# Patient Record
Sex: FEMALE | Race: WHITE | NOT HISPANIC OR LATINO | URBAN - METROPOLITAN AREA
[De-identification: names, ages, dates, MRNs, and addresses within clinical notes are randomized per-mention and may not be internally consistent; named-entity substitution may affect disease eponyms.]

---

## 2019-01-14 ENCOUNTER — INPATIENT (INPATIENT)
Facility: HOSPITAL | Age: 44
LOS: 0 days | Discharge: HOME | DRG: 897 | End: 2019-01-15
Attending: STUDENT IN AN ORGANIZED HEALTH CARE EDUCATION/TRAINING PROGRAM | Admitting: STUDENT IN AN ORGANIZED HEALTH CARE EDUCATION/TRAINING PROGRAM
Payer: COMMERCIAL

## 2019-01-14 VITALS
OXYGEN SATURATION: 98 % | WEIGHT: 218.04 LBS | RESPIRATION RATE: 18 BRPM | TEMPERATURE: 99 F | HEART RATE: 122 BPM | DIASTOLIC BLOOD PRESSURE: 83 MMHG | SYSTOLIC BLOOD PRESSURE: 128 MMHG

## 2019-01-14 LAB
ALBUMIN SERPL ELPH-MCNC: 5 G/DL — SIGNIFICANT CHANGE UP (ref 3.3–5)
ALP SERPL-CCNC: 57 U/L — SIGNIFICANT CHANGE UP (ref 40–120)
ALT FLD-CCNC: 123 U/L — HIGH (ref 10–45)
ANION GAP SERPL CALC-SCNC: 20 MMOL/L — HIGH (ref 5–17)
ANION GAP SERPL CALC-SCNC: 26 MMOL/L — HIGH (ref 5–17)
AST SERPL-CCNC: 146 U/L — HIGH (ref 10–40)
BASE EXCESS BLDV CALC-SCNC: 1.1 MMOL/L — SIGNIFICANT CHANGE UP
BASOPHILS NFR BLD AUTO: 0.6 % — SIGNIFICANT CHANGE UP (ref 0–2)
BILIRUB SERPL-MCNC: 0.7 MG/DL — SIGNIFICANT CHANGE UP (ref 0.2–1.2)
BUN SERPL-MCNC: 5 MG/DL — LOW (ref 7–23)
BUN SERPL-MCNC: 5 MG/DL — LOW (ref 7–23)
CALCIUM SERPL-MCNC: 8.7 MG/DL — SIGNIFICANT CHANGE UP (ref 8.4–10.5)
CALCIUM SERPL-MCNC: 9.6 MG/DL — SIGNIFICANT CHANGE UP (ref 8.4–10.5)
CHLORIDE SERPL-SCNC: 95 MMOL/L — LOW (ref 96–108)
CHLORIDE SERPL-SCNC: 96 MMOL/L — SIGNIFICANT CHANGE UP (ref 96–108)
CO2 SERPL-SCNC: 22 MMOL/L — SIGNIFICANT CHANGE UP (ref 22–31)
CO2 SERPL-SCNC: 22 MMOL/L — SIGNIFICANT CHANGE UP (ref 22–31)
CREAT SERPL-MCNC: 0.56 MG/DL — SIGNIFICANT CHANGE UP (ref 0.5–1.3)
CREAT SERPL-MCNC: 0.63 MG/DL — SIGNIFICANT CHANGE UP (ref 0.5–1.3)
EOSINOPHIL NFR BLD AUTO: 0.2 % — SIGNIFICANT CHANGE UP (ref 0–6)
GLUCOSE SERPL-MCNC: 131 MG/DL — HIGH (ref 70–99)
GLUCOSE SERPL-MCNC: 149 MG/DL — HIGH (ref 70–99)
HCO3 BLDV-SCNC: 25 MMOL/L — SIGNIFICANT CHANGE UP (ref 20–27)
HCT VFR BLD CALC: 44.8 % — SIGNIFICANT CHANGE UP (ref 34.5–45)
HGB BLD-MCNC: 15.6 G/DL — HIGH (ref 11.5–15.5)
LYMPHOCYTES # BLD AUTO: 34.1 % — SIGNIFICANT CHANGE UP (ref 13–44)
MCHC RBC-ENTMCNC: 33.3 PG — SIGNIFICANT CHANGE UP (ref 27–34)
MCHC RBC-ENTMCNC: 34.8 G/DL — SIGNIFICANT CHANGE UP (ref 32–36)
MCV RBC AUTO: 95.5 FL — SIGNIFICANT CHANGE UP (ref 80–100)
MONOCYTES NFR BLD AUTO: 19.8 % — HIGH (ref 2–14)
NEUTROPHILS NFR BLD AUTO: 45.3 % — SIGNIFICANT CHANGE UP (ref 43–77)
PCO2 BLDV: 36 MMHG — LOW (ref 41–51)
PH BLDV: 7.45 — HIGH (ref 7.32–7.43)
PLATELET # BLD AUTO: 167 K/UL — SIGNIFICANT CHANGE UP (ref 150–400)
PO2 BLDV: 78 MMHG — SIGNIFICANT CHANGE UP
POTASSIUM SERPL-MCNC: 3.6 MMOL/L — SIGNIFICANT CHANGE UP (ref 3.5–5.3)
POTASSIUM SERPL-MCNC: 4 MMOL/L — SIGNIFICANT CHANGE UP (ref 3.5–5.3)
POTASSIUM SERPL-SCNC: 3.6 MMOL/L — SIGNIFICANT CHANGE UP (ref 3.5–5.3)
POTASSIUM SERPL-SCNC: 4 MMOL/L — SIGNIFICANT CHANGE UP (ref 3.5–5.3)
PROT SERPL-MCNC: 8.1 G/DL — SIGNIFICANT CHANGE UP (ref 6–8.3)
RBC # BLD: 4.69 M/UL — SIGNIFICANT CHANGE UP (ref 3.8–5.2)
RBC # FLD: 12.8 % — SIGNIFICANT CHANGE UP (ref 10.3–16.9)
SAO2 % BLDV: 95 % — SIGNIFICANT CHANGE UP
SODIUM SERPL-SCNC: 138 MMOL/L — SIGNIFICANT CHANGE UP (ref 135–145)
SODIUM SERPL-SCNC: 143 MMOL/L — SIGNIFICANT CHANGE UP (ref 135–145)
WBC # BLD: 5.2 K/UL — SIGNIFICANT CHANGE UP (ref 3.8–10.5)
WBC # FLD AUTO: 5.2 K/UL — SIGNIFICANT CHANGE UP (ref 3.8–10.5)

## 2019-01-14 PROCEDURE — 99284 EMERGENCY DEPT VISIT MOD MDM: CPT

## 2019-01-14 RX ORDER — SODIUM CHLORIDE 9 MG/ML
1000 INJECTION, SOLUTION INTRAVENOUS
Qty: 0 | Refills: 0 | Status: DISCONTINUED | OUTPATIENT
Start: 2019-01-14 | End: 2019-01-15

## 2019-01-14 RX ORDER — SODIUM CHLORIDE 9 MG/ML
1000 INJECTION INTRAMUSCULAR; INTRAVENOUS; SUBCUTANEOUS ONCE
Qty: 0 | Refills: 0 | Status: COMPLETED | OUTPATIENT
Start: 2019-01-14 | End: 2019-01-14

## 2019-01-14 RX ADMIN — Medication 1 MILLIGRAM(S): at 14:07

## 2019-01-14 RX ADMIN — SODIUM CHLORIDE 2000 MILLILITER(S): 9 INJECTION INTRAMUSCULAR; INTRAVENOUS; SUBCUTANEOUS at 20:33

## 2019-01-14 RX ADMIN — SODIUM CHLORIDE 1000 MILLILITER(S): 9 INJECTION INTRAMUSCULAR; INTRAVENOUS; SUBCUTANEOUS at 12:25

## 2019-01-14 RX ADMIN — Medication 1 MILLIGRAM(S): at 11:40

## 2019-01-14 RX ADMIN — Medication 25 MILLIGRAM(S): at 11:38

## 2019-01-14 RX ADMIN — SODIUM CHLORIDE 1000 MILLILITER(S): 9 INJECTION INTRAMUSCULAR; INTRAVENOUS; SUBCUTANEOUS at 10:50

## 2019-01-14 RX ADMIN — Medication 25 MILLIGRAM(S): at 18:35

## 2019-01-14 RX ADMIN — SODIUM CHLORIDE 1000 MILLILITER(S): 9 INJECTION INTRAMUSCULAR; INTRAVENOUS; SUBCUTANEOUS at 13:25

## 2019-01-14 RX ADMIN — Medication 1 MILLIGRAM(S): at 20:33

## 2019-01-14 RX ADMIN — SODIUM CHLORIDE 1000 MILLILITER(S): 9 INJECTION INTRAMUSCULAR; INTRAVENOUS; SUBCUTANEOUS at 11:50

## 2019-01-14 NOTE — ED PROVIDER NOTE - MEDICAL DECISION MAKING DETAILS
Pt with anxiety , palpitations vitals with tachycardia, pe mild tremor no active nausea and vomiting, labs show gap most likely from alcoholic ketoacidosis, ph ok, pt received fluids, librium, ativan, still not feeling great - feeling weak and feels shaky and unstable to be discharged home.  Will give more fluids and ativan and repeat BMP and admit for further management. Pt with anxiety , palpitations vitals with tachycardia, pe mild tremor no active nausea and vomiting, labs show gap most likely from alcoholic ketoacidosis, ph ok, pt received fluids, librium, ativan, still not feeling great - feeling weak and feels shaky and unstable to be discharged home.  Will give more fluids and ativan and repeat BMP and admit for further management.  Pt admitted to medical floor - medicine requests stepdown consult.  Signed out to night team pending stepdown consult.

## 2019-01-14 NOTE — ED PROVIDER NOTE - CARE PLAN
Principal Discharge DX:	Palpitations  Secondary Diagnosis:	Alcohol abuse Principal Discharge DX:	Palpitations  Secondary Diagnosis:	Alcohol withdrawal

## 2019-01-14 NOTE — ED ADULT NURSE NOTE - INTERVENTIONS DEFINITIONS
Stretcher in lowest position, wheels locked, appropriate side rails in place/Instruct patient to call for assistance/Non-slip footwear when patient is off stretcher/Physically safe environment: no spills, clutter or unnecessary equipment/Provide visual cue, wrist band, yellow gown, etc.

## 2019-01-14 NOTE — ED ADULT TRIAGE NOTE - CHIEF COMPLAINT QUOTE
pt states "i've been taking amlodipine for a year for my BP, but for the past few weeks my hear is rasing mostly at night and feel very tired, I had the flu last week and my HR is not going down" denies cp, SOB, HA. pt states "i've been taking amlodipine for a year for my BP, but for the past few weeks my hear is rasing mostly at night and feel very tired, I had the flu last week and my HR is not going down" denies cp, SOB, HA. pt reports drinking alcohol every day, last drink was last night, RUM.

## 2019-01-14 NOTE — ED ADULT NURSE NOTE - OBJECTIVE STATEMENT
Patient is a 42yo female reporting palpitations x1 week. Patient reports she had diarrhea and vomiting one week ago followed by palpitations. Patient reports drinking 0.5 L of rum daily x8 years. Never been in withdrawal. Last drink 0500. Denies chest pain, shortness of breath, abdominal pain, n/v/d, fevers. Patient tremulous and anxious.

## 2019-01-14 NOTE — ED PROVIDER NOTE - OBJECTIVE STATEMENT
44 y/o f with hx of etoh abuse x 8 years.  Pt drinks half a bottle of gin per day.  Presents this morning with anxiety and palpitations and not feeling well.  Pt states last drink was at 5am.  Denies nausea, vomiting, hallucinations, chest pain, shortness of breath.  No hx of seizures.  Has never been in inpatient detox setting.  No drug use or other significant medical history.

## 2019-01-14 NOTE — ED ADULT NURSE NOTE - CHIEF COMPLAINT QUOTE
pt states "i've been taking amlodipine for a year for my BP, but for the past few weeks my hear is rasing mostly at night and feel very tired, I had the flu last week and my HR is not going down" denies cp, SOB, HA. pt reports drinking alcohol every day, last drink was last night, RUM.

## 2019-01-14 NOTE — CONSULT NOTE ADULT - ASSESSMENT
Dispo: [facility]  Discussed with Dr. Mortensen - recommend urine drug screen for other etiologies of tachycardia  - recommend UA to evaluate for ketones for starvation ketosis  - add on Mg and Phos    Dispo: [facility]  Discussed with Dr. Mortensen 43F PMH EtOH use disorder x 8 years (no DTs, no intubations; 1/3-1/2 bottle of rum per day) presenting with palpitations x 4 days, admitted for alcohol withdrawal.    CNS  #Alcohol dependency with alcohol intoxication.  > serum alcohol level 313 on admission  > Last drink at 5am on 1/14  - continue to monitor CIWA  - Librium 25 q8h  - IVP Ativan PRN for CIWA > 8  - recommend counseling on alcohol cessation  - monitor and replete K, Mg, Phos as needed    CV  #tachycardia  Ddx include EtOH vs. compounded by drugs  > s/p 3L NS  - recommend urine drug screen for other etiologies of tachycardia  - EtOH withdrawal management as above  - recommend UA to evaluate for ketones for starvation ketosis    GI  #elevated transminases  - may be due to chronic alcohol use although not in typical 2:1 pattern  - continue to monitor CMP  - can consider RUQ ultrasound if does not resolve  - keep NPO until passes dysphagia screen  - ppx in setting of recent vomiting with H2 blocker      Dispo: [facility]  Discussed with Dr. Mortensen 43F PMH EtOH use disorder x 8 years (no DTs, no intubations; 1/3-1/2 bottle of rum per day) presenting with palpitations x 4 days, admitted for alcohol withdrawal.    CNS  #Alcohol dependency with alcohol intoxication.  > serum alcohol level 313 on admission  > Last drink at 5am on 1/14  - continue to monitor CIWA   - Librium 25 q8h  - IVP Ativan PRN for CIWA > 8  - c/w thiamine/MVI/folate  - recommend counseling on alcohol cessation  - monitor and replete K, Mg, Phos as needed    CV  #tachycardia  Ddx include EtOH vs. compounded by drugs  > s/p 3L NS  - recommend urine drug screen for other etiologies of tachycardia  - EtOH withdrawal management as above  - recommend UA to evaluate for ketones for starvation ketosis    GI  #elevated transminases  - may be due to chronic alcohol use although not in typical 2:1 pattern  - continue to monitor CMP  - can consider RUQ ultrasound if does not resolve  - keep NPO until passes dysphagia screen  - ppx in setting of recent vomiting with H2 blocker      Dispo: [facility]  Discussed with Dr. Mortensen

## 2019-01-14 NOTE — CONSULT NOTE ADULT - SUBJECTIVE AND OBJECTIVE BOX
ICU CONSULT NOTE    CHIEF COMPLAINT: palpitations    HPI:  43F PMH EtOH use disorder x 8 years (no DTs, no intubations; 1/3-1/2 bottle of rum per day)     PAST MEDICAL & SURGICAL HISTORY:  Hypertension  Alcohol abuse      REVIEW OF SYSTEMS: negative except as stated in HPI.    MEDICATIONS  (STANDING):  multivitamin/thiamine/folic acid in sodium chloride 0.9% 1000 milliLiter(s) (125 mL/Hr) IV Continuous <Continuous>    MEDICATIONS  (PRN):      Allergies    predniSONE (Rash)  Tamiflu (Other)    Intolerances        FAMILY HISTORY:      SOCIAL HISTORY:     Vital Signs Last 24 Hrs  T(C): 37.3 (14 Jan 2019 19:12), Max: 37.3 (14 Jan 2019 19:12)  T(F): 99.2 (14 Jan 2019 19:12), Max: 99.2 (14 Jan 2019 19:12)  HR: 112 (14 Jan 2019 19:12) (110 - 122)  BP: 125/74 (14 Jan 2019 19:12) (102/63 - 128/83)  BP(mean): --  RR: 18 (14 Jan 2019 19:12) (18 - 18)  SpO2: 96% (14 Jan 2019 19:12) (96% - 98%)    PHYSICAL EXAM:  GEN: alert, NAD, comfortable in bed  HEENT: PERRL, no relative afferent pupillary defect, EOMI, moist MM, no pharyngeal erythema or exudate  CV: RRR, S1/S2, no murmurs appreciated, no JVD, no carotid bruits  RESP: CTA bilaterally, good respiratory effort, good air movement, no wheezes/rales  ABD: soft, BS+, nontender, nondistended, no guarding/rebound  EXTREMITIES: WWP, pulses 2+ in all 4 extremities, no peripheral edema  MSK: full range of motion of all 4 extremities  SKIN: warm, dry, intact, no rashes  NEURO: A&Ox3, no focal deficits, strength 5/5 throughout, no sensory deficits  PSYC: normal mood/affect    LABS: reviewed.    CAPILLARY BLOOD GLUCOSE      POCT Blood Glucose.: 99 mg/dL (14 Jan 2019 11:12)                          15.6   5.2   )-----------( 167      ( 14 Jan 2019 10:46 )             44.8     01-14    138  |  96  |  5<L>  ----------------------------<  131<H>  4.0   |  22  |  0.56    Ca    8.7      14 Jan 2019 19:55    TPro  8.1  /  Alb  5.0  /  TBili  0.7  /  DBili  x   /  AST  146<H>  /  ALT  123<H>  /  AlkPhos  57  01-14      LIVER FUNCTIONS - ( 14 Jan 2019 10:46 )  Alb: 5.0 g/dL / Pro: 8.1 g/dL / ALK PHOS: 57 U/L / ALT: 123 U/L / AST: 146 U/L / GGT: x                       RADIOLOGY AND ADDITIONAL TESTS: reviewed.    Chest XR:  EKG:  Echocardiogram: ICU CONSULT NOTE    CHIEF COMPLAINT: palpitations    HPI:  43F PMH EtOH use disorder x 8 years (no DTs, no intubations; 1/3-1/2 bottle of rum per day) presenting with palpitations x 4 days. Pt states that she was feeling ill, having diarrhea and vomiting     PAST MEDICAL & SURGICAL HISTORY:  Hypertension  Alcohol abuse      REVIEW OF SYSTEMS: negative except as stated in HPI.    MEDICATIONS  (STANDING):  multivitamin/thiamine/folic acid in sodium chloride 0.9% 1000 milliLiter(s) (125 mL/Hr) IV Continuous <Continuous>    MEDICATIONS  (PRN):      Allergies    predniSONE (Rash)  Tamiflu (Other)    Intolerances        FAMILY HISTORY:      SOCIAL HISTORY:     Vital Signs Last 24 Hrs  T(C): 37.3 (14 Jan 2019 19:12), Max: 37.3 (14 Jan 2019 19:12)  T(F): 99.2 (14 Jan 2019 19:12), Max: 99.2 (14 Jan 2019 19:12)  HR: 112 (14 Jan 2019 19:12) (110 - 122)  BP: 125/74 (14 Jan 2019 19:12) (102/63 - 128/83)  BP(mean): --  RR: 18 (14 Jan 2019 19:12) (18 - 18)  SpO2: 96% (14 Jan 2019 19:12) (96% - 98%)    PHYSICAL EXAM:  GEN: alert, NAD, comfortable in bed  HEENT: PERRL, no relative afferent pupillary defect, EOMI, moist MM, no pharyngeal erythema or exudate  CV: RRR, S1/S2, no murmurs appreciated, no JVD, no carotid bruits  RESP: CTA bilaterally, good respiratory effort, good air movement, no wheezes/rales  ABD: soft, BS+, nontender, nondistended, no guarding/rebound  EXTREMITIES: WWP, pulses 2+ in all 4 extremities, no peripheral edema  MSK: full range of motion of all 4 extremities  SKIN: warm, dry, intact, no rashes  NEURO: A&Ox3, no focal deficits, strength 5/5 throughout, no sensory deficits  PSYC: normal mood/affect    LABS: reviewed.    CAPILLARY BLOOD GLUCOSE      POCT Blood Glucose.: 99 mg/dL (14 Jan 2019 11:12)                          15.6   5.2   )-----------( 167      ( 14 Jan 2019 10:46 )             44.8     01-14    138  |  96  |  5<L>  ----------------------------<  131<H>  4.0   |  22  |  0.56    Ca    8.7      14 Jan 2019 19:55    TPro  8.1  /  Alb  5.0  /  TBili  0.7  /  DBili  x   /  AST  146<H>  /  ALT  123<H>  /  AlkPhos  57  01-14      LIVER FUNCTIONS - ( 14 Jan 2019 10:46 )  Alb: 5.0 g/dL / Pro: 8.1 g/dL / ALK PHOS: 57 U/L / ALT: 123 U/L / AST: 146 U/L / GGT: x                       RADIOLOGY AND ADDITIONAL TESTS: reviewed.    Chest XR:  EKG:  Echocardiogram: ICU CONSULT NOTE    CHIEF COMPLAINT: palpitations    HPI:  43F PMH EtOH use disorder x 8 years (no DTs, no intubations; 1/3-1/2 bottle of rum per day) presenting with palpitations x 4 days. Pt states that she was feeling ill, having diarrhea and nonbloody vomiting since Friday 1/4 which is typical of her "flu like symptoms" and thus eventually took a Tamiflu that she had at home on Wednesday 1/9, upon which pt started experiencing palpitations. Throughout this time, pt has been having poor PO intake. The only hospitalization she has had for alcohol was during her college years when she had a gastric lavage for her alcohol use. Otherwise, has no prior history of DT, seizures, or withdrawals.   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 States that she does feel alcohol is somewhat of a problem as she now takes sips of alcohol prior to running errands. Has never attended rehab. Lives alone.    Denies f/c, cp, abdominal pain, current n/v/d/c, dysuria or rash.  ED Vitals afebrile, tachycardic to 120s then came down to 110s.  ED Adm: Ativan 1mg IVP x3, Librium 25 PO x2 and 3L NS bolus.      PAST MEDICAL & SURGICAL HISTORY:  Hypertension  Alcohol abuse      REVIEW OF SYSTEMS: negative except as stated in HPI.    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 Denies any tobacco use or other drug use. Endorses alcohol use.    Vital Signs Last 24 Hrs  T(C): 37.3 (14 Jan 2019 19:12), Max: 37.3 (14 Jan 2019 19:12)  T(F): 99.2 (14 Jan 2019 19:12), Max: 99.2 (14 Jan 2019 19:12)  HR: 112 (14 Jan 2019 19:12) (110 - 122)  BP: 125/74 (14 Jan 2019 19:12) (102/63 - 128/83)  BP(mean): --  RR: 18 (14 Jan 2019 19:12) (18 - 18)  SpO2: 96% (14 Jan 2019 19:12) (96% - 98%)    PHYSICAL EXAM:  GEN: alert, NAD, comfortable in bed; no diaphoresis  HEENT: PERRL, no relative afferent pupillary defect, EOMI, moist MM, no pharyngeal erythema or exudate  CV: tachycardic, regular, S1/S2, no murmurs appreciated, no JVD, no carotid bruits  RESP: CTA bilaterally, good respiratory effort, good air movement, no wheezes/rales  ABD: soft, BS+, nontender, nondistended, no guarding/rebound  EXTREMITIES: WWP, pulses 2+ in all 4 extremities, no peripheral edema  MSK: full range of motion of all 4 extremities  SKIN: warm, dry, intact, no rashes  NEURO: A&Ox3, no focal deficits, strength 5/5 throughout, no sensory deficits  PSYC: normal mood/affect    LABS: reviewed.    CAPILLARY BLOOD GLUCOSE      POCT Blood Glucose.: 99 mg/dL (14 Jan 2019 11:12)                          15.6   5.2   )-----------( 167      ( 14 Jan 2019 10:46 )             44.8     01-14    138  |  96  |  5<L>  ----------------------------<  131<H>  4.0   |  22  |  0.56    Ca    8.7      14 Jan 2019 19:55    TPro  8.1  /  Alb  5.0  /  TBili  0.7  /  DBili  x   /  AST  146<H>  /  ALT  123<H>  /  AlkPhos  57  01-14      LIVER FUNCTIONS - ( 14 Jan 2019 10:46 )  Alb: 5.0 g/dL / Pro: 8.1 g/dL / ALK PHOS: 57 U/L / ALT: 123 U/L / AST: 146 U/L / GGT: x                       RADIOLOGY AND ADDITIONAL TESTS: reviewed.    Chest XR:   EKG: sinus tachycardia

## 2019-01-14 NOTE — ED PROVIDER NOTE - NSFOLLOWUPINSTRUCTIONS_ED_ALL_ED_FT
Stay hydrated and eat well balanced meals.     Avoid excessive alcohol.    Follow up with your PMD.    Return to ED with worsening symptoms or other concerns. Stay hydrated and eat well balanced meals.     Avoid excessive alcohol.    Follow up with your PMD.    Return to ED with worsening symptoms or other concerns.    Alcohol Use Disorder  Alcohol use disorder is when your drinking disrupts your daily life. When you have this condition, you drink too much alcohol and you cannot control your drinking.    Alcohol use disorder can cause serious problems with your physical health. It can affect your brain, heart, liver, pancreas, immune system, stomach, and intestines. Alcohol use disorder can increase your risk for certain cancers and cause problems with your mental health, such as depression, anxiety, psychosis, delirium, and dementia. People with this disorder risk hurting themselves and others.    What are the causes?  This condition is caused by drinking too much alcohol over time. It is not caused by drinking too much alcohol only one or two times. Some people with this condition drink alcohol to cope with or escape from negative life events. Others drink to relieve pain or symptoms of mental illness.    What increases the risk?  You are more likely to develop this condition if:    You have a family history of alcohol use disorder.  Your culture encourages drinking to the point of intoxication, or makes alcohol easy to get.  You had a mood or conduct disorder in childhood.  You have been a victim of abuse.  You are an adolescent and:    You have poor grades or difficulties in school.  Your caregivers do not talk to you about saying no to alcohol, or supervise your activities.  You are impulsive or you have trouble with self-control.      What are the signs or symptoms?  Symptoms of this condition include:    Drinking more than you want to.  Drinking for longer than you want to.  Trying several times to drink less or to control your drinking.  Spending a lot of time getting alcohol, drinking, or recovering from drinking.  Craving alcohol.  Having problems at work, at school, or at home due to drinking.  Having problems in relationships due to drinking.  Drinking when it is dangerous to drink, such as before driving a car.  Continuing to drink even though you know you might have a physical or mental problem related to drinking.  Needing more and more alcohol to get the same effect you want from the alcohol (building up tolerance).  Having symptoms of withdrawal when you stop drinking. Symptoms of withdrawal include:    Fatigue.  Nightmares.  Trouble sleeping.  Depression.  Anxiety.  Fever.  Seizures.  Severe confusion.  Feeling or seeing things that are not there (hallucinations).  Tremors.  Rapid heart rate.  Rapid breathing.  High blood pressure.    Drinking to avoid symptoms of withdrawal.    How is this diagnosed?  This condition is diagnosed with an assessment. Your health care provider may start the assessment by asking three or four questions about your drinking.    Your health care provider may perform a physical exam or do lab tests to see if you have physical problems resulting from alcohol use. She or he may refer you to a mental health professional for evaluation.    How is this treated?  Some people with alcohol use disorder are able to reduce their alcohol use to low-risk levels. Others need to completely quit drinking alcohol. When necessary, mental health professionals with specialized training in substance use treatment can help. Your health care provider can help you decide how severe your alcohol use disorder is and what type of treatment you need. The following forms of treatment are available:    Detoxification. Detoxification involves quitting drinking and using prescription medicines within the first week to help lessen withdrawal symptoms. This treatment is important for people who have had withdrawal symptoms before and for heavy drinkers who are likely to have withdrawal symptoms. Alcohol withdrawal can be dangerous, and in severe cases, it can cause death. Detoxification may be provided in a home, community, or primary care setting, or in a hospital or substance use treatment facility.  Counseling. This treatment is also called talk therapy. It is provided by substance use treatment counselors. A counselor can address the reasons you use alcohol and suggest ways to keep you from drinking again or to prevent problem drinking. The goals of talk therapy are to:    Find healthy activities and ways for you to cope with stress.  Identify and avoid the things that trigger your alcohol use.  Help you learn how to handle cravings.    Medicines. Medicines can help treat alcohol use disorder by:    Decreasing alcohol cravings.  Decreasing the positive feeling you have when you drink alcohol.  Causing an uncomfortable physical reaction when you drink alcohol (aversion therapy).    Support groups. Support groups are led by people who have quit drinking. They provide emotional support, advice, and guidance.    ImageThese forms of treatment are often combined. Some people with this condition benefit from a combination of treatments provided by specialized substance use treatment centers.    Follow these instructions at home:  Take over-the-counter and prescription medicines only as told by your health care provider.  Check with your health care provider before starting any new medicines.  Ask friends and family members not to offer you alcohol.  Avoid situations where alcohol is served, including gatherings where others are drinking alcohol.  Create a plan for what to do when you are tempted to use alcohol.  Find hobbies or activities that you enjoy that do not include alcohol.  Keep all follow-up visits as told by your health care provider. This is important.  How is this prevented?  If you drink, limit alcohol intake to no more than 1 drink a day for nonpregnant women and 2 drinks a day for men. One drink equals 12 oz of beer, 5 oz of wine, or 1½ oz of hard liquor.  If you have a mental health condition, get treatment and support.  Do not give alcohol to adolescents.  If you are an adolescent:    Do not drink alcohol.  Do not be afraid to say no if someone offers you alcohol. Speak up about why you do not want to drink. You can be a positive role model for your friends and set a good example for those around you by not drinking alcohol.  If your friends drink, spend time with others who do not drink alcohol. Make new friends who do not use alcohol.  Find healthy ways to manage stress and emotions, such as meditation or deep breathing, exercise, spending time in nature, listening to music, or talking with a trusted friend or family member.    Contact a health care provider if:  You are not able to take your medicines as told.  Your symptoms get worse.  You return to drinking alcohol (relapse) and your symptoms get worse.  Get help right away if:  You have thoughts about hurting yourself or others.  If you ever feel like you may hurt yourself or others, or have thoughts about taking your own life, get help right away. You can go to your nearest emergency department or call:     Your local emergency services (911 in the [place]   A suicide crisis helpline, such as the National Suicide Prevention Lifeline at 1-249.918.4423. This is open 24 hours a day.     Summary  Alcohol use disorder is when your drinking disrupts your daily life. When you have this condition, you drink too much alcohol and you cannot control your drinking.  Treatment may include detoxification, counseling, medicine, and support groups.  Ask friends and family members not to offer you alcohol. Avoid situations where alcohol is served.  Get help right away if you have thoughts about hurting yourself or others.  This information is not intended to replace advice given to you by your health care provider. Make sure you discuss any questions you have with your health care provider.

## 2019-01-14 NOTE — ED PROVIDER NOTE - PROGRESS NOTE DETAILS
mahi: pt received at sign out from dr hector as etoh withdrawal pending micu consult for SD setting, seen and deemed stable for reg floor

## 2019-01-15 ENCOUNTER — TRANSCRIPTION ENCOUNTER (OUTPATIENT)
Age: 44
End: 2019-01-15

## 2019-01-15 VITALS
RESPIRATION RATE: 19 BRPM | HEART RATE: 100 BPM | TEMPERATURE: 98 F | OXYGEN SATURATION: 97 % | SYSTOLIC BLOOD PRESSURE: 120 MMHG | DIASTOLIC BLOOD PRESSURE: 80 MMHG

## 2019-01-15 DIAGNOSIS — E87.4 MIXED DISORDER OF ACID-BASE BALANCE: ICD-10-CM

## 2019-01-15 DIAGNOSIS — Z29.9 ENCOUNTER FOR PROPHYLACTIC MEASURES, UNSPECIFIED: ICD-10-CM

## 2019-01-15 DIAGNOSIS — F10.10 ALCOHOL ABUSE, UNCOMPLICATED: ICD-10-CM

## 2019-01-15 DIAGNOSIS — Z98.890 OTHER SPECIFIED POSTPROCEDURAL STATES: Chronic | ICD-10-CM

## 2019-01-15 DIAGNOSIS — F10.239 ALCOHOL DEPENDENCE WITH WITHDRAWAL, UNSPECIFIED: ICD-10-CM

## 2019-01-15 DIAGNOSIS — I10 ESSENTIAL (PRIMARY) HYPERTENSION: ICD-10-CM

## 2019-01-15 DIAGNOSIS — E87.2 ACIDOSIS: ICD-10-CM

## 2019-01-15 DIAGNOSIS — R00.2 PALPITATIONS: ICD-10-CM

## 2019-01-15 DIAGNOSIS — Z91.89 OTHER SPECIFIED PERSONAL RISK FACTORS, NOT ELSEWHERE CLASSIFIED: ICD-10-CM

## 2019-01-15 DIAGNOSIS — R65.10 SYSTEMIC INFLAMMATORY RESPONSE SYNDROME (SIRS) OF NON-INFECTIOUS ORIGIN WITHOUT ACUTE ORGAN DYSFUNCTION: ICD-10-CM

## 2019-01-15 DIAGNOSIS — R74.0 NONSPECIFIC ELEVATION OF LEVELS OF TRANSAMINASE AND LACTIC ACID DEHYDROGENASE [LDH]: ICD-10-CM

## 2019-01-15 LAB
ALBUMIN SERPL ELPH-MCNC: 4.3 G/DL — SIGNIFICANT CHANGE UP (ref 3.3–5)
ALP SERPL-CCNC: 44 U/L — SIGNIFICANT CHANGE UP (ref 40–120)
ALT FLD-CCNC: 89 U/L — HIGH (ref 10–45)
ANION GAP SERPL CALC-SCNC: 17 MMOL/L — SIGNIFICANT CHANGE UP (ref 5–17)
APPEARANCE UR: CLEAR — SIGNIFICANT CHANGE UP
AST SERPL-CCNC: 99 U/L — HIGH (ref 10–40)
BILIRUB SERPL-MCNC: 1 MG/DL — SIGNIFICANT CHANGE UP (ref 0.2–1.2)
BILIRUB UR-MCNC: NEGATIVE — SIGNIFICANT CHANGE UP
BUN SERPL-MCNC: 7 MG/DL — SIGNIFICANT CHANGE UP (ref 7–23)
CALCIUM SERPL-MCNC: 8.9 MG/DL — SIGNIFICANT CHANGE UP (ref 8.4–10.5)
CHLORIDE SERPL-SCNC: 97 MMOL/L — SIGNIFICANT CHANGE UP (ref 96–108)
CO2 SERPL-SCNC: 24 MMOL/L — SIGNIFICANT CHANGE UP (ref 22–31)
COLOR SPEC: YELLOW — SIGNIFICANT CHANGE UP
CREAT SERPL-MCNC: 0.56 MG/DL — SIGNIFICANT CHANGE UP (ref 0.5–1.3)
DIFF PNL FLD: NEGATIVE — SIGNIFICANT CHANGE UP
GLUCOSE SERPL-MCNC: 92 MG/DL — SIGNIFICANT CHANGE UP (ref 70–99)
GLUCOSE UR QL: NEGATIVE — SIGNIFICANT CHANGE UP
HCG SERPL-ACNC: <.1 MIU/ML — SIGNIFICANT CHANGE UP
HCT VFR BLD CALC: 40.5 % — SIGNIFICANT CHANGE UP (ref 34.5–45)
HGB BLD-MCNC: 13.5 G/DL — SIGNIFICANT CHANGE UP (ref 11.5–15.5)
KETONES UR-MCNC: NEGATIVE — SIGNIFICANT CHANGE UP
LEUKOCYTE ESTERASE UR-ACNC: NEGATIVE — SIGNIFICANT CHANGE UP
MAGNESIUM SERPL-MCNC: 1.6 MG/DL — SIGNIFICANT CHANGE UP (ref 1.6–2.6)
MCHC RBC-ENTMCNC: 33.3 G/DL — SIGNIFICANT CHANGE UP (ref 32–36)
MCHC RBC-ENTMCNC: 33.3 PG — SIGNIFICANT CHANGE UP (ref 27–34)
MCV RBC AUTO: 99.8 FL — SIGNIFICANT CHANGE UP (ref 80–100)
NITRITE UR-MCNC: NEGATIVE — SIGNIFICANT CHANGE UP
PCP SPEC-MCNC: SIGNIFICANT CHANGE UP
PCP SPEC-MCNC: SIGNIFICANT CHANGE UP
PH UR: 7.5 — SIGNIFICANT CHANGE UP (ref 5–8)
PLATELET # BLD AUTO: 147 K/UL — LOW (ref 150–400)
POTASSIUM SERPL-MCNC: 3.8 MMOL/L — SIGNIFICANT CHANGE UP (ref 3.5–5.3)
POTASSIUM SERPL-SCNC: 3.8 MMOL/L — SIGNIFICANT CHANGE UP (ref 3.5–5.3)
PROT SERPL-MCNC: 6.7 G/DL — SIGNIFICANT CHANGE UP (ref 6–8.3)
PROT UR-MCNC: NEGATIVE MG/DL — SIGNIFICANT CHANGE UP
RBC # BLD: 4.06 M/UL — SIGNIFICANT CHANGE UP (ref 3.8–5.2)
RBC # FLD: 12.5 % — SIGNIFICANT CHANGE UP (ref 10.3–16.9)
SODIUM SERPL-SCNC: 138 MMOL/L — SIGNIFICANT CHANGE UP (ref 135–145)
SP GR SPEC: 1.01 — SIGNIFICANT CHANGE UP (ref 1–1.03)
TSH SERPL-MCNC: 1.47 UIU/ML — SIGNIFICANT CHANGE UP (ref 0.35–4.94)
UROBILINOGEN FLD QL: 0.2 E.U./DL — SIGNIFICANT CHANGE UP
WBC # BLD: 5.3 K/UL — SIGNIFICANT CHANGE UP (ref 3.8–10.5)
WBC # FLD AUTO: 5.3 K/UL — SIGNIFICANT CHANGE UP (ref 3.8–10.5)

## 2019-01-15 PROCEDURE — 99285 EMERGENCY DEPT VISIT HI MDM: CPT | Mod: 25

## 2019-01-15 PROCEDURE — 84443 ASSAY THYROID STIM HORMONE: CPT

## 2019-01-15 PROCEDURE — 96376 TX/PRO/DX INJ SAME DRUG ADON: CPT

## 2019-01-15 PROCEDURE — 80053 COMPREHEN METABOLIC PANEL: CPT

## 2019-01-15 PROCEDURE — 85025 COMPLETE CBC W/AUTO DIFF WBC: CPT

## 2019-01-15 PROCEDURE — 80307 DRUG TEST PRSMV CHEM ANLYZR: CPT

## 2019-01-15 PROCEDURE — 82803 BLOOD GASES ANY COMBINATION: CPT

## 2019-01-15 PROCEDURE — 96361 HYDRATE IV INFUSION ADD-ON: CPT

## 2019-01-15 PROCEDURE — 12345: CPT | Mod: NC,GC

## 2019-01-15 PROCEDURE — 83735 ASSAY OF MAGNESIUM: CPT

## 2019-01-15 PROCEDURE — 99223 1ST HOSP IP/OBS HIGH 75: CPT | Mod: GC

## 2019-01-15 PROCEDURE — 81003 URINALYSIS AUTO W/O SCOPE: CPT

## 2019-01-15 PROCEDURE — 96374 THER/PROPH/DIAG INJ IV PUSH: CPT

## 2019-01-15 PROCEDURE — 85027 COMPLETE CBC AUTOMATED: CPT

## 2019-01-15 PROCEDURE — 36415 COLL VENOUS BLD VENIPUNCTURE: CPT

## 2019-01-15 PROCEDURE — 80048 BASIC METABOLIC PNL TOTAL CA: CPT

## 2019-01-15 PROCEDURE — 82962 GLUCOSE BLOOD TEST: CPT

## 2019-01-15 PROCEDURE — 84702 CHORIONIC GONADOTROPIN TEST: CPT

## 2019-01-15 RX ORDER — THIAMINE MONONITRATE (VIT B1) 100 MG
1 TABLET ORAL
Qty: 30 | Refills: 0
Start: 2019-01-15 | End: 2019-02-13

## 2019-01-15 RX ORDER — THIAMINE MONONITRATE (VIT B1) 100 MG
100 TABLET ORAL DAILY
Qty: 0 | Refills: 0 | Status: DISCONTINUED | OUTPATIENT
Start: 2019-01-15 | End: 2019-01-15

## 2019-01-15 RX ORDER — AMLODIPINE BESYLATE 2.5 MG/1
2.5 TABLET ORAL DAILY
Qty: 0 | Refills: 0 | Status: DISCONTINUED | OUTPATIENT
Start: 2019-01-15 | End: 2019-01-15

## 2019-01-15 RX ORDER — FOLIC ACID 0.8 MG
1 TABLET ORAL DAILY
Qty: 0 | Refills: 0 | Status: DISCONTINUED | OUTPATIENT
Start: 2019-01-15 | End: 2019-01-15

## 2019-01-15 RX ORDER — AMLODIPINE BESYLATE 2.5 MG/1
0 TABLET ORAL
Qty: 0 | Refills: 0 | COMMUNITY

## 2019-01-15 RX ORDER — POTASSIUM CHLORIDE 20 MEQ
20 PACKET (EA) ORAL ONCE
Qty: 0 | Refills: 0 | Status: COMPLETED | OUTPATIENT
Start: 2019-01-15 | End: 2019-01-15

## 2019-01-15 RX ORDER — MAGNESIUM SULFATE 500 MG/ML
2 VIAL (ML) INJECTION ONCE
Qty: 0 | Refills: 0 | Status: COMPLETED | OUTPATIENT
Start: 2019-01-15 | End: 2019-01-15

## 2019-01-15 RX ORDER — FOLIC ACID 0.8 MG
1 TABLET ORAL
Qty: 30 | Refills: 0
Start: 2019-01-15 | End: 2019-02-13

## 2019-01-15 RX ORDER — INFLUENZA VIRUS VACCINE 15; 15; 15; 15 UG/.5ML; UG/.5ML; UG/.5ML; UG/.5ML
0.5 SUSPENSION INTRAMUSCULAR ONCE
Qty: 0 | Refills: 0 | Status: DISCONTINUED | OUTPATIENT
Start: 2019-01-15 | End: 2019-01-15

## 2019-01-15 RX ORDER — HEPARIN SODIUM 5000 [USP'U]/ML
5000 INJECTION INTRAVENOUS; SUBCUTANEOUS EVERY 8 HOURS
Qty: 0 | Refills: 0 | Status: DISCONTINUED | OUTPATIENT
Start: 2019-01-15 | End: 2019-01-15

## 2019-01-15 RX ADMIN — Medication 50 MILLIGRAM(S): at 14:39

## 2019-01-15 RX ADMIN — Medication 1 MILLIGRAM(S): at 11:11

## 2019-01-15 RX ADMIN — SODIUM CHLORIDE 125 MILLILITER(S): 9 INJECTION, SOLUTION INTRAVENOUS at 02:00

## 2019-01-15 RX ADMIN — HEPARIN SODIUM 5000 UNIT(S): 5000 INJECTION INTRAVENOUS; SUBCUTANEOUS at 14:39

## 2019-01-15 RX ADMIN — Medication 50 GRAM(S): at 08:12

## 2019-01-15 RX ADMIN — Medication 25 MILLIGRAM(S): at 02:01

## 2019-01-15 RX ADMIN — AMLODIPINE BESYLATE 2.5 MILLIGRAM(S): 2.5 TABLET ORAL at 05:47

## 2019-01-15 RX ADMIN — HEPARIN SODIUM 5000 UNIT(S): 5000 INJECTION INTRAVENOUS; SUBCUTANEOUS at 05:47

## 2019-01-15 RX ADMIN — Medication 1 MILLIGRAM(S): at 00:32

## 2019-01-15 RX ADMIN — Medication 20 MILLIEQUIVALENT(S): at 08:12

## 2019-01-15 RX ADMIN — Medication 1 TABLET(S): at 11:11

## 2019-01-15 RX ADMIN — Medication 100 MILLIGRAM(S): at 11:11

## 2019-01-15 RX ADMIN — Medication 2 MILLIGRAM(S): at 10:48

## 2019-01-15 NOTE — H&P ADULT - ATTENDING COMMENTS
patient seen and examined; reports having n/v/d last week on monday, thought viral , decreased intake of ETOH as a result; took previously rxd tamiflu (for episode of bronchiits) on Wednesday (first time taking medication), causing palpitations since then. Denies previous hx of ETOH withdrawl ; denies n/v currently, denies hallucinations     reviewed data including:  labs, available radiological reports/ studies, ekg,     agree w/ PE findings as above w/ additions/ exceptions/ pertinent findings: asleep, awoke, NAD, flat affect, mildly anxious ; perrla, no photophobia b/l; mild tongue fasciculations, s1s2, lungs cta b/l, abdomen nt/nd; mild hand tremors ; no lower ext edema/tenderness b/l     1. ETOH withdrawl : monitor CIWA (4 at time of exam); on librium standing, ativan prn; c/w IV banana bag, transition to PO folic acid / mv/ thiamine. encourage ETOH cessation     rest of plan as above

## 2019-01-15 NOTE — PROVIDER CONTACT NOTE (OTHER) - ACTION/TREATMENT ORDERED:
Dr. Grant made aware - this is second time test was cancelled. Dr. Grant stated test is no longer needed.

## 2019-01-15 NOTE — DISCHARGE NOTE ADULT - PLAN OF CARE
To treat your symptoms You came in with symptoms related to alcohol withdrawal. You were treated with medications that help your withdrawal symptoms. You will take these medications for two more days. Please continue to take your blood pressure medication and follow up with your primary care doctor or cardiologist to have you blood pressure monitored. You came in with symptoms related to alcohol withdrawal. You were treated with medications that help your withdrawal symptoms including Librium and Ativan. You will take these medications for two more days as noted on this document Please continue to take your blood pressure medication and follow up with your primary care doctor or cardiologist to have you blood pressure monitored. Your blood pressure has been controlled since you have been hospitalized.

## 2019-01-15 NOTE — H&P ADULT - HISTORY OF PRESENT ILLNESS
42 y/o F PMHX EtOH, HTN presenting to [facility] ED wtih complaints of anxiety and palpitations since early AM 1/14.     ED VS: T 98.9-99.2F; -122; -134/63-83; RR 18-22; Sp02 96-98 RA  ED Course: s/p 3L NS, banana bag x1, Ativan 1 mg IVP x3, Librium 25 mg PO x2; labs with no leukocytosis, notable for an AG 26-->20 (s/p fluids), EtOH 313; VBG c/w respiratory alkalosis (pH 7.45, pC03 36) 44 y/o F PMHX EtOH, HTN presenting to [facility] ED [facility] complaints of anxiety and palpitations since early AM 1/14. Patient reports seeing her Cardiologist, Dr Martinez, this morning around 9 am for reported palpitations that she has been experiencing since last Wednesday, 1/9. Patient notes starting Monday, 1/7 she began reporting flu-like symptoms with reported diarrhea/vomiting (approx 5 episodes, since resolved). Patient was still feeling ill on Wednesday and decided to take 1 Tamiflu, after which she reports experiencing palpitations since. Since then, patient has noted poor PO intake as well. Patient was advised to proceed to the ED for further evaluation from her cardiologist's office. Patient also notes a long-standing history of drinking, where she reports drinking 1/2-1/3 L rum per day for the past 8 years. Patient reports that her job is high-stress which reports was the inciting factor to her drinking. Denies any history of DTs, withdrawal seizures, or admissions for EtOH withdrawal. Last drink: 5 am on 1/14.    ROS today notable for mild diaphoresis, tremors and feeling unsteady on her feet. Otherwise, patient denies any fevers, chills, NS, chest pain, shortness of breath, cough, nausea/vomiting, abdominal pain, diarrhea, constipation, changes in urinary habits. Remainder of ROS negative.     ED VS: T 98.9-99.2F; -122; -134/63-83; RR 18-22; Sp02 96-98 RA  ED Course: s/p 3L NS, banana bag x1, Ativan 1 mg IVP x3, Librium 25 mg PO x2; labs with no leukocytosis, notable for an AG 26-->20 (s/p fluids), EtOH 313; VBG c/w respiratory alkalosis (pH 7.45, pC03 36) 44 y/o F PMHX EtOH, HTN presenting to [facility] ED [facility] complaints of anxiety and palpitations since early AM 1/14. Patient reports seeing her Cardiologist, Dr Martinez, this morning around 9 am for reported palpitations that she has been experiencing since last Wednesday, 1/9. Patient notes starting Monday, 1/7 she began reporting flu-like symptoms with reported diarrhea/vomiting (approx 5 episodes, since resolved). Patient was still feeling ill on Wednesday and decided to take 1 Tamiflu, after which she reports experiencing palpitations since. Since then, patient has noted poor PO intake as well. Patient was advised to proceed to the ED for further evaluation from her cardiologist's office. Patient also notes a long-standing history of drinking, where she reports drinking 1/2-1/3 L rum per day for the past 8 years. Patient reports that her job is high-stress which reports was the inciting factor to her drinking. Denies any history of DTs, withdrawal seizures, or admissions for EtOH withdrawal. Last drink: 5 am on 1/14.    ROS today notable for mild diaphoresis, tremors and feeling unsteady on her feet. Otherwise, patient denies any fevers, chills, NS, chest pain, shortness of breath, cough, nausea/vomiting, abdominal pain, diarrhea, constipation, changes in urinary habits. Remainder of ROS negative.     ED VS: T 98.9-99.2F; -122; -134/63-83; RR 18-22; Sp02 96-98 RA  ED Course: s/p 3L NS, banana bag x1, Ativan 1 mg IVP x3, Librium 25 mg PO x2; labs with no leukocytosis, notable for an AG 26-->20 (s/p fluids), EtOH 313; bloodwork with triple acid base disorder (HAGMA, respiratory alkalosis, metabolic alkalosis); ICU consulted in light of persistently elevated HRs, deemed appropriate for RMFs

## 2019-01-15 NOTE — H&P ADULT - PROBLEM SELECTOR PLAN 4
-Abdominal US Patient with transaminitis on blood work on admission, AST>ALT (not 2:1 ratio as seen in EtOH abuse); no hepatomegaly appreciated on exam with no associated tenderness   -continue to monitor LFTs  -Abdominal US to assess for cirrhosis Patient with anion gap on admission, likely 2/2 alcoholic ketoacidosis, pending UA, with reported decreased PO intake over the last week. AG improved s/p IVF  -f/u UA to assess for ketones  -Continue to monitor AG on AM BMP    ADDENDUM: likely mixed acid base disorder as patient w/ increased AG, but normal HCO3 (metabolic acidosis w/ respiratory alkalosis and metabolic alkalosis from contraction); c/w banana bag, monitor BMP

## 2019-01-15 NOTE — DISCHARGE NOTE ADULT - PATIENT PORTAL LINK FT
You can access the [facility] Patient Portal, offered by [facility], by registering with the following website: [email or web address]

## 2019-01-15 NOTE — H&P ADULT - FAMILY HISTORY
Mother  Still living? Unknown  Family history of Parkinson disease, Age at diagnosis: Age Unknown  Family history of osteoporosis, Age at diagnosis: Age Unknown Mother  Still living? Unknown  Family history of Parkinson disease, Age at diagnosis: Age Unknown  Family history of osteoporosis, Age at diagnosis: Age Unknown     Father  Still living? Unknown  No family history of cardiovascular disease, Age at diagnosis: Age Unknown

## 2019-01-15 NOTE — H&P ADULT - PROBLEM SELECTOR PLAN 8
F: s/p 3L NS, banana bag in ED  E: Replete electrolytes PRN, Goal: K>4, Mg>2  N: DASH/TLC diet    DVT ppx: SCDs, HepSUbQ  CODE: FULL  Dispo: Admit to F

## 2019-01-15 NOTE — DISCHARGE NOTE ADULT - CARE PROVIDER_API CALL
German Martinez), Cardiovascular Disease; Internal Medicine  [address] [place] [id number]  Phone: [phone]  Fax: [phone]

## 2019-01-15 NOTE — PROGRESS NOTE ADULT - PROBLEM SELECTOR PLAN 3
deanna edward of withdrawl ; encouraged ETOH cessation History as above, drink 1/3 to 1/2 l of rum per day for the past 8 year  - encourage alcohol cessation

## 2019-01-15 NOTE — H&P ADULT - NSHPLABSRESULTS_GEN_ALL_CORE
.  LABS:                         15.6   5.2   )-----------( 167      ( 14 Jan 2019 10:46 )             44.8     01-14    138  |  96  |  5<L>  ----------------------------<  131<H>  4.0   |  22  |  0.56    Ca    8.7      14 Jan 2019 19:55    TPro  8.1  /  Alb  5.0  /  TBili  0.7  /  DBili  x   /  AST  146<H>  /  ALT  123<H>  /  AlkPhos  57  01-14                  RADIOLOGY, EKG & ADDITIONAL TESTS: Reviewed.

## 2019-01-15 NOTE — PROGRESS NOTE ADULT - PROBLEM SELECTOR PLAN 9
1) PCP Contacted on Admission: (Y/N) --> Name & Phone #: German Martinez MD ([address] [place] [address], [place] [address]); Ph: [phone]  2) Date of Contact with PCP:  3) PCP Contacted at Discharge: (Y/N)  4) Summary of Handoff Given to PCP:   5) Post-Discharge Appointment Date and Location:

## 2019-01-15 NOTE — PROGRESS NOTE ADULT - PROBLEM SELECTOR PLAN 7
Patient with history of HTN, on Amlodipine 2. 5 mg daily; BPs stable  -c/w home medication Patient with history of HTN, on Amlodipine 2. 5 mg daily; BPs stable  -c/w home Amlodipine

## 2019-01-15 NOTE — H&P ADULT - PROBLEM SELECTOR PLAN 1
Patient presenting to [facility] ED with complaints of anxiety, palpitations, long-standing history of EtOH abuse (drinks 1/2 bottle gin/day for the last 8 years), EtOH level on admission 313; last drink: 5 am on 1/14; s/p Librium 25 mg PO x2, Ativan 1 mg IVP x3  -Librium ______  -Ativan 1 mg IVP PRN for CIWA >8 Patient presenting to [facility] ED with complaints of anxiety, palpitations, long-standing history of EtOH abuse (drinks 1/2 bottle gin/day for the last 8 years), no prior history of withdrawal seizures, DTs or admissions for EtOH withdrawal in the past; EtOH level on admission 313; last drink: 5 am on 1/14; s/p Librium 25 mg PO x2, Ativan 1 mg IVP x3, s/p 3 L NS in ED, CIWA 3 for mild diaphoresis and tremor during encounter.  -Librium 25 mg q8h  -Ativan 1 mg IVP PRN for CIWA >8  -banana bag  -CIWA Protocol, monitor q2-4h  -PT consult for weakness  -Folic Acid/MVI/Thiamine

## 2019-01-15 NOTE — PROGRESS NOTE ADULT - ASSESSMENT
42 y/o F PMHX EtOH, HTN presenting to [facility] ED with complaints of anxiety and palpitations since early AM 1/14, now admitted for alcohol withdrawal. Has received a total of 4 mg Ativan since in the hospital with most recent CIWA of 5 at 8 AM.

## 2019-01-15 NOTE — H&P ADULT - PROBLEM SELECTOR PLAN 5
Patient with anion gap on admission, likely 2/2 alcoholic ketoacidosis, pending UA, with reported decreased PO intake over the last week. AG improved s/p IVF  -f/u UA to assess for ketones  -Continue to monitor AG on AM BMP Patient with anion gap on admission, likely 2/2 alcoholic ketoacidosis, pending UA, with reported decreased PO intake over the last week. AG improved s/p IVF  -f/u UA to assess for ketones  -Continue to monitor AG on AM BMP    ADDENDUM: likely mixed acid base disorder as patient w/ increased AG, but normal HCO3 (metabolic acidosis w/ respiratory alkalosis and metabolic alkalosis from contraction); c/w banana bag, monitor BMP Patient with transaminitis on blood work on admission, AST>ALT (not 2:1 ratio as seen in EtOH abuse); no hepatomegaly appreciated on exam with no associated tenderness   -continue to monitor LFTs  -Abdominal US to assess for cirrhosis

## 2019-01-15 NOTE — H&P ADULT - PROBLEM SELECTOR PLAN 3
Patient with history of HTN, on Amlodipine 2. 5 mg daily; BPs stable  -c/w home medication deanna edward of withdrawl ; encouraged ETOH cessation

## 2019-01-15 NOTE — PROGRESS NOTE ADULT - PROBLEM SELECTOR PLAN 4
Patient with anion gap on admission, likely 2/2 alcoholic ketoacidosis, pending UA, with reported decreased PO intake over the last week. AG improved s/p IVF  -f/u UA to assess for ketones  -Continue to monitor AG on AM BMP    ADDENDUM: likely mixed acid base disorder as patient w/ increased AG, but normal HCO3 (metabolic acidosis w/ respiratory alkalosis and metabolic alkalosis from contraction); c/w banana bag, monitor BMP Patient with anion gap on admission, likely 2/2 alcoholic ketoacidosis, resolved s/p fluids  -f/u UA   -Continue to monitor AG on AM BMP, now 17

## 2019-01-15 NOTE — DISCHARGE NOTE ADULT - MEDICATION SUMMARY - MEDICATIONS TO TAKE
I will START or STAY ON the medications listed below when I get home from the hospital:    Allegra  -- Indication: For Allergies    amLODIPine  -- 2.5 milligram(s) by mouth once a day  -- Indication: For Hypertension    Multiple Vitamins oral tablet  -- 1 tab(s) by mouth once a day  -- Indication: For Alcohol abuse    folic acid 1 mg oral tablet  -- 1 tab(s) by mouth once a day  -- Indication: For Alcohol abuse    thiamine 100 mg oral tablet  -- 1 tab(s) by mouth once a day  -- Indication: For Alcohol abuse

## 2019-01-15 NOTE — H&P ADULT - PROBLEM SELECTOR PLAN 6
F: s/p 3L NS, banana bag in ED  E: Replete electrolytes PRN, Goal: K>4, Mg>2  N: DASH/TLC diet    DVT ppx:  CODE: FULL  Dispo: Admit to [facility] F: s/p 3L NS, banana bag in ED  E: Replete electrolytes PRN, Goal: K>4, Mg>2  N: DASH/TLC diet    DVT ppx: SCDs, HepSUbQ  CODE: FULL  Dispo: Admit to F ADDENDUM:  EKG w/ sinus tachycardia, occuring in setting of ETOH withdrawl, unlikely from Tamiflu , palpitations felt after pt took Tamiflu, however pt w/ low ETOH intake prior to sxs, probably experiencing effects of withdrawl. monitor for worsening sxs

## 2019-01-15 NOTE — H&P ADULT - NSHPPHYSICALEXAM_GEN_ALL_CORE
.  VITAL SIGNS:  T(C): 37.2 (01-14-19 @ 21:36), Max: 37.3 (01-14-19 @ 19:12)  T(F): 98.9 (01-14-19 @ 21:36), Max: 99.2 (01-14-19 @ 19:12)  HR: 110 (01-14-19 @ 21:36) (110 - 122)  BP: 134/82 (01-14-19 @ 21:36) (102/63 - 134/82)  BP(mean): --  RR: 22 (01-14-19 @ 21:36) (18 - 22)  SpO2: 97% (01-14-19 @ 21:36) (96% - 98%)  Wt(kg): --    PHYSICAL EXAM:    Constitutional: WDWN resting comfortably in bed; NAD  Head: NC/AT  Eyes: PERRL, EOMI, anicteric sclera  ENT: no nasal discharge; uvula midline, no oropharyngeal erythema or exudates; MMM  Neck: supple; no JVD or thyromegaly  Respiratory: CTA B/L; no W/R/R, no retractions  Cardiac: +S1/S2; RRR; no M/R/G; PMI non-displaced  Gastrointestinal: abdomen soft, NT/ND; no rebound or guarding; +BSx4  Genitourinary: normal external genitalia  Back: spine midline, no bony tenderness or step-offs; no CVAT B/L  Extremities: WWP, no clubbing or cyanosis; no peripheral edema  Musculoskeletal: NROM x4; no joint swelling, tenderness or erythema  Vascular: 2+ radial, femoral, DP/PT pulses B/L  Dermatologic: skin warm, dry and intact; no rashes, wounds, or scars  Lymphatic: no submandibular or cervical LAD  Neurologic: AAOx3; CNII-XII grossly intact; no focal deficits  Psychiatric: affect and characteristics of appearance, verbalizations, behaviors are appropriate .  VITAL SIGNS:  T(C): 37.2 (01-14-19 @ 21:36), Max: 37.3 (01-14-19 @ 19:12)  T(F): 98.9 (01-14-19 @ 21:36), Max: 99.2 (01-14-19 @ 19:12)  HR: 110 (01-14-19 @ 21:36) (110 - 122)  BP: 134/82 (01-14-19 @ 21:36) (102/63 - 134/82)  BP(mean): --  RR: 22 (01-14-19 @ 21:36) (18 - 22)  SpO2: 97% (01-14-19 @ 21:36) (96% - 98%)  Wt(kg): --    PHYSICAL EXAM:    Constitutional: WDWN  female, resting comfortably in bed; NAD, does not appear anxious  Head: NC/AT  Eyes: PERRL, EOMI, anicteric sclera  ENT: no nasal discharge; uvula midline, no oropharyngeal erythema or exudates; MMM  Neck: supple; no JVD or thyromegaly  Respiratory: CTA B/L; no W/R/R, no retractions  Cardiac: +S1/S2; RRR; no M/R/G; PMI non-displaced  Gastrointestinal: abdomen soft, NT/ND; no rebound or guarding; +BSx4  Genitourinary: normal external genitalia  Back: spine midline, no bony tenderness or step-offs; no CVAT B/L  Extremities: WWP, no clubbing or cyanosis; no peripheral edema  Musculoskeletal: NROM x4; no joint swelling, tenderness or erythema; 5/5 strength throughout  Vascular: 2+ radial, femoral, DP/PT pulses B/L  Dermatologic: skin warm, dry and intact; no rashes, wounds, or scars  Lymphatic: no submandibular or cervical LAD  Neurologic: AAOx3; CNII-XII grossly intact; no focal deficits; mild tremor with extension of UE b/l; intact heel-to-shin exam; no dysmetria  Psychiatric: affect and characteristics of appearance, verbalizations, behaviors are appropriate

## 2019-01-15 NOTE — PROGRESS NOTE ADULT - PROBLEM SELECTOR PLAN 8
F: s/p 3L NS, banana bag in ED  E: Replete electrolytes PRN, Goal: K>4, Mg>2  N: DASH/TLC diet    DVT ppx: SCDs, HepSUbQ  CODE: FULL  Dispo: Admit to F F: s/p 3L NS, banana bag in ED, now just taking PO  E: Replete PRN to K>4 and Mg>2  N: DASH/TLC diet    DVT ppx: SCDs, HepSubQ  CODE: FULL  Dispo: [facility]

## 2019-01-15 NOTE — PROGRESS NOTE ADULT - PROBLEM SELECTOR PLAN 2
Patient meeting SIRS criteria on admission with tachycardia (up to 122) and tachypnea (up to 22), 2/2 EtOH withdrawal. No suspicion for infectious etiology as afebrile and with no leukocytosis.   -Plan as per above for Problem #1 Patient meeting SIRS criteria on admission with tachycardia and tachypnea but likely 2/2 EtOH withdrawal. No suspicion for infectious etiology as afebrile and with no leukocytosis.   - As per problem #1

## 2019-01-15 NOTE — H&P ADULT - NSHPSOCIALHISTORY_GEN_ALL_CORE
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

## 2019-01-15 NOTE — DISCHARGE NOTE ADULT - HOSPITAL COURSE
42 y/o F PMHX EtOH, HTN presenting to [facility] ED with complaints of anxiety and palpitations since early AM 1/14, admitted for alcohol withdrawal. Patient without history of alcohol withdrawal, w/d seizures, DTs. She was treated by giving Ativan PRN and starting Librium. She is symptomatically improving as her CIWA scores. She is stable and improved for discharge on librium taper for 2 more days. She was counselled on signs and symptoms of alcohol withdrawal and what symptoms should bring her back to the hospital. Please follow up with your PCP/Cardiologist Dr. Martinez within one week of discharge. 42 y/o F PMHX EtOH, HTN presenting to [facility] ED with complaints of anxiety and palpitations since early AM 1/14, admitted for alcohol withdrawal. Patient without history of alcohol withdrawal, w/d seizures, DTs. She was treated by giving Ativan PRN and starting Librium. She is symptomatically improving and her CIWA scores have been persistently <8. She is stable and improved for discharge on librium taper for 2 more days. She was counselled on signs and symptoms of alcohol withdrawal and what symptoms should bring her back to the hospital. The patient will follow-up with her PCP/Cardiologist Dr. Martinez within one week of discharge.

## 2019-01-15 NOTE — H&P ADULT - PROBLEM SELECTOR PLAN 9
1) PCP Contacted on Admission: (Y/N) --> Name & Phone #: German Martinez MD ([address] [place] [address], [place] [address]); Ph: [phone]  2) Date of Contact with PCP:  3) PCP Contacted at Discharge: (Y/N)  4) Summary of Handoff Given to PCP:   5) Post-Discharge Appointment Date and Location:

## 2019-01-15 NOTE — PROGRESS NOTE ADULT - SUBJECTIVE AND OBJECTIVE BOX
INTERVAL HPI/OVERNIGHT EVENTS: Patient was admitted overnight. The patient was given a total of 4 mg of Ativan and hasn't gotten any Ativan since midnight.      SUBJECTIVE: Patient seen and examined at bedside. This morning the patient reports that she is tired. Did not fall asleep until 5 PM. She reports that she is feeling "relaxed" at this time although she seems a bit anxious. Otherwise reports that she does not have any nasuea, vomiting, headaches, auditory, visual, or tactile disturbances. Alert and oriented.     OBJECTIVE:    VITAL SIGNS:  ICU Vital Signs Last 24 Hrs  T(C): 37 (15 Sunil 2019 05:20), Max: 37.3 (14 Jan 2019 19:12)  T(F): 98.6 (15 Sunil 2019 05:20), Max: 99.2 (14 Jan 2019 19:12)  HR: 116 (15 Sunil 2019 05:20) (110 - 122)  BP: 134/80 (15 Sunil 2019 05:20) (102/63 - 141/82)  BP(mean): --  ABP: --  ABP(mean): --  RR: 20 (15 Sunil 2019 05:20) (18 - 22)  SpO2: 97% (15 Sunil 2019 05:20) (96% - 98%)    CAPILLARY BLOOD GLUCOSE      POCT Blood Glucose.: 99 mg/dL (14 Jan 2019 11:12)      PHYSICAL EXAM:    General: middle-aged woman sitting up in bed in NAD, looks somewhat tremulous and anxious.  HEENT: NC/AT; PERRL, clear conjunctiva  Neck: supple, no JVD  Respiratory: lungs CTA b/l, no wheezes, rales, or rhonchi  Cardiovascular: tachycardic, +S1/S2; no murmurs, rubs, or gallops  Abdomen: soft, non-tender, non-distended; +BS in all 4 quadrants  Extremities: WWP, 2+ peripheral pulses b/l; no LE edema  Skin: normal color and turgor; no rash, no diaphoresis  Neurological: AOx3, CN II-XII intact, no focal deficits noted    MEDICATIONS:  MEDICATIONS  (STANDING):  amLODIPine   Tablet 2.5 milliGRAM(s) Oral daily  chlordiazePOXIDE 25 milliGRAM(s) Oral every 8 hours  folic acid 1 milliGRAM(s) Oral daily  heparin  Injectable 5000 Unit(s) SubCutaneous every 8 hours  influenza   Vaccine 0.5 milliLiter(s) IntraMuscular once  magnesium sulfate  IVPB 2 Gram(s) IV Intermittent once  multivitamin 1 Tablet(s) Oral daily  multivitamin/thiamine/folic acid in sodium chloride 0.9% 1000 milliLiter(s) (125 mL/Hr) IV Continuous <Continuous>  potassium chloride    Tablet ER 20 milliEquivalent(s) Oral once  thiamine 100 milliGRAM(s) Oral daily    MEDICATIONS  (PRN):  LORazepam   Injectable 1 milliGRAM(s) IntraMuscular once PRN Nausea and/or Vomiting      ALLERGIES:  Allergies    predniSONE (Rash)  Tamiflu (Other)    Intolerances        LABS:                        13.5   5.3   )-----------( 147      ( 15 Sunil 2019 05:38 )             40.5     01-15    138  |  97  |  7   ----------------------------<  92  3.8   |  24  |  0.56    Ca    8.9      15 Sunil 2019 05:38  Mg     1.6     01-15    TPro  6.7  /  Alb  4.3  /  TBili  1.0  /  DBili  x   /  AST  99<H>  /  ALT  89<H>  /  AlkPhos  44  01-15      RADIOLOGY & ADDITIONAL TESTS: Reviewed. INTERVAL HPI/OVERNIGHT EVENTS: Patient was admitted overnight. The patient was given a total of 4 mg of Ativan and hasn't gotten any Ativan since midnight.      SUBJECTIVE: Patient seen and examined at bedside. This morning the patient reports that she is tired. Did not fall asleep until 5 AM. She reports that she is feeling "relaxed" at this time although she seems a bit anxious. Otherwise reports that she does not have any nasuea, vomiting, headaches, auditory, visual, or tactile disturbances. Alert and oriented.     OBJECTIVE:    VITAL SIGNS:  ICU Vital Signs Last 24 Hrs  T(C): 37 (15 Sunil 2019 05:20), Max: 37.3 (14 Jan 2019 19:12)  T(F): 98.6 (15 Sunil 2019 05:20), Max: 99.2 (14 Jan 2019 19:12)  HR: 116 (15 Sunil 2019 05:20) (110 - 122)  BP: 134/80 (15 Sunil 2019 05:20) (102/63 - 141/82)  BP(mean): --  ABP: --  ABP(mean): --  RR: 20 (15 Sunil 2019 05:20) (18 - 22)  SpO2: 97% (15 Sunil 2019 05:20) (96% - 98%)    CAPILLARY BLOOD GLUCOSE      POCT Blood Glucose.: 99 mg/dL (14 Jan 2019 11:12)      PHYSICAL EXAM:    General: middle-aged woman sitting up in bed in NAD, looks somewhat tremulous and anxious.  HEENT: NC/AT; PERRL, clear conjunctiva  Neck: supple, no JVD  Respiratory: lungs CTA b/l, no wheezes, rales, or rhonchi  Cardiovascular: tachycardic, +S1/S2; no murmurs, rubs, or gallops  Abdomen: soft, non-tender, non-distended; +BS in all 4 quadrants  Extremities: WWP, 2+ peripheral pulses b/l; no LE edema  Skin: normal color and turgor; no rash, no diaphoresis  Neurological: AOx3, CN II-XII intact, no focal deficits noted    MEDICATIONS:  MEDICATIONS  (STANDING):  amLODIPine   Tablet 2.5 milliGRAM(s) Oral daily  chlordiazePOXIDE 25 milliGRAM(s) Oral every 8 hours  folic acid 1 milliGRAM(s) Oral daily  heparin  Injectable 5000 Unit(s) SubCutaneous every 8 hours  influenza   Vaccine 0.5 milliLiter(s) IntraMuscular once  magnesium sulfate  IVPB 2 Gram(s) IV Intermittent once  multivitamin 1 Tablet(s) Oral daily  multivitamin/thiamine/folic acid in sodium chloride 0.9% 1000 milliLiter(s) (125 mL/Hr) IV Continuous <Continuous>  potassium chloride    Tablet ER 20 milliEquivalent(s) Oral once  thiamine 100 milliGRAM(s) Oral daily    MEDICATIONS  (PRN):  LORazepam   Injectable 1 milliGRAM(s) IntraMuscular once PRN Nausea and/or Vomiting      ALLERGIES:  Allergies    predniSONE (Rash)  Tamiflu (Other)    Intolerances        LABS:                        13.5   5.3   )-----------( 147      ( 15 Sunil 2019 05:38 )             40.5     01-15    138  |  97  |  7   ----------------------------<  92  3.8   |  24  |  0.56    Ca    8.9      15 Sunil 2019 05:38  Mg     1.6     01-15    TPro  6.7  /  Alb  4.3  /  TBili  1.0  /  DBili  x   /  AST  99<H>  /  ALT  89<H>  /  AlkPhos  44  01-15      RADIOLOGY & ADDITIONAL TESTS: Reviewed.

## 2019-01-15 NOTE — H&P ADULT - ASSESSMENT
44 y/o F PMHX EtOH, HTN presenting to [facility] ED wtih complaints of anxiety and palpitations since early AM 1/14, admitted for EtOH withdrawal.

## 2019-01-15 NOTE — PROGRESS NOTE ADULT - PROBLEM SELECTOR PLAN 1
Patient presenting to [facility] ED with complaints of anxiety, palpitations, long-standing history of EtOH abuse (drinks 1/2 bottle gin/day for the last 8 years), no prior history of withdrawal seizures, DTs or admissions for EtOH withdrawal in the past; EtOH level on admission 313; last drink: 5 am on 1/14; s/p Librium 25 mg PO x2, Ativan 1 mg IVP x3, s/p 3 L NS in ED, CIWA 3 for mild diaphoresis and tremor during encounter.  -Librium 25 mg q8h  -Ativan 1 mg IVP PRN for CIWA >8  -banana bag  -CIWA Protocol, monitor q2-4h  -PT consult for weakness  -Folic Acid/MVI/Thiamine Patient presenting to [facility] ED with complaints of anxiety, palpitations, long-standing history of EtOH abuse (drinks 1/2 bottle rum/day for the last 8 years), no prior history of withdrawal seizures, DTs or admissions for EtOH withdrawal in the past; EtOH level on admission 313; last drink: 5 am on 1/14; s/p Librium 25 mg PO x2, Ativan 1 mg IVP x4, s/p 3 L NS in ED  - CIWA at 8 AM is 5  - increase Librium to 50 mg q8h  - Ativan 1 mg IVP PRN for CIWA >8  - banana bag completed  - CIWA Protocol, monitor q2-4h  - f/u PT eval for weakness  - Folic Acid/MVI/Thiamine

## 2019-01-15 NOTE — H&P ADULT - PROBLEM SELECTOR PLAN 7
1) PCP Contacted on Admission: (Y/N) --> Name & Phone #:  2) Date of Contact with PCP:  3) PCP Contacted at Discharge: (Y/N)  4) Summary of Handoff Given to PCP:   5) Post-Discharge Appointment Date and Location: 1) PCP Contacted on Admission: (Y/N) --> Name & Phone #: German Martinez MD ([address] [place] [address], [place] [address]); Ph: [phone]  2) Date of Contact with PCP:  3) PCP Contacted at Discharge: (Y/N)  4) Summary of Handoff Given to PCP:   5) Post-Discharge Appointment Date and Location: Patient with history of HTN, on Amlodipine 2. 5 mg daily; BPs stable  -c/w home medication

## 2019-01-15 NOTE — PROGRESS NOTE ADULT - PROBLEM SELECTOR PLAN 6
ADDENDUM:  EKG w/ sinus tachycardia, occuring in setting of ETOH withdrawl, unlikely from Tamiflu , palpitations felt after pt took Tamiflu, however pt w/ low ETOH intake prior to sxs, probably experiencing effects of withdrawl. monitor for worsening sxs EKG w/ sinus tachycardia, occuring in setting of ETOH withdrawl, unlikely from Tamiflu , palpitations felt after pt took Tamiflu, however pt w/ low ETOH intake prior to sxs, probably experiencing effects of withdrawl. monitor for worsening sxs  - continues to EKG w/ sinus tachycardia, occuring in setting of ETOH withdrawl, unlikely from Tamiflu , palpitations felt after pt took Tamiflu, however pt w/ low ETOH intake prior to sxs, probably experiencing effects of withdrawal.  - continues to have palpitations and tachycardia  - continue to treat for withdrawal as above  - EKG sinus tahcycardia

## 2019-01-15 NOTE — DISCHARGE NOTE ADULT - CARE PLAN
Principal Discharge DX:	Alcohol withdrawal  Goal:	To treat your symptoms  Assessment and plan of treatment:	You came in with symptoms related to alcohol withdrawal. You were treated with medications that help your withdrawal symptoms. You will take these medications for two more days.  Secondary Diagnosis:	Hypertension  Assessment and plan of treatment:	Please continue to take your blood pressure medication and follow up with your primary care doctor or cardiologist to have you blood pressure monitored. Principal Discharge DX:	Alcohol withdrawal  Goal:	To treat your symptoms  Assessment and plan of treatment:	You came in with symptoms related to alcohol withdrawal. You were treated with medications that help your withdrawal symptoms including Librium and Ativan. You will take these medications for two more days as noted on this document  Secondary Diagnosis:	Hypertension  Assessment and plan of treatment:	Please continue to take your blood pressure medication and follow up with your primary care doctor or cardiologist to have you blood pressure monitored. Your blood pressure has been controlled since you have been hospitalized.

## 2019-01-15 NOTE — PROGRESS NOTE ADULT - PROBLEM SELECTOR PLAN 5
Patient with transaminitis on blood work on admission, AST>ALT (not 2:1 ratio as seen in EtOH abuse); no hepatomegaly appreciated on exam with no associated tenderness   -continue to monitor LFTs  -Abdominal US to assess for cirrhosis Patient with transaminitis on blood work on admission, AST>ALT (not 2:1 ratio as seen in EtOH abuse); no hepatomegaly appreciated on exam with no associated tenderness   -continue to monitor LFTs, improving s/p fluids

## 2019-01-15 NOTE — H&P ADULT - PROBLEM SELECTOR PLAN 2
Patient meeting SIRS criteria on admission with tachycardia (up to 122) and tachypnea (up to 22), 2/2 EtOH withdrawal. No suspicion for infectious etiology as afebrile and with no leukocytosis.   -Plan as per above for Problem #1

## 2019-01-18 DIAGNOSIS — E87.2 ACIDOSIS: ICD-10-CM

## 2019-01-18 DIAGNOSIS — E87.4 MIXED DISORDER OF ACID-BASE BALANCE: ICD-10-CM

## 2019-01-18 DIAGNOSIS — I10 ESSENTIAL (PRIMARY) HYPERTENSION: ICD-10-CM

## 2019-01-18 DIAGNOSIS — F10.10 ALCOHOL ABUSE, UNCOMPLICATED: ICD-10-CM

## 2019-01-18 DIAGNOSIS — F10.239 ALCOHOL DEPENDENCE WITH WITHDRAWAL, UNSPECIFIED: ICD-10-CM
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
## 2019-01-18 DIAGNOSIS — R74.0 NONSPECIFIC ELEVATION OF LEVELS OF TRANSAMINASE AND LACTIC ACID DEHYDROGENASE [LDH]: ICD-10-CM

## 2019-01-30 VITALS
DIASTOLIC BLOOD PRESSURE: 89 MMHG | WEIGHT: 147.71 LBS | HEART RATE: 102 BPM | OXYGEN SATURATION: 100 % | SYSTOLIC BLOOD PRESSURE: 144 MMHG | RESPIRATION RATE: 18 BRPM | TEMPERATURE: 99 F

## 2019-01-30 NOTE — ED ADULT TRIAGE NOTE - CHIEF COMPLAINT QUOTE
Patient states " racing heart rate, alcohol detox."  States took RUM tonight and has palpitations, no chest pain or SOB, no shaking tremors, no nausea/vomitting.

## 2019-01-30 NOTE — ED ADULT TRIAGE NOTE - OTHER COMPLAINTS
Patient states saw Dr. Goodrich at [facility] and told to go to ED ; states called Dr. Kahn and MD aware patient coming for admission and Detox.  Patient states last drink this evening RUM.

## 2019-01-31 ENCOUNTER — INPATIENT (INPATIENT)
Facility: HOSPITAL | Age: 44
LOS: 1 days | Discharge: HOME | DRG: 897 | End: 2019-02-02
Attending: INTERNAL MEDICINE | Admitting: INTERNAL MEDICINE
Payer: COMMERCIAL

## 2019-01-31 DIAGNOSIS — Z29.9 ENCOUNTER FOR PROPHYLACTIC MEASURES, UNSPECIFIED: ICD-10-CM

## 2019-01-31 DIAGNOSIS — R63.8 OTHER SYMPTOMS AND SIGNS CONCERNING FOOD AND FLUID INTAKE: ICD-10-CM

## 2019-01-31 DIAGNOSIS — F10.239 ALCOHOL DEPENDENCE WITH WITHDRAWAL, UNSPECIFIED: ICD-10-CM

## 2019-01-31 DIAGNOSIS — I10 ESSENTIAL (PRIMARY) HYPERTENSION: ICD-10-CM

## 2019-01-31 DIAGNOSIS — Z98.890 OTHER SPECIFIED POSTPROCEDURAL STATES: Chronic | ICD-10-CM

## 2019-01-31 DIAGNOSIS — F10.10 ALCOHOL ABUSE, UNCOMPLICATED: ICD-10-CM

## 2019-01-31 DIAGNOSIS — R74.0 NONSPECIFIC ELEVATION OF LEVELS OF TRANSAMINASE AND LACTIC ACID DEHYDROGENASE [LDH]: ICD-10-CM

## 2019-01-31 DIAGNOSIS — Z91.89 OTHER SPECIFIED PERSONAL RISK FACTORS, NOT ELSEWHERE CLASSIFIED: ICD-10-CM

## 2019-01-31 LAB
ALBUMIN SERPL ELPH-MCNC: 4.1 G/DL — SIGNIFICANT CHANGE UP (ref 3.3–5)
ALP SERPL-CCNC: 42 U/L — SIGNIFICANT CHANGE UP (ref 40–120)
ALT FLD-CCNC: 47 U/L — HIGH (ref 10–45)
ANION GAP SERPL CALC-SCNC: 12 MMOL/L — SIGNIFICANT CHANGE UP (ref 5–17)
AST SERPL-CCNC: 38 U/L — SIGNIFICANT CHANGE UP (ref 10–40)
BILIRUB SERPL-MCNC: 0.6 MG/DL — SIGNIFICANT CHANGE UP (ref 0.2–1.2)
BUN SERPL-MCNC: 10 MG/DL — SIGNIFICANT CHANGE UP (ref 7–23)
CALCIUM SERPL-MCNC: 9 MG/DL — SIGNIFICANT CHANGE UP (ref 8.4–10.5)
CHLORIDE SERPL-SCNC: 105 MMOL/L — SIGNIFICANT CHANGE UP (ref 96–108)
CO2 SERPL-SCNC: 24 MMOL/L — SIGNIFICANT CHANGE UP (ref 22–31)
CREAT SERPL-MCNC: 0.57 MG/DL — SIGNIFICANT CHANGE UP (ref 0.5–1.3)
GLUCOSE SERPL-MCNC: 81 MG/DL — SIGNIFICANT CHANGE UP (ref 70–99)
HCT VFR BLD CALC: 38.5 % — SIGNIFICANT CHANGE UP (ref 34.5–45)
HGB BLD-MCNC: 12.7 G/DL — SIGNIFICANT CHANGE UP (ref 11.5–15.5)
MAGNESIUM SERPL-MCNC: 1.8 MG/DL — SIGNIFICANT CHANGE UP (ref 1.6–2.6)
MCHC RBC-ENTMCNC: 32.7 PG — SIGNIFICANT CHANGE UP (ref 27–34)
MCHC RBC-ENTMCNC: 33 G/DL — SIGNIFICANT CHANGE UP (ref 32–36)
MCV RBC AUTO: 99.2 FL — SIGNIFICANT CHANGE UP (ref 80–100)
PLATELET # BLD AUTO: 196 K/UL — SIGNIFICANT CHANGE UP (ref 150–400)
POTASSIUM SERPL-MCNC: 3.9 MMOL/L — SIGNIFICANT CHANGE UP (ref 3.5–5.3)
POTASSIUM SERPL-SCNC: 3.9 MMOL/L — SIGNIFICANT CHANGE UP (ref 3.5–5.3)
PROT SERPL-MCNC: 6.7 G/DL — SIGNIFICANT CHANGE UP (ref 6–8.3)
RBC # BLD: 3.88 M/UL — SIGNIFICANT CHANGE UP (ref 3.8–5.2)
RBC # FLD: 13.3 % — SIGNIFICANT CHANGE UP (ref 10.3–16.9)
SODIUM SERPL-SCNC: 141 MMOL/L — SIGNIFICANT CHANGE UP (ref 135–145)
WBC # BLD: 6.7 K/UL — SIGNIFICANT CHANGE UP (ref 3.8–10.5)
WBC # FLD AUTO: 6.7 K/UL — SIGNIFICANT CHANGE UP (ref 3.8–10.5)

## 2019-01-31 PROCEDURE — 93010 ELECTROCARDIOGRAM REPORT: CPT

## 2019-01-31 PROCEDURE — 99285 EMERGENCY DEPT VISIT HI MDM: CPT | Mod: 25

## 2019-01-31 RX ORDER — FOLIC ACID 0.8 MG
1 TABLET ORAL DAILY
Qty: 0 | Refills: 0 | Status: DISCONTINUED | OUTPATIENT
Start: 2019-01-31 | End: 2019-02-02

## 2019-01-31 RX ORDER — LORATADINE 10 MG/1
10 TABLET ORAL DAILY
Qty: 0 | Refills: 0 | Status: DISCONTINUED | OUTPATIENT
Start: 2019-01-31 | End: 2019-02-02

## 2019-01-31 RX ORDER — FEXOFENADINE HCL 30 MG
0 TABLET ORAL
Qty: 0 | Refills: 0 | COMMUNITY

## 2019-01-31 RX ORDER — SODIUM CHLORIDE 9 MG/ML
1000 INJECTION, SOLUTION INTRAVENOUS
Qty: 0 | Refills: 0 | Status: DISCONTINUED | OUTPATIENT
Start: 2019-01-31 | End: 2019-01-31

## 2019-01-31 RX ORDER — SODIUM CHLORIDE 9 MG/ML
1000 INJECTION, SOLUTION INTRAVENOUS
Qty: 0 | Refills: 0 | Status: COMPLETED | OUTPATIENT
Start: 2019-01-31 | End: 2019-01-31

## 2019-01-31 RX ORDER — INFLUENZA VIRUS VACCINE 15; 15; 15; 15 UG/.5ML; UG/.5ML; UG/.5ML; UG/.5ML
0.5 SUSPENSION INTRAMUSCULAR ONCE
Qty: 0 | Refills: 0 | Status: COMPLETED | OUTPATIENT
Start: 2019-01-31 | End: 2019-01-31

## 2019-01-31 RX ORDER — AMLODIPINE BESYLATE 2.5 MG/1
2.5 TABLET ORAL DAILY
Qty: 0 | Refills: 0 | Status: DISCONTINUED | OUTPATIENT
Start: 2019-01-31 | End: 2019-02-02

## 2019-01-31 RX ORDER — THIAMINE MONONITRATE (VIT B1) 100 MG
100 TABLET ORAL DAILY
Qty: 0 | Refills: 0 | Status: DISCONTINUED | OUTPATIENT
Start: 2019-01-31 | End: 2019-02-02

## 2019-01-31 RX ADMIN — Medication 1 MILLIGRAM(S): at 10:56

## 2019-01-31 RX ADMIN — Medication 25 MILLIGRAM(S): at 14:13

## 2019-01-31 RX ADMIN — SODIUM CHLORIDE 250 MILLILITER(S): 9 INJECTION, SOLUTION INTRAVENOUS at 02:47

## 2019-01-31 RX ADMIN — Medication 1 TABLET(S): at 10:57

## 2019-01-31 RX ADMIN — Medication 100 MILLIGRAM(S): at 11:23

## 2019-01-31 RX ADMIN — LORATADINE 10 MILLIGRAM(S): 10 TABLET ORAL at 10:57

## 2019-01-31 RX ADMIN — Medication 25 MILLIGRAM(S): at 05:39

## 2019-01-31 RX ADMIN — Medication 25 MILLIGRAM(S): at 21:30

## 2019-01-31 RX ADMIN — Medication 0.5 MILLIGRAM(S): at 01:39

## 2019-01-31 RX ADMIN — AMLODIPINE BESYLATE 2.5 MILLIGRAM(S): 2.5 TABLET ORAL at 05:38

## 2019-01-31 NOTE — H&P ADULT - PROBLEM SELECTOR PLAN 5
F:   E: Replete electrolytes PRN  N: DASH/TLC F: Banana Bag  E: Replete electrolytes PRN  N: DASH/TLC

## 2019-01-31 NOTE — ED PROVIDER NOTE - PHYSICAL EXAMINATION
CON: ao x 3, HENMT: clear oropharynx, soft neck, HEAD: atraumatic, CV: rrr, equal pulses b/l, RESP: cta b/l, GI: +BS, soft, nontender, no rebound, no guarding, SKIN: no rash, MSK: no deformities, NEURO: mild tongue fasciculation, no resting tremors but noted tremors with outstretched hands

## 2019-01-31 NOTE — H&P ADULT - NSHPLABSRESULTS_GEN_ALL_CORE
LABS:                        14.1   9.2   )-----------( 227      ( 31 Jan 2019 00:18 )             41.3     01-31    143  |  101  |  7   ----------------------------<  77  4.2   |  26  |  0.58    Ca    10.0      31 Jan 2019 00:18  Mg     2.0     01-31    TPro  7.7  /  Alb  4.8  /  TBili  0.5  /  DBili  x   /  AST  50<H>  /  ALT  59<H>  /  AlkPhos  50  01-31

## 2019-01-31 NOTE — H&P ADULT - FAMILY HISTORY
Mother  Still living? Unknown  Family history of Parkinson disease, Age at diagnosis: Age Unknown  Family history of osteoporosis, Age at diagnosis: Age Unknown     Father  Still living? Unknown  No family history of cardiovascular disease, Age at diagnosis: Age Unknown

## 2019-01-31 NOTE — ED ADULT NURSE NOTE - OTHER COMPLAINTS
Patient states saw Dr. Goodrich at [facility] and told to go to ED ; states called Dr. Kahn and MD aware patient coming for admission and Detox.  Patient states last drink this evening RUM.

## 2019-01-31 NOTE — ED ADULT NURSE NOTE - OBJECTIVE STATEMENT
Patient presents to the ED with c/o anxiety and palpitations x 1 day. States she is attempting to stop drinking and is having a difficult time doing so on her own. States palpitations occur when she does not drink. Requesting detox. Denies CP/ SOB

## 2019-01-31 NOTE — H&P ADULT - PROBLEM SELECTOR PLAN 1
Patient presenting to [facility] ED with complaints of anxiety, palpitations, long-standing history of EtOH abuse (drinks 1/2 bottle gin/day for the last 8 years), no prior history of withdrawal seizures, DTs or admissions for EtOH withdrawal in the past; EtOH level on admission 313; last drink: 5 am on 1/14; s/p Librium 25 mg PO x2, Ativan 1 mg IVP x3, s/p 3 L NS in ED, CIWA 3 for mild diaphoresis and tremor during encounter.  -Librium 25 mg q8h  -Ativan 1 mg IVP PRN for CIWA >8  -banana bag  -CIWA Protocol, monitor q2-4h  -PT consult for weakness  -Folic Acid/MVI/Thiamine. Patient presenting to [facility] ED with complaints of anxiety, palpitations, diaphoresis, long-standing history of EtOH abuse (drinks 1/2 bottle gin/day for the last 8 years), no prior history of withdrawal seizures, DTs or admissions for EtOH withdrawal in the past  - EtOH level on admission 152; last drink this evening  - s/p Librium 25 mg PO x1, Ativan 0.5 mg IVP x1, s/p Banana Bag in ED  - CIWA 5 for mild diaphoresis, anxiety, and tremor during encounter.  - CIWA Protocol, monitor q4h  - Start Librium 25 mg q8h  - Start Ativan 1 mg IVP q2h PRN for CIWA >8  - f/u AM EKG  - Fall Precautions   - MV/Folic Acid/Thiamine  - Physical Therapy  - Wants to see Dr. Blackwood - her Neurologist in AM.

## 2019-01-31 NOTE — H&P ADULT - PROBLEM SELECTOR PLAN 3
History as above, drink 1/3 to 1/2 l of rum per day for the past 8 year  - encourage alcohol cessation. History as above, drinks 1/3 to 1/2 L of rum per day for the past 8 year  - Wants to be placed in a detoxification program.  - Social Work Consult  - Case Management

## 2019-01-31 NOTE — H&P ADULT - ASSESSMENT
A 44 y/o Female with PMH of EtOH abuse, HTN presents to [facility] with concerns of alcohol withdrawal, anxiety, diaphoresis, and palpitations after her last drink this evening, admitted for management of ETOH abuse and concern for withdrawal.

## 2019-01-31 NOTE — CONSULT NOTE ADULT - ASSESSMENT
A 42 y/o Female with PMH of EtOH abuse, HTN presents to [facility] with concerns of alcohol withdrawal, anxiety, diaphoresis, and palpitations after her last drink this evening, admitted for management of ETOH abuse and concern for withdrawal.       Problem/Plan - 1:  ·  Problem: Alcohol withdrawal.  Plan: Patient presenting to [facility] ED with complaints of anxiety, palpitations, diaphoresis, long-standing history of EtOH abuse (drinks 1/2 bottle gin/day for the last 8 years), no prior history of withdrawal seizures, DTs or admissions for EtOH withdrawal in the past  - EtOH level on admission 152; last drink this evening  - s/p Librium 25 mg PO x1, Ativan 0.5 mg IVP x1, s/p Banana Bag in ED  - CIWA 5 for mild diaphoresis, anxiety, and tremor during encounter.  - CIWA Protocol, monitor q4h  - Start Librium 25 mg q8h  - Start Ativan 1 mg IVP q2h PRN for CIWA >8  - f/u AM EKG  - Fall Precautions   - MV/Folic Acid/Thiamine

## 2019-01-31 NOTE — CONSULT NOTE ADULT - SUBJECTIVE AND OBJECTIVE BOX
Patient is a 43y old  Female who presents with a chief complaint of ETOH Withdrawal (31 Jan 2019 01:23)       HPI:  A 44 y/o Female with PMH of EtOH abuse, HTN presents to [facility] with concerns of alcohol withdrawal, anxiety, diaphoresis, and palpitations. Went to Urgent care after her last drink this evening. Began experiencing tremors and palpitations. Patient was advised to proceed to the ED for further evaluation given history of alcohol abuse. Patient also notes a long-standing history of drinking, where she reports drinking 1/2-1/3 L rum per day for the past 8 years. Patient reports that her job is high-stress which reports was the inciting factor to her drinking. Denies any history of DTs, withdrawal seizures, or admissions for EtOH withdrawal. States she wants to be placed in a detoxification program. Denies any fevers, chills, headaches, chest pain, shortness of breath, cough, nausea/vomiting, abdominal pain, diarrhea, constipation, changes in urinary habits. Never intubated for DT.     In the ED, VS were T(F): 97.6 (Max: 98.6), HR: 71 (71 - 102), BP: 135/77 (135/77 - 144/89), RR: 19 (18 - 19), SpO2: 100% on RA. EKG NSR HR 72. Labs significant for AST 50, ALT 59, Lipase 84, Troponin negative, Alcohol Level 152. Received Banana Bag started, Librium 25mg oral x 1. Patient admitted for ETOH abuse and concern for withdrawal. (31 Jan 2019 01:23)      PAST MEDICAL & SURGICAL HISTORY:  Hypertension  Alcohol abuse  S/P wrist surgery      MEDICATIONS  (STANDING):  amLODIPine   Tablet 2.5 milliGRAM(s) Oral daily  chlordiazePOXIDE 25 milliGRAM(s) Oral every 8 hours  folic acid 1 milliGRAM(s) Oral daily  loratadine 10 milliGRAM(s) Oral daily  multivitamin  Chewable 1 Tablet(s) Oral daily  thiamine 100 milliGRAM(s) Oral daily    MEDICATIONS  (PRN):  LORazepam   Injectable 1 milliGRAM(s) IV Push every 2 hours PRN Agitation      Social History: single, lives alone in a 2 nd floor walkup apartment, no home care services, works for [employer], drinks 1/2 L of rum/ day    Functional Level Prior to Admission: ADL independent, walks without assistive devices    FAMILY HISTORY:  No family history of cardiovascular disease (Father)  Family history of osteoporosis (Mother)  Family history of Parkinson disease (Mother)      CBC Full  -  ( 31 Jan 2019 07:20 )  WBC Count : 6.7 K/uL  Hemoglobin : 12.7 g/dL  Hematocrit : 38.5 %  Platelet Count - Automated : 196 K/uL  Mean Cell Volume : 99.2 fL  Mean Cell Hemoglobin : 32.7 pg  Mean Cell Hemoglobin Concentration : 33.0 g/dL  Auto Neutrophil # : x  Auto Lymphocyte # : x  Auto Monocyte # : x  Auto Eosinophil # : x  Auto Basophil # : x  Auto Neutrophil % : x  Auto Lymphocyte % : x  Auto Monocyte % : x  Auto Eosinophil % : x  Auto Basophil % : x      01-31    141  |  105  |  10  ----------------------------<  81  3.9   |  24  |  0.57    Ca    9.0      31 Jan 2019 07:20  Mg     1.8     01-31    TPro  6.7  /  Alb  4.1  /  TBili  0.6  /  DBili  x   /  AST  38  /  ALT  47<H>  /  AlkPhos  42  01-31            Radiology:              Vital Signs Last 24 Hrs  T(C): 37.3 (31 Jan 2019 05:51), Max: 37.3 (31 Jan 2019 05:51)  T(F): 99.2 (31 Jan 2019 05:51), Max: 99.2 (31 Jan 2019 05:51)  HR: 106 (31 Jan 2019 05:51) (71 - 106)  BP: 135/84 (31 Jan 2019 05:51) (119/78 - 144/89)  BP(mean): --  RR: 18 (31 Jan 2019 05:51) (18 - 19)  SpO2: 98% (31 Jan 2019 05:51) (97% - 100%)    REVIEW OF SYSTEMS:    CONSTITUTIONAL: No fever, weight loss, or fatigue  EYES: No eye pain, visual disturbances, or discharge  ENMT:  No difficulty hearing, tinnitus, vertigo; No sinus or throat pain  NECK: No pain or stiffness  BREASTS: No pain, masses, or nipple discharge  RESPIRATORY: No cough, wheezing, chills or hemoptysis; No shortness of breath  CARDIOVASCULAR: No chest pain, palpitations, dizziness, or leg swelling  GASTROINTESTINAL: No abdominal or epigastric pain. No nausea, vomiting, or hematemesis; No diarrhea or constipation. No melena or hematochezia.  GENITOURINARY: No dysuria, frequency, hematuria, or incontinence  NEUROLOGICAL: No headaches, memory loss, loss of strength, numbness, or tremors  SKIN: No itching, burning, rashes, or lesions   LYMPH NODES: No enlarged glands  ENDOCRINE: No heat or cold intolerance; No hair loss  MUSCULOSKELETAL: No joint pain or swelling; No muscle, back, or extremity pain  PSYCHIATRIC: No depression, anxiety, mood swings, or difficulty sleeping  HEME/LYMPH: No easy bruising, or bleeding gums  ALLERGY AND IMMUNOLOGIC: No hives or eczema  VASCULAR: no swelling, erythema      Physical Exam:    Head: normocephalic, atraumatic    Eyes: PERRLA, EOMI, no nystagmus, sclera anicteric    ENT: nasal discharge, uvula midline, no oropharyngeal erythema/exudate    Neck: supple, negative JVD, negative carotid bruits, no thyromegaly    Chest: CTA bilaterally, neg wheeze, rhonchi, rales, crackles, egophany    Cardiovascular: regular rate and rhythm, neg murmurs/rubs/gallops    Abdomen: soft, non distended, non tender, negative rebound/guarding, normal bowel sounds, neg hepatosplenomegaly    Extremities: WWP, neg cyanosis/clubbing/edema, negative calf tenderness to palpation, negative Alva's sign    :     Neurologic Exam:    Alert and oriented to person, place, date/year, speech fluent w/o dysarthria, recent and remote memory intact, repetition intact, comprehension intact,     Cranial Nerves:     II:                       pupils equal, round and reactive to light, visual fields intact   III/ IV/VI:            extraocular movements intact, neg nystagmus, ptosis  V:                       facial sensation intact, V1-3 normal  VII:                     face symmetric, no droop, normal eye closure and smile  VIII:                    hearing intact to finger rub bilaterally  IX/ X:                 soft palate rise symmetrical  XI:                      head turning, shoulder shrug normal  XII:                     tongue midline    Motor Exam:    Upper Extremities:     RIght:   5/5   /intrinsics               5/5  wrist flexors/extensors               5/5  biceps/triceps               5/5  deltoid               negative drift with mild trmor    Left :     5/5  /intrinsics               5/5  wrist flexors/extensors               5/5 biceps/triceps               5/5 deltoid               negative drift with mild tremor    Lower Extremities:                 Right:     5/5  DF/PF/ evertors/ invertors                5/5  Quad/ hamstrings                5/5  hip flexors/adductors/abductors                negative drift     Left:       5/5  DF/PF/ evertors/ invertors                5/5  Quad/ hamstrings                5/5  hip flexors/adductors/abductors                    negative drift      Sensory:    intact to LT/PP in all UE/LE dermatomes    DTR:            = biceps/     triceps/     brachioradialis                      = patella/   medial hamstring/ankle                      neg clonus                      neg Babinski                      neg Hoffmans    Finger to Nose:  wnl with mild tremor    Heel to Shin:  wnl    Rapid Alternating movements:  wnl    Joint Position Sense:  intact    Romberg:  not tested    Tandem Walking:  not tested    Gait:  not tested        PM&R Impression:    1) ETOH withdrawal  2) no focal neuro deficits        Recommendations:    1) Physical therapy focusing on therapeutic exercises, bed mobility/transfer out of bed evaluation, progressive ambulation with assistive devices prn, stair negotiation    2) Anticipated Disposition Plan/Recs: d/c home

## 2019-01-31 NOTE — ED PROVIDER NOTE - OBJECTIVE STATEMENT
43 yof pw withdrawal symptoms.  states for the month of jan, has been having a lot of alcohol binging, and has noted palpitation after hours of not using EtOH.  last drink was tonight as pt tried to calm herself down.  last use prior to that was last >24 hours ago.  no cp, no sob, no nv.

## 2019-01-31 NOTE — H&P ADULT - PROBLEM SELECTOR PLAN 2
Patient with history of HTN, on Amlodipine 2. 5 mg daily; BPs stable  -c/w home medication. Patient with history of HTN, on Amlodipine 2. 5 mg daily. Normotensive.   - f/u AM EKG  - Continue with medication

## 2019-01-31 NOTE — H&P ADULT - PROBLEM SELECTOR PLAN 7
1) PCP Contacted on Admission: (Y/N) --> Name & Phone #: German Martinez MD ([address] [place] [address], [place] [address]); Ph: [phone]  2) Date of Contact with PCP:  3) PCP Contacted at Discharge: (Y/N, N/A)  4) Summary of Handoff Given to PCP:   5) Post-Discharge Appointment Date and Location: 1) PCP Contacted on Admission: (N) --> Name & Phone #: None  2) Date of Contact with PCP:  3) PCP Contacted at Discharge: (Y/N, N/A)  4) Summary of Handoff Given to PCP:   5) Post-Discharge Appointment Date and Location:

## 2019-01-31 NOTE — H&P ADULT - PROBLEM SELECTOR PLAN 6
DVT PPX: IMPROVE  ETHICS: Full Code  DISPOSITION: [facility] DVT PPX: IMPROVE 0, no ppx  ETHICS: Full Code  DISPOSITION: [facility]

## 2019-01-31 NOTE — H&P ADULT - NSHPOUTPATIENTPROVIDERS_GEN_ALL_CORE
PCP: No PCP  German Martinez MD ([address] [place] [address]); Ph: [phone]  Pharmacy: PCP: No PCP  Cardiology: German Martinez MD ([address] [place], [place] [id number]); Ph: [phone]  Pharmacy: [address]

## 2019-01-31 NOTE — H&P ADULT - HISTORY OF PRESENT ILLNESS
44 y/o F PMHX EtOH, HTN presenting to [facility] complaints of anxiety and palpitations since early AM 1/14. Patient reports seeing her Cardiologist, Dr Martinez, this morning around 9 am for reported palpitations that she has been experiencing since last Wednesday, 1/9. Patient notes starting Monday, 1/7 she began reporting flu-like symptoms with reported diarrhea/vomiting (approx 5 episodes, since resolved). Patient was still feeling ill on Wednesday and decided to take 1 Tamiflu, after which she reports experiencing palpitations since. Since then, patient has noted poor PO intake as well. Patient was advised to proceed to the ED for further evaluation from her cardiologist's office. Patient also notes a long-standing history of drinking, where she reports drinking 1/2-1/3 L rum per day for the past 8 years. Patient reports that her job is high-stress which reports was the inciting factor to her drinking. Denies any history of DTs, withdrawal seizures, or admissions for EtOH withdrawal. Last drink: 5 am on 1/14.     43 yof pw withdrawal symptoms.  states for the month of jan, has been having a lot of alcohol binging, and has noted palpitation after hours of not using EtOH.  last drink was tonight as pt tried to calm herself down.  last use prior to that was last >24 hours ago.  no cp, no sob, no nv.    ROS today notable for mild diaphoresis, tremors and feeling unsteady on her feet. Otherwise, patient denies any fevers, chills, NS, chest pain, shortness of breath, cough, nausea/vomiting, abdominal pain, diarrhea, constipation, changes in urinary habits. Remainder of ROS negative.     In the ED, VS were T(F): 97.6 (Max: 98.6), HR: 71 (71 - 102), BP: 135/77 (135/77 - 144/89), RR: 19 (18 - 19), SpO2: 100% on RA.   Labs significant for AST 50, ALT 59, Lipase 84, Troponin negative, Alcohol Level 152.   Received Banana Bag started, Librium 25mg oral x 1.   Patient admitted for A 44 y/o Female with PMH of EtOH abuse, HTN presents to [facility] with concerns of alcohol withdrawal, anxiety, diaphoresis, and palpitations. Went to Urgent care after her last drink this evening. Began experiencing tremors and palpitations. Patient was advised to proceed to the ED for further evaluation given history of alcohol abuse. Patient also notes a long-standing history of drinking, where she reports drinking 1/2-1/3 L rum per day for the past 8 years. Patient reports that her job is high-stress which reports was the inciting factor to her drinking. Denies any history of DTs, withdrawal seizures, or admissions for EtOH withdrawal. States she wants to be placed in a detoxification program. Denies any fevers, chills, headaches, chest pain, shortness of breath, cough, nausea/vomiting, abdominal pain, diarrhea, constipation, changes in urinary habits. Never intubated for DT.     In the ED, VS were T(F): 97.6 (Max: 98.6), HR: 71 (71 - 102), BP: 135/77 (135/77 - 144/89), RR: 19 (18 - 19), SpO2: 100% on RA. EKG NSR HR 72. Labs significant for AST 50, ALT 59, Lipase 84, Troponin negative, Alcohol Level 152. Received Banana Bag started, Librium 25mg oral x 1. Patient admitted for ETOH abuse and concern for withdrawal.

## 2019-01-31 NOTE — H&P ADULT - NSHPPHYSICALEXAM_GEN_ALL_CORE
Vital Signs Last 12 Hrs  T(F): 97.6 (01-31-19 @ 01:06), Max: 98.6 (01-30-19 @ 23:29)  HR: 71 (01-31-19 @ 01:06) (71 - 102)  BP: 135/77 (01-31-19 @ 01:06) (135/77 - 144/89)  BP(mean): --  RR: 19 (01-31-19 @ 01:06) (18 - 19)  SpO2: 100% (01-31-19 @ 01:06) (100% - 100%)  I&O's Summary      PHYSICAL EXAM:  	Constitutional: WDWN  female, resting comfortably in bed; NAD, does not appear anxious  	Head: NC/AT  	Eyes: PERRL, EOMI, anicteric sclera  	ENT: no nasal discharge; uvula midline, no oropharyngeal erythema or exudates; MMM  	Neck: supple; no JVD or thyromegaly  	Respiratory: CTA B/L; no W/R/R, no retractions  	Cardiac: +S1/S2; RRR; no M/R/G; PMI non-displaced  	Gastrointestinal: abdomen soft, NT/ND; no rebound or guarding; +BSx4  	Genitourinary: normal external genitalia  	Back: spine midline, no bony tenderness or step-offs; no CVAT B/L  	Extremities: WWP, no clubbing or cyanosis; no peripheral edema  	Musculoskeletal: NROM x4; no joint swelling, tenderness or erythema; 5/5 strength throughout  	Vascular: 2+ radial, femoral, DP/PT pulses B/L  	Dermatologic: skin warm, dry and intact; no rashes, wounds, or scars  	Lymphatic: no submandibular or cervical LAD  	Neurologic: AAOx3; CNII-XII grossly intact; no focal deficits; mild tremor with extension of UE b/l; intact heel-to-shin exam; no dysmetria  Psychiatric: affect and characteristics of appearance, verbalizations, behaviors are appropriate Vital Signs Last 12 Hrs  T(F): 97.6 (01-31-19 @ 01:06), Max: 98.6 (01-30-19 @ 23:29)  HR: 71 (01-31-19 @ 01:06) (71 - 102)  BP: 135/77 (01-31-19 @ 01:06) (135/77 - 144/89)  BP(mean): --  RR: 19 (01-31-19 @ 01:06) (18 - 19)  SpO2: 100% (01-31-19 @ 01:06) (100% - 100%)  I&O's Summary      PHYSICAL EXAM:  Constitutional: WDWN  female, resting comfortably in bed; NAD, anxious  Head: NC/AT  Eyes: PERRL, EOMI, anicteric sclera  ENT: no nasal discharge; uvula midline, no oropharyngeal erythema or exudates; MMM  Neck: supple; no JVD or thyromegaly  Respiratory: CTA B/L; no W/R/R, no retractions  Cardiac: +S1/S2; RRR; no M/R/G; PMI non-displaced  Gastrointestinal: abdomen soft, NT/ND; no rebound or guarding; +BSx4  Genitourinary: normal external genitalia  Back: spine midline, no bony tenderness or step-offs; no CVAT B/L  Extremities: WWP, no clubbing or cyanosis; no peripheral edema  Musculoskeletal: NROM x4; no joint swelling, tenderness or erythema; 5/5 strength throughout  Vascular: 2+ radial, femoral, DP/PT pulses B/L  Dermatologic: skin warm, dry and intact; no rashes, wounds, or scars  Lymphatic: no submandibular or cervical LAD  Neurologic: AAOx3; CNII-XII grossly intact; no focal deficits; mild tremor with extension of UE b/l; tongue fasciculations present  Psychiatric: affect and characteristics of appearance, verbalizations, behaviors are appropriate

## 2019-02-01 ENCOUNTER — TRANSCRIPTION ENCOUNTER (OUTPATIENT)
Age: 44
End: 2019-02-01

## 2019-02-01 LAB
ANION GAP SERPL CALC-SCNC: 10 MMOL/L — SIGNIFICANT CHANGE UP (ref 5–17)
BUN SERPL-MCNC: 10 MG/DL — SIGNIFICANT CHANGE UP (ref 7–23)
CALCIUM SERPL-MCNC: 8.8 MG/DL — SIGNIFICANT CHANGE UP (ref 8.4–10.5)
CHLORIDE SERPL-SCNC: 105 MMOL/L — SIGNIFICANT CHANGE UP (ref 96–108)
CO2 SERPL-SCNC: 26 MMOL/L — SIGNIFICANT CHANGE UP (ref 22–31)
CREAT SERPL-MCNC: 0.64 MG/DL — SIGNIFICANT CHANGE UP (ref 0.5–1.3)
GLUCOSE SERPL-MCNC: 122 MG/DL — HIGH (ref 70–99)
HCT VFR BLD CALC: 36.4 % — SIGNIFICANT CHANGE UP (ref 34.5–45)
HGB BLD-MCNC: 12 G/DL — SIGNIFICANT CHANGE UP (ref 11.5–15.5)
MAGNESIUM SERPL-MCNC: 1.9 MG/DL — SIGNIFICANT CHANGE UP (ref 1.6–2.6)
MCHC RBC-ENTMCNC: 33 G/DL — SIGNIFICANT CHANGE UP (ref 32–36)
MCHC RBC-ENTMCNC: 33.6 PG — SIGNIFICANT CHANGE UP (ref 27–34)
MCV RBC AUTO: 102 FL — HIGH (ref 80–100)
PLATELET # BLD AUTO: 197 K/UL — SIGNIFICANT CHANGE UP (ref 150–400)
POTASSIUM SERPL-MCNC: 3.5 MMOL/L — SIGNIFICANT CHANGE UP (ref 3.5–5.3)
POTASSIUM SERPL-SCNC: 3.5 MMOL/L — SIGNIFICANT CHANGE UP (ref 3.5–5.3)
RBC # BLD: 3.57 M/UL — LOW (ref 3.8–5.2)
RBC # FLD: 12.9 % — SIGNIFICANT CHANGE UP (ref 10.3–16.9)
SODIUM SERPL-SCNC: 141 MMOL/L — SIGNIFICANT CHANGE UP (ref 135–145)
WBC # BLD: 4.8 K/UL — SIGNIFICANT CHANGE UP (ref 3.8–10.5)
WBC # FLD AUTO: 4.8 K/UL — SIGNIFICANT CHANGE UP (ref 3.8–10.5)

## 2019-02-01 RX ORDER — POTASSIUM CHLORIDE 20 MEQ
20 PACKET (EA) ORAL
Qty: 0 | Refills: 0 | Status: COMPLETED | OUTPATIENT
Start: 2019-02-01 | End: 2019-02-01

## 2019-02-01 RX ADMIN — Medication 20 MILLIEQUIVALENT(S): at 15:10

## 2019-02-01 RX ADMIN — Medication 25 MILLIGRAM(S): at 17:24

## 2019-02-01 RX ADMIN — Medication 100 MILLIGRAM(S): at 09:02

## 2019-02-01 RX ADMIN — AMLODIPINE BESYLATE 2.5 MILLIGRAM(S): 2.5 TABLET ORAL at 05:39

## 2019-02-01 RX ADMIN — LORATADINE 10 MILLIGRAM(S): 10 TABLET ORAL at 09:03

## 2019-02-01 RX ADMIN — Medication 1 MILLIGRAM(S): at 13:12

## 2019-02-01 RX ADMIN — Medication 1 MILLIGRAM(S): at 09:03

## 2019-02-01 RX ADMIN — Medication 20 MILLIEQUIVALENT(S): at 17:24

## 2019-02-01 RX ADMIN — Medication 25 MILLIGRAM(S): at 05:39

## 2019-02-01 RX ADMIN — Medication 1 TABLET(S): at 09:02

## 2019-02-01 NOTE — DISCHARGE NOTE ADULT - CARE PLAN
Principal Discharge DX:	Alcohol withdrawal  Assessment and plan of treatment:	You came to the hospital because of alcohol withdrawal. We started you on medications to prevent withdrawal (Librium). When we assessed you and saw that you were no longer withdrawing and gradually tapered your librium. Social workers also discussed options for detox facilities with you. When you leave the hospital, please follow up with Dr. Blackwood and please call [phone] to make an appointment. Principal Discharge DX:	Alcohol withdrawal  Goal:	to go to your therapy session and enroll in detox program  Assessment and plan of treatment:	You came to the hospital because of alcohol withdrawal. We started you on medications to prevent withdrawal (Librium). When we assessed you and saw that you were no longer withdrawing and gradually tapered your librium. Social workers also discussed options for detox facilities with you. When you leave the hospital, please follow up with Dr. Blackwood and please call [phone] to make an appointment.

## 2019-02-01 NOTE — PROGRESS NOTE ADULT - PROBLEM SELECTOR PLAN 2
Patient with history of HTN, on Amlodipine 2. 5 mg daily. Normotensive.   - Continue with medication

## 2019-02-01 NOTE — DISCHARGE NOTE ADULT - HOSPITAL COURSE
42 y/o Female with PMH of EtOH abuse, HTN presented to [facility] with concerns of alcohol withdrawal, anxiety, diaphoresis, and palpitations. Went to Urgent care after her last drink on 1/30. Began experiencing tremors and palpitations. Patient was advised to proceed to the ED for further evaluation given history of alcohol abuse. Patient also noted a long-standing history of drinking, where she reports drinking 1/2-1/3 L rum per day for the past 8 years. Patient reports that her job is high-stress which reports was the inciting factor to her drinking. Denied any history of DTs, withdrawal seizures, or admissions for EtOH withdrawal. Stated she wants to be placed in a detoxification program. Denied any fevers, chills, headaches, chest pain, shortness of breath, cough, nausea/vomiting, abdominal pain, diarrhea, constipation, changes in urinary habits. Never intubated for DT.     In the ED, VS were T(F): 97.6 (Max: 98.6), HR: 71 (71 - 102), BP: 135/77 (135/77 - 144/89), RR: 19 (18 - 19), SpO2: 100% on RA. EKG NSR HR 72. Labs significant for AST 50, ALT 59, Lipase 84, Troponin negative, Alcohol Level 152. Received Banana Bag started, Librium 25mg oral x 1. Patient admitted for ETOH abuse and concern for withdrawal. Patient was started on 25 mg ativan q8 which was tapered to q12 and then stopped. Her Ciwa scores were consistently 4 or lower. She will be discharged home with instructions on detox facilities. She is hemodynamically stable.

## 2019-02-01 NOTE — DISCHARGE NOTE ADULT - PATIENT PORTAL LINK FT
You can access the [facility] Patient Portal, offered by [facility], by registering with the following website: [email or web address]

## 2019-02-01 NOTE — PROGRESS NOTE ADULT - PROBLEM SELECTOR PLAN 1
Patient presenting to [facility] ED with complaints of anxiety, palpitations, diaphoresis, long-standing history of EtOH abuse (drinks 1/2 bottle gin/day for the last 8 years), no prior history of withdrawal seizures, DTs or admissions for EtOH withdrawal in the past  - EtOH level on admission 152; last drink on evening of presentation (1/30)   - s/p Librium 25 mg PO x1, Ativan 0.5 mg IVP x1, s/p Banana Bag in ED  - CIWA 5 for mild diaphoresis, anxiety, and tremor on admission. Over the past 24 hours, CIWA has been 0.   - CIWA Protocol, monitor q4h  - Decrease to Librium 25 mg q12  from 25 q8   - Ativan 1 mg IVP q2h PRN for CIWA >8  - Fall Precautions   - MV/Folic Acid/Thiamine  - Physical Therapy

## 2019-02-01 NOTE — PROGRESS NOTE ADULT - PROBLEM SELECTOR PLAN 3
History as above, drinks 1/3 to 1/2 L of rum per day for the past 8 year  - Wants to be placed in a detoxification program.  - Social Work Consult  - Case Management

## 2019-02-01 NOTE — PROGRESS NOTE ADULT - PROBLEM SELECTOR PLAN 7
1) PCP Contacted on Admission: (N) --> Name & Phone #: None  2) Date of Contact with PCP:  3) PCP Contacted at Discharge: (Y/N, N/A)  4) Summary of Handoff Given to PCP:   5) Post-Discharge Appointment Date and Location:

## 2019-02-01 NOTE — DISCHARGE NOTE ADULT - PLAN OF CARE
You came to the hospital because of alcohol withdrawal. We started you on medications to prevent withdrawal (Librium). When we assessed you and saw that you were no longer withdrawing and gradually tapered your librium. Social workers also discussed options for detox facilities with you. When you leave the hospital, please follow up with Dr. Blackwood and please call [phone] to make an appointment. to go to your therapy session and enroll in detox program

## 2019-02-01 NOTE — DISCHARGE NOTE ADULT - ADDITIONAL INSTRUCTIONS
Please follow up with Dr. Blackwood and please call [phone] to make an appointment.  Kishan Blackwood)  Electrodiagnostic Medicine; Neurology  [address] [place] [address]  Phone: [phone]  Fax: [phone] Please follow up with Dr. Blackwood and please call [phone] to make an appointment.  Kishan Blackwood)  Electrodiagnostic Medicine; Neurology  [address] [address]  Phone: [phone]  Fax: [phone]    Please attend your therapy session with your therapist tomorrow as scheduled. Information was provided to you regarding detox programs. Please contact those centers and enroll in a program.

## 2019-02-01 NOTE — PROGRESS NOTE ADULT - SUBJECTIVE AND OBJECTIVE BOX
INTERVAL HPI/OVERNIGHT EVENTS:  O/N patient slept well and her CIWA was zero. This AM, patient was napping comfortably in her bed and denied n/v/d, palpitations, anxiety, headache, dizziness. ROS otherwise negative.     ICU Vital Signs Last 24 Hrs  T(C): 36.8 (01 Feb 2019 09:04), Max: 36.8 (31 Jan 2019 20:47)  T(F): 98.3 (01 Feb 2019 09:04), Max: 98.3 (01 Feb 2019 09:04)  HR: 90 (01 Feb 2019 09:04) (79 - 90)  BP: 124/79 (01 Feb 2019 09:04) (120/76 - 124/79)  RR: 18 (01 Feb 2019 09:04) (17 - 18)  SpO2: 99% (01 Feb 2019 09:04) (97% - 99%)      PHYSICAL EXAM:    Constitutional: NAD, resting comfortably   HEENT: Sclera non-icteric, MMM  Respiratory: CTA b/l  Cardiovascular: RRR, normal S1S2, no M/R/G  Gastrointestinal: soft, NTND, no masses palpable, BS normal  Extremities: Warm, well perfused, pulses equal bilateral upper and lower extremities, no edema  Neurological: AAOx3, CN Grossly intact  Skin: Normal temperature, warm, dry    MEDICATIONS  (STANDING):  amLODIPine   Tablet 2.5 milliGRAM(s) Oral daily  chlordiazePOXIDE 25 milliGRAM(s) Oral once  folic acid 1 milliGRAM(s) Oral daily  loratadine 10 milliGRAM(s) Oral daily  multivitamin  Chewable 1 Tablet(s) Oral daily  potassium chloride    Tablet ER 20 milliEquivalent(s) Oral every 2 hours  thiamine 100 milliGRAM(s) Oral daily    MEDICATIONS  (PRN):  LORazepam   Injectable 1 milliGRAM(s) IV Push every 2 hours PRN Agitation      Allergies    predniSONE (Rash)  Tamiflu (Other)    Intolerances        LABS:                        12.0   4.8   )-----------( 197      ( 01 Feb 2019 05:55 )             36.4     02-01    141  |  105  |  10  ----------------------------<  122<H>  3.5   |  26  |  0.64    Ca    8.8      01 Feb 2019 05:55  Mg     1.9     02-01    TPro  6.7  /  Alb  4.1  /  TBili  0.6  /  DBili  x   /  AST  38  /  ALT  47<H>  /  AlkPhos  42  01-31          RADIOLOGY & ADDITIONAL TESTS:  Reviewed

## 2019-02-01 NOTE — DISCHARGE NOTE ADULT - CARE PROVIDER_API CALL
Kishan Blackwood)  Electrodiagnostic Medicine; Neurology  [address] [address]  Phone: [phone]  Fax: [phone] Kishan Blackwood)  Electrodiagnostic Medicine; Neurology  [address] [place] [address]  Phone: [phone]  Fax: [phone]    Kaleb Pizano)  Medicine  [address] [place] [address]  Phone: [phone]  Fax: [phone]

## 2019-02-01 NOTE — PROGRESS NOTE ADULT - ASSESSMENT
A 42 y/o Female with PMH of EtOH abuse, HTN presents to [facility] with concerns of alcohol withdrawal, anxiety, diaphoresis, and palpitations after her last drink this evening, admitted for management of ETOH abuse and concern for withdrawal.

## 2019-02-01 NOTE — DISCHARGE NOTE ADULT - MEDICATION SUMMARY - MEDICATIONS TO TAKE
I will START or STAY ON the medications listed below when I get home from the hospital:    amLODIPine  -- 2.5 milligram(s) by mouth once a day  -- Indication: For Hypertension    Multiple Vitamins oral tablet  -- 1 tab(s) by mouth once a day  -- Indication: For Nutrition, metabolism, and development symptoms    folic acid 1 mg oral tablet  -- 1 tab(s) by mouth once a day  -- Indication: For Nutrition, metabolism, and development symptoms    thiamine 100 mg oral tablet  -- 1 tab(s) by mouth once a day  -- Indication: For Nutrition, metabolism, and development symptoms

## 2019-02-02 VITALS
DIASTOLIC BLOOD PRESSURE: 70 MMHG | SYSTOLIC BLOOD PRESSURE: 106 MMHG | HEART RATE: 74 BPM | OXYGEN SATURATION: 97 % | TEMPERATURE: 98 F | RESPIRATION RATE: 19 BRPM

## 2019-02-02 PROCEDURE — 80307 DRUG TEST PRSMV CHEM ANLYZR: CPT

## 2019-02-02 PROCEDURE — 84702 CHORIONIC GONADOTROPIN TEST: CPT

## 2019-02-02 PROCEDURE — 36415 COLL VENOUS BLD VENIPUNCTURE: CPT

## 2019-02-02 PROCEDURE — 99285 EMERGENCY DEPT VISIT HI MDM: CPT | Mod: 25

## 2019-02-02 PROCEDURE — 93005 ELECTROCARDIOGRAM TRACING: CPT

## 2019-02-02 PROCEDURE — 80048 BASIC METABOLIC PNL TOTAL CA: CPT

## 2019-02-02 PROCEDURE — 82553 CREATINE MB FRACTION: CPT

## 2019-02-02 PROCEDURE — 80053 COMPREHEN METABOLIC PANEL: CPT

## 2019-02-02 PROCEDURE — 84484 ASSAY OF TROPONIN QUANT: CPT

## 2019-02-02 PROCEDURE — 83735 ASSAY OF MAGNESIUM: CPT

## 2019-02-02 PROCEDURE — 82550 ASSAY OF CK (CPK): CPT

## 2019-02-02 PROCEDURE — 85025 COMPLETE CBC W/AUTO DIFF WBC: CPT

## 2019-02-02 PROCEDURE — 83690 ASSAY OF LIPASE: CPT

## 2019-02-02 PROCEDURE — 85027 COMPLETE CBC AUTOMATED: CPT

## 2019-02-02 RX ADMIN — Medication 1 TABLET(S): at 11:55

## 2019-02-02 RX ADMIN — Medication 100 MILLIGRAM(S): at 11:54

## 2019-02-02 RX ADMIN — AMLODIPINE BESYLATE 2.5 MILLIGRAM(S): 2.5 TABLET ORAL at 05:34

## 2019-02-02 RX ADMIN — LORATADINE 10 MILLIGRAM(S): 10 TABLET ORAL at 11:54

## 2019-02-02 RX ADMIN — Medication 1 MILLIGRAM(S): at 11:55

## 2019-02-05 DIAGNOSIS — R74.0 NONSPECIFIC ELEVATION OF LEVELS OF TRANSAMINASE AND LACTIC ACID DEHYDROGENASE [LDH]: ICD-10-CM

## 2019-02-05 DIAGNOSIS — F10.239 ALCOHOL DEPENDENCE WITH WITHDRAWAL, UNSPECIFIED: ICD-10-CM

## 2019-02-05 DIAGNOSIS — Z79.899 OTHER LONG TERM (CURRENT) DRUG THERAPY: ICD-10-CM

## 2019-02-05 DIAGNOSIS — I10 ESSENTIAL (PRIMARY) HYPERTENSION: ICD-10-CM

## 2019-06-15 ENCOUNTER — INPATIENT (INPATIENT)
Facility: HOSPITAL | Age: 44
LOS: 2 days | Discharge: HOME | DRG: 897 | End: 2019-06-18
Attending: INTERNAL MEDICINE | Admitting: INTERNAL MEDICINE
Payer: MEDICAID

## 2019-06-15 VITALS
OXYGEN SATURATION: 98 % | HEART RATE: 123 BPM | DIASTOLIC BLOOD PRESSURE: 98 MMHG | SYSTOLIC BLOOD PRESSURE: 153 MMHG | TEMPERATURE: 99 F | RESPIRATION RATE: 16 BRPM

## 2019-06-15 DIAGNOSIS — Z98.890 OTHER SPECIFIED POSTPROCEDURAL STATES: Chronic | ICD-10-CM

## 2019-06-15 DIAGNOSIS — Z91.89 OTHER SPECIFIED PERSONAL RISK FACTORS, NOT ELSEWHERE CLASSIFIED: ICD-10-CM

## 2019-06-15 DIAGNOSIS — R63.8 OTHER SYMPTOMS AND SIGNS CONCERNING FOOD AND FLUID INTAKE: ICD-10-CM

## 2019-06-15 DIAGNOSIS — F10.239 ALCOHOL DEPENDENCE WITH WITHDRAWAL, UNSPECIFIED: ICD-10-CM

## 2019-06-15 DIAGNOSIS — F10.10 ALCOHOL ABUSE, UNCOMPLICATED: ICD-10-CM

## 2019-06-15 DIAGNOSIS — Z29.9 ENCOUNTER FOR PROPHYLACTIC MEASURES, UNSPECIFIED: ICD-10-CM

## 2019-06-15 DIAGNOSIS — I10 ESSENTIAL (PRIMARY) HYPERTENSION: ICD-10-CM

## 2019-06-15 LAB
ALBUMIN SERPL ELPH-MCNC: 4.8 G/DL — SIGNIFICANT CHANGE UP (ref 3.3–5)
ALP SERPL-CCNC: 66 U/L — SIGNIFICANT CHANGE UP (ref 40–120)
ALT FLD-CCNC: 80 U/L — HIGH (ref 10–45)
ANION GAP SERPL CALC-SCNC: 20 MMOL/L — HIGH (ref 5–17)
AST SERPL-CCNC: 119 U/L — HIGH (ref 10–40)
BASOPHILS # BLD AUTO: 0.07 K/UL — SIGNIFICANT CHANGE UP (ref 0–0.2)
BASOPHILS NFR BLD AUTO: 1.7 % — SIGNIFICANT CHANGE UP (ref 0–2)
BILIRUB SERPL-MCNC: 1.1 MG/DL — SIGNIFICANT CHANGE UP (ref 0.2–1.2)
BUN SERPL-MCNC: 4 MG/DL — LOW (ref 7–23)
CALCIUM SERPL-MCNC: 9.4 MG/DL — SIGNIFICANT CHANGE UP (ref 8.4–10.5)
CHLORIDE SERPL-SCNC: 94 MMOL/L — LOW (ref 96–108)
CO2 SERPL-SCNC: 24 MMOL/L — SIGNIFICANT CHANGE UP (ref 22–31)
CREAT SERPL-MCNC: 0.55 MG/DL — SIGNIFICANT CHANGE UP (ref 0.5–1.3)
EOSINOPHIL # BLD AUTO: 0 K/UL — SIGNIFICANT CHANGE UP (ref 0–0.5)
EOSINOPHIL NFR BLD AUTO: 0 % — SIGNIFICANT CHANGE UP (ref 0–6)
ETHANOL SERPL-MCNC: 183 MG/DL — HIGH (ref 0–10)
GLUCOSE SERPL-MCNC: 111 MG/DL — HIGH (ref 70–99)
HCT VFR BLD CALC: 41.9 % — SIGNIFICANT CHANGE UP (ref 34.5–45)
HGB BLD-MCNC: 14.4 G/DL — SIGNIFICANT CHANGE UP (ref 11.5–15.5)
LYMPHOCYTES # BLD AUTO: 0.49 K/UL — LOW (ref 1–3.3)
LYMPHOCYTES # BLD AUTO: 11.4 % — LOW (ref 13–44)
MCHC RBC-ENTMCNC: 32.1 PG — SIGNIFICANT CHANGE UP (ref 27–34)
MCHC RBC-ENTMCNC: 34.4 GM/DL — SIGNIFICANT CHANGE UP (ref 32–36)
MCV RBC AUTO: 93.5 FL — SIGNIFICANT CHANGE UP (ref 80–100)
MONOCYTES # BLD AUTO: 0.56 K/UL — SIGNIFICANT CHANGE UP (ref 0–0.9)
MONOCYTES NFR BLD AUTO: 13.2 % — SIGNIFICANT CHANGE UP (ref 2–14)
NEUTROPHILS # BLD AUTO: 3.12 K/UL — SIGNIFICANT CHANGE UP (ref 1.8–7.4)
NEUTROPHILS NFR BLD AUTO: 72.8 % — SIGNIFICANT CHANGE UP (ref 43–77)
PLATELET # BLD AUTO: 174 K/UL — SIGNIFICANT CHANGE UP (ref 150–400)
POTASSIUM SERPL-MCNC: 4 MMOL/L — SIGNIFICANT CHANGE UP (ref 3.5–5.3)
POTASSIUM SERPL-SCNC: 4 MMOL/L — SIGNIFICANT CHANGE UP (ref 3.5–5.3)
PROT SERPL-MCNC: 8 G/DL — SIGNIFICANT CHANGE UP (ref 6–8.3)
RBC # BLD: 4.48 M/UL — SIGNIFICANT CHANGE UP (ref 3.8–5.2)
RBC # FLD: 14.1 % — SIGNIFICANT CHANGE UP (ref 10.3–14.5)
SODIUM SERPL-SCNC: 138 MMOL/L — SIGNIFICANT CHANGE UP (ref 135–145)
WBC # BLD: 4.28 K/UL — SIGNIFICANT CHANGE UP (ref 3.8–10.5)
WBC # FLD AUTO: 4.28 K/UL — SIGNIFICANT CHANGE UP (ref 3.8–10.5)

## 2019-06-15 PROCEDURE — 93010 ELECTROCARDIOGRAM REPORT: CPT

## 2019-06-15 PROCEDURE — 99285 EMERGENCY DEPT VISIT HI MDM: CPT | Mod: 25

## 2019-06-15 RX ORDER — ONDANSETRON 8 MG/1
4 TABLET, FILM COATED ORAL ONCE
Refills: 0 | Status: COMPLETED | OUTPATIENT
Start: 2019-06-15 | End: 2019-06-15

## 2019-06-15 RX ORDER — SODIUM CHLORIDE 9 MG/ML
1000 INJECTION, SOLUTION INTRAVENOUS ONCE
Refills: 0 | Status: COMPLETED | OUTPATIENT
Start: 2019-06-15 | End: 2019-06-15

## 2019-06-15 RX ORDER — FOLIC ACID 0.8 MG
1 TABLET ORAL DAILY
Refills: 0 | Status: DISCONTINUED | OUTPATIENT
Start: 2019-06-15 | End: 2019-06-18

## 2019-06-15 RX ORDER — THIAMINE MONONITRATE (VIT B1) 100 MG
100 TABLET ORAL DAILY
Refills: 0 | Status: DISCONTINUED | OUTPATIENT
Start: 2019-06-16 | End: 2019-06-18

## 2019-06-15 RX ORDER — AMLODIPINE BESYLATE 2.5 MG/1
2.5 TABLET ORAL DAILY
Refills: 0 | Status: DISCONTINUED | OUTPATIENT
Start: 2019-06-15 | End: 2019-06-18

## 2019-06-15 RX ORDER — SODIUM CHLORIDE 9 MG/ML
1000 INJECTION INTRAMUSCULAR; INTRAVENOUS; SUBCUTANEOUS
Refills: 0 | Status: DISCONTINUED | OUTPATIENT
Start: 2019-06-15 | End: 2019-06-18

## 2019-06-15 RX ORDER — SODIUM CHLORIDE 9 MG/ML
1000 INJECTION INTRAMUSCULAR; INTRAVENOUS; SUBCUTANEOUS ONCE
Refills: 0 | Status: COMPLETED | OUTPATIENT
Start: 2019-06-15 | End: 2019-06-15

## 2019-06-15 RX ORDER — THIAMINE MONONITRATE (VIT B1) 100 MG
100 TABLET ORAL ONCE
Refills: 0 | Status: COMPLETED | OUTPATIENT
Start: 2019-06-15 | End: 2019-06-15

## 2019-06-15 RX ADMIN — Medication 1 MILLIGRAM(S): at 12:12

## 2019-06-15 RX ADMIN — SODIUM CHLORIDE 100 MILLILITER(S): 9 INJECTION INTRAMUSCULAR; INTRAVENOUS; SUBCUTANEOUS at 15:43

## 2019-06-15 RX ADMIN — SODIUM CHLORIDE 2000 MILLILITER(S): 9 INJECTION INTRAMUSCULAR; INTRAVENOUS; SUBCUTANEOUS at 10:54

## 2019-06-15 RX ADMIN — Medication 25 MILLIGRAM(S): at 19:30

## 2019-06-15 RX ADMIN — Medication 1 MILLIGRAM(S): at 10:52

## 2019-06-15 RX ADMIN — Medication 100 MILLIGRAM(S): at 14:44

## 2019-06-15 RX ADMIN — ONDANSETRON 4 MILLIGRAM(S): 8 TABLET, FILM COATED ORAL at 11:14

## 2019-06-15 RX ADMIN — Medication 25 MILLIGRAM(S): at 12:11

## 2019-06-15 RX ADMIN — Medication 1 MILLIGRAM(S): at 11:01

## 2019-06-15 RX ADMIN — Medication 1 MILLIGRAM(S): at 13:18

## 2019-06-15 RX ADMIN — SODIUM CHLORIDE 2000 MILLILITER(S): 9 INJECTION, SOLUTION INTRAVENOUS at 12:16

## 2019-06-15 NOTE — H&P ADULT - HISTORY OF PRESENT ILLNESS
This is a 44 y/o Female with PMH of EtOH abuse, HTN presents to [facility] with concerns of alcohol withdrawal, anxiety, and palpitations. She states that she was last in the hospital in January for ETOH withdrawal. She quit drinking since then, however in May started etoh again for a few days. She weaned herself off and was taking naltrexone at home. She picked up etoh two weeks ago and has been drinking daily since then. She states she drinks about a pint a night, but cant be clear. Her last drink was this AM with rum. She states her last meal was monday, and she has intermittent nausea. She denies any vomiting, fever, chills, CP, SOB, LE edema, urinary changes. She endorse intermittent HA and palpitations that started the past three days. 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     At [facility] ED: Vitals sig for sinus tach, BP stable, saturating well on room air. Labs sig for elevated LFTs consistent with etoh use and alcohol level 183.. EKG with sinus tach. Pt received ativan 1mg x4, librium 25mg, 1L LR, 1L NS, zofran and thiamine. Pt admitted for monitoring of etoh withdrawal.

## 2019-06-15 NOTE — ED ADULT NURSE NOTE - OBJECTIVE STATEMENT
Pt reports drinking pint of rum last night, "some" this morning and is now having tremors, palpitations. Pt denies chest pain, sob, dizziness, weakness.

## 2019-06-15 NOTE — ED PROVIDER NOTE - CLINICAL SUMMARY MEDICAL DECISION MAKING FREE TEXT BOX
43F pmh etoh abuse, prior withdrawal -no DT/Seizrues, HTN, p/w palpitations for past few days after trying to taper off alcohol. Last drink reported this am "small." Also states has used 1 liter rum in past few days. Friend states trash had at least a liter or 2 per day for past week. Last admitted in Jan here for similar. Denies A/V hallucinations. Exam noted for tachycardia, htn, sig tremulousness, flushed. Concern alcohol withdrawal, electrolyte abnormality, dehydration, no delirium or hallucinations to suggest DT at this time. Plan for IV benzo, ivf, labs. 43F pmh etoh abuse, prior withdrawal -no DT/Seizrues, HTN, p/w palpitations for past few days after trying to taper off alcohol. Last drink reported this am "small." Also states has used 1 liter rum in past few days. Friend states trash had at least a liter or 2 per day for past week. Last admitted in Jan here for similar. Denies A/V hallucinations. Exam noted for tachycardia, htn, sig tremulousness, flushed. Concern alcohol withdrawal, electrolyte abnormality, dehydration, no delirium or hallucinations to suggest DT at this time. Plan for IV benzo, ivf, labs. Given multiple rounds iv benzo + po, pt now sleeping, bp 102/74, hr 104. Stable for floor.

## 2019-06-15 NOTE — H&P ADULT - PROBLEM SELECTOR PLAN 3
Pt has been in detox before, wants to go back on naltrexone when she goes home   , ALT 80. Likely 2/2 to alcohol use.   - f/u AM CMP.

## 2019-06-15 NOTE — H&P ADULT - ASSESSMENT
A 42 y/o Female with PMH of EtOH abuse, HTN presents to [facility] with concerns of alcohol withdrawal, , diaphoresis, and palpitations after her last drink this morning, admitted for management of ETOH abuse and concern for withdrawal.

## 2019-06-15 NOTE — H&P ADULT - NSHPSOCIALHISTORY_GEN_ALL_CORE
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

## 2019-06-15 NOTE — ED PROVIDER NOTE - PROGRESS NOTE DETAILS
Pt still tremulous but markedly improved.  while sleeping, but returns to 120s' when awake. Add'l ativan rx'd. Pending discussion w/ pmd.

## 2019-06-15 NOTE — H&P ADULT - NSHPOUTPATIENTPROVIDERS_GEN_ALL_CORE
PCP: No PCP  Cardiology: German Martinez MD ([address] [place], [place] [id number]); Ph: [phone]  Pharmacy: [address]

## 2019-06-15 NOTE — ED PROVIDER NOTE - OBJECTIVE STATEMENT
43F pmh etoh abuse 43F pmh etoh abuse, prior withdrawal -no DT/Seizrues, HTN, p/w palpitations for past few days after trying to taper off alcohol. Last drink reported this am "small." Also states has used 1 liter rum in past few days. Friend states trash had at least a liter or 2 per day for past week. Last admitted in Jan here for similar. Denies A/V hallucinations.

## 2019-06-15 NOTE — H&P ADULT - NSICDXFAMILYHX_GEN_ALL_CORE_FT
FAMILY HISTORY:  Father  Still living? Unknown  No family history of cardiovascular disease, Age at diagnosis: Age Unknown    Mother  Still living? Unknown  Family history of osteoporosis, Age at diagnosis: Age Unknown  Family history of Parkinson disease, Age at diagnosis: Age Unknown

## 2019-06-15 NOTE — ED PROVIDER NOTE - PHYSICAL EXAMINATION
VITAL SIGNS: I have reviewed nursing notes and confirm.  CONSTITUTIONAL: Well-developed; well-nourished; in mod distress.  SKIN: Skin is warm and dry, flushed, no focal rash  HEAD: Normocephalic; atraumatic.  EYES:  EOM intact; conjunctiva and sclera clear.  ENT: No nasal discharge; airway clear.  NECK: Supple; Voluntary FROM  CARD: No rubs appreciated, tachycardic rate and rhythm.  RESP: No wheezes, no rales. No respiratory distress  ABD: Soft; non-distended; non-tender; no rebound or guarding  EXT: Normal ROM. No cyanosis or edema.  NEURO: Alert, oriented. very tremulous,   PSYCH: Cooperative, appropriate.

## 2019-06-15 NOTE — H&P ADULT - PROBLEM SELECTOR PLAN 1
Patient presenting to [facility] ED with complaints of anxiety, palpitations, long-standing history of EtOH abuse , no prior history of withdrawal seizures, DTs  - EtOH level on admission 183; last drink this morning  - s/p Librium 25 mg PO x1, Ativan 1 mg IVP x4  - CIWA 3 for mild anxiety, and tremor during encounter.  - CIWA Protocol, monitor q4h  - Start Librium 25 mg q8h  - Start Ativan 1 mg IVP q2h PRN for CIWA >8  - Fall Precautions   - MV/Folic Acid/Thiamine

## 2019-06-15 NOTE — H&P ADULT - NSHPPHYSICALEXAM_GEN_ALL_CORE
.  VITAL SIGNS:  T(F): 98.7 (06-15-19 @ 15:10), Max: 98.8 (06-15-19 @ 14:26)  HR: 112 (06-15-19 @ 15:10) (102 - 123)  BP: 133/77 (06-15-19 @ 15:10) (122/74 - 159/100)  BP(mean): --  RR: 18 (06-15-19 @ 15:10) (16 - 18)  SpO2: 90% (06-15-19 @ 15:10) (90% - 98%)    PHYSICAL EXAM:    Constitutional: WDWN resting comfortably in bed; NAD  HEENT: NC/AT, PERRL, EOMI, anicteric sclera, no nasal discharge; uvula midline, no oropharyngeal erythema or exudates; MMM  Neck: supple; no JVD or thyromegaly  Respiratory: CTA B/L; no W/R/R, no retractions  Cardiac: +S1/S2; regular, tachycardic, no M/R/G; PMI non-displaced  Gastrointestinal: soft, NT/ND; no rebound or guarding; +BSx4  Back: spine midline, no bony tenderness or step-offs; no CVAT B/L  Extremities: WWP, no clubbing or cyanosis; no peripheral edema  Musculoskeletal: NROM x4; no joint swelling, tenderness or erythema  Vascular: 2+ radial, DP/PT pulses B/L  Dermatologic: skin warm, dry and intact; no rashes, wounds, or scars  Neurologic: AAOx3; CNII-XII grossly intact; no focal deficits  Psychiatric: affect and characteristics of appearance, verbalizations, behaviors are appropriate, denies SI/HI/AH/VH

## 2019-06-15 NOTE — ED ADULT TRIAGE NOTE - CHIEF COMPLAINT QUOTE
pt c/o being in withdrawal from alcohol & feeling like her heart is racing. last drink was this morning.

## 2019-06-15 NOTE — H&P ADULT - NSHPLABSRESULTS_GEN_ALL_CORE
.  LABS:                         14.4   4.28  )-----------( 174      ( 15 Leo 2019 11:05 )             41.9     06-15    138  |  94<L>  |  4<L>  ----------------------------<  111<H>  4.0   |  24  |  0.55    Ca    9.4      15 Leo 2019 11:05    TPro  8.0  /  Alb  4.8  /  TBili  1.1  /  DBili  x   /  AST  119<H>  /  ALT  80<H>  /  AlkPhos  66  06-15                  RADIOLOGY, EKG & ADDITIONAL TESTS: Reviewed.

## 2019-06-15 NOTE — H&P ADULT - PROBLEM SELECTOR PLAN 2
atient with history of HTN, on Amlodipine 2. 5 mg daily. Normotensive.   - f/u AM EKG  - Continue with medication.

## 2019-06-16 LAB
ALBUMIN SERPL ELPH-MCNC: 4.3 G/DL — SIGNIFICANT CHANGE UP (ref 3.3–5)
ALP SERPL-CCNC: 57 U/L — SIGNIFICANT CHANGE UP (ref 40–120)
ALT FLD-CCNC: 53 U/L — HIGH (ref 10–45)
ANION GAP SERPL CALC-SCNC: 15 MMOL/L — SIGNIFICANT CHANGE UP (ref 5–17)
AST SERPL-CCNC: 54 U/L — HIGH (ref 10–40)
BILIRUB SERPL-MCNC: 1.2 MG/DL — SIGNIFICANT CHANGE UP (ref 0.2–1.2)
BUN SERPL-MCNC: 4 MG/DL — LOW (ref 7–23)
CALCIUM SERPL-MCNC: 9.4 MG/DL — SIGNIFICANT CHANGE UP (ref 8.4–10.5)
CHLORIDE SERPL-SCNC: 100 MMOL/L — SIGNIFICANT CHANGE UP (ref 96–108)
CO2 SERPL-SCNC: 25 MMOL/L — SIGNIFICANT CHANGE UP (ref 22–31)
CREAT SERPL-MCNC: 0.5 MG/DL — SIGNIFICANT CHANGE UP (ref 0.5–1.3)
GLUCOSE SERPL-MCNC: 136 MG/DL — HIGH (ref 70–99)
HCT VFR BLD CALC: 40.5 % — SIGNIFICANT CHANGE UP (ref 34.5–45)
HGB BLD-MCNC: 13.5 G/DL — SIGNIFICANT CHANGE UP (ref 11.5–15.5)
MAGNESIUM SERPL-MCNC: 1.6 MG/DL — SIGNIFICANT CHANGE UP (ref 1.6–2.6)
MCHC RBC-ENTMCNC: 32.5 PG — SIGNIFICANT CHANGE UP (ref 27–34)
MCHC RBC-ENTMCNC: 33.3 GM/DL — SIGNIFICANT CHANGE UP (ref 32–36)
MCV RBC AUTO: 97.4 FL — SIGNIFICANT CHANGE UP (ref 80–100)
NRBC # BLD: 0 /100 WBCS — SIGNIFICANT CHANGE UP (ref 0–0)
PLATELET # BLD AUTO: 153 K/UL — SIGNIFICANT CHANGE UP (ref 150–400)
POTASSIUM SERPL-MCNC: 3.9 MMOL/L — SIGNIFICANT CHANGE UP (ref 3.5–5.3)
POTASSIUM SERPL-SCNC: 3.9 MMOL/L — SIGNIFICANT CHANGE UP (ref 3.5–5.3)
PROT SERPL-MCNC: 7 G/DL — SIGNIFICANT CHANGE UP (ref 6–8.3)
RBC # BLD: 4.16 M/UL — SIGNIFICANT CHANGE UP (ref 3.8–5.2)
RBC # FLD: 13.7 % — SIGNIFICANT CHANGE UP (ref 10.3–14.5)
SODIUM SERPL-SCNC: 140 MMOL/L — SIGNIFICANT CHANGE UP (ref 135–145)
WBC # BLD: 5.57 K/UL — SIGNIFICANT CHANGE UP (ref 3.8–10.5)
WBC # FLD AUTO: 5.57 K/UL — SIGNIFICANT CHANGE UP (ref 3.8–10.5)

## 2019-06-16 RX ORDER — MAGNESIUM SULFATE 500 MG/ML
2 VIAL (ML) INJECTION ONCE
Refills: 0 | Status: COMPLETED | OUTPATIENT
Start: 2019-06-16 | End: 2019-06-16

## 2019-06-16 RX ADMIN — Medication 25 MILLIGRAM(S): at 19:54

## 2019-06-16 RX ADMIN — AMLODIPINE BESYLATE 2.5 MILLIGRAM(S): 2.5 TABLET ORAL at 06:46

## 2019-06-16 RX ADMIN — Medication 1 TABLET(S): at 12:05

## 2019-06-16 RX ADMIN — Medication 100 MILLIGRAM(S): at 12:08

## 2019-06-16 RX ADMIN — Medication 25 MILLIGRAM(S): at 04:10

## 2019-06-16 RX ADMIN — Medication 25 MILLIGRAM(S): at 12:05

## 2019-06-16 RX ADMIN — Medication 1 MILLIGRAM(S): at 12:05

## 2019-06-16 RX ADMIN — Medication 50 GRAM(S): at 10:39

## 2019-06-16 NOTE — PROGRESS NOTE ADULT - SUBJECTIVE AND OBJECTIVE BOX
coverage for Dr Pizano    This is a 44 y/o Female with PMH of EtOH abuse, HTN presents to [facility] with concerns of alcohol withdrawal, anxiety, and palpitations. She states that she was last in the hospital in January for ETOH withdrawal. She quit drinking since then, however in May started etoh again for a few days. She weaned herself off and was taking naltrexone at home. She picked up etoh two weeks ago and has been drinking daily since then. She states she drinks about a pint a night, but cant be clear. . 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     admitted for ETOH withdrawal    REVIEW OF SYSTEMS:  Constitutional: No fever, weight loss or fatigue  ENMT:  No difficulty hearing, tinnitus, vertigo; No sinus or throat pain  Respiratory: No cough, wheezing, chills or hemoptysis  Cardiovascular: c/o palpitations, no dizziness or leg swelling  Gastrointestinal: No abdominal or epigastric pain. No nausea, vomiting or hematemesis; No diarrhea or constipation. No melena or hematochezia.  Skin: No itching, burning, rashes or lesions   Musculoskeletal: No joint pain or swelling; No muscle, back or extremity pain    PAST MEDICAL & SURGICAL HISTORY:  Hypertension  Alcohol abuse  S/P wrist surgery      FAMILY HISTORY:  No family history of cardiovascular disease (Father)  Family history of osteoporosis (Mother)  Family history of Parkinson disease (Mother)      SOCIAL HISTORY:  Smoking Status: [ ] Current, [ ] Former, [ ] Never  Pack Years:    MEDICATIONS:  MEDICATIONS  (STANDING):  amLODIPine   Tablet 2.5 milliGRAM(s) Oral daily  chlordiazePOXIDE 25 milliGRAM(s) Oral every 8 hours  folic acid 1 milliGRAM(s) Oral daily  multivitamin 1 Tablet(s) Oral daily  sodium chloride 0.9%. 1000 milliLiter(s) (100 mL/Hr) IV Continuous <Continuous>  thiamine 100 milliGRAM(s) Oral daily    MEDICATIONS  (PRN):  LORazepam   Injectable 1 milliGRAM(s) IV Push every 4 hours PRN CIWA >8      Allergies    predniSONE (Rash)  Tamiflu (Other)    Intolerances        Vital Signs Last 24 Hrs  T(C): 36.4 (16 Jun 2019 10:08), Max: 37.1 (15 Leo 2019 14:26)  T(F): 97.6 (16 Jun 2019 10:08), Max: 98.8 (15 Leo 2019 14:26)  HR: 93 (16 Jun 2019 10:08) (90 - 118)  BP: 141/90 (16 Jun 2019 10:08) (122/74 - 154/97)  BP(mean): --  RR: 17 (16 Jun 2019 10:08) (16 - 18)  SpO2: 99% (16 Jun 2019 10:08) (90% - 99%)    06-16 @ 07:01  -  06-16 @ 12:16  --------------------------------------------------------  IN: 450 mL / OUT: 400 mL / NET: 50 mL          PHYSICAL EXAM:    General: Well developed; well nourished; in no acute distress  HEENT: MMM, conjunctiva and sclera clear  Lungs clear  Heart: tachycardic  Gastrointestinal: Soft, non-tender non-distended; Normal bowel sounds; No rebound or guarding  Extremities: Normal range of motion, No clubbing, cyanosis or edema  Neurological: Alert and oriented x3  Skin: Warm and dry. No obvious rash  Neuro: no tremors      LABS:                        13.5   5.57  )-----------( 153      ( 16 Jun 2019 07:57 )             40.5     06-16    140  |  100  |  4<L>  ----------------------------<  136<H>  3.9   |  25  |  0.50    Ca    9.4      16 Jun 2019 07:57  Mg     1.6     06-16    TPro  7.0  /  Alb  4.3  /  TBili  1.2  /  DBili  x   /  AST  54<H>  /  ALT  53<H>  /  AlkPhos  57  06-16          RADIOLOGY & ADDITIONAL STUDIES:

## 2019-06-16 NOTE — PROGRESS NOTE ADULT - PROBLEM SELECTOR PLAN 6
1) PCP Contacted on Admission: (Y/N) --> Name & Phone #: Harinder  2) Date of Contact with PCP:  3) PCP Contacted at Discharge: (Y/N, N/A)  4) Summary of Handoff Given to PCP:   5) Post-Discharge Appointment Date and Location:

## 2019-06-16 NOTE — PROGRESS NOTE ADULT - PROBLEM SELECTOR PLAN 2
atient with history of HTN, on Amlodipine 2. 5 mg daily. Normotensive.   - Continue with medication.

## 2019-06-16 NOTE — PROGRESS NOTE ADULT - PROBLEM SELECTOR PLAN 1
Patient presenting to [facility] ED with complaints of anxiety, palpitations, long-standing history of EtOH abuse , no prior history of withdrawal seizures, DTs  - EtOH level on admission 183; last drink 6/15 am  - s/p Librium 25 mg PO x1, Ativan 1 mg IVP x4  - CIWA 2 for mild anxiety, and tremor during exam on 6/16 am  - CIWA Protocol, monitor q4h  - continue Librium 25 mg q8h. consider tapering tomorrow  - continue Ativan 1 mg IVP q2h PRN for CIWA >8. has not required additional overnight  - Fall Precautions   - MV/Folic Acid/Thiamine

## 2019-06-16 NOTE — PROGRESS NOTE ADULT - SUBJECTIVE AND OBJECTIVE BOX
OVERNIGHT EVENTS: ROD    SUBJECTIVE: Patient with CIWA 2 for mild anxiety and tremors. Overall feels well.    Vital Signs Last 12 Hrs  T(F): 98 (06-16-19 @ 06:58), Max: 98 (06-16-19 @ 06:58)  HR: 90 (06-16-19 @ 06:58) (90 - 90)  BP: 150/93 (06-16-19 @ 06:58) (150/93 - 150/93)  BP(mean): --  RR: 17 (06-16-19 @ 06:58) (17 - 17)  SpO2: 96% (06-16-19 @ 06:58) (96% - 96%)  I&O's Summary    16 Jun 2019 07:01  -  16 Jun 2019 09:55  --------------------------------------------------------  IN: 450 mL / OUT: 0 mL / NET: 450 mL        PHYSICAL EXAM:  Constitutional: NAD, comfortable in bed, mildly tremulous  HEENT: NC/AT, PERRLA, EOMI, no conjunctival pallor or scleral icterus, MMM  Neck: Supple, no JVD  Respiratory: Normal rate, rhythm, depth, effort. CTAB. No w/r/r.   Cardiovascular: RRR, normal S1 and S2, no m/r/g.   Gastrointestinal: +BS, soft NTND, no guarding or rebound tenderness, no palpable masses   Extremities: wwp; no cyanosis, clubbing or edema.   Vascular: Pulses equal and strong throughout.   Neurological: AAOx3, no CN deficits, strength and sensation intact throughout.   Skin: No gross skin abnormalities or rashes        LABS:                        13.5   5.57  )-----------( 153      ( 16 Jun 2019 07:57 )             40.5     06-16    140  |  100  |  4<L>  ----------------------------<  136<H>  3.9   |  25  |  0.50    Ca    9.4      16 Jun 2019 07:57  Mg     1.6     06-16    TPro  7.0  /  Alb  4.3  /  TBili  1.2  /  DBili  x   /  AST  54<H>  /  ALT  53<H>  /  AlkPhos  57  06-16          RADIOLOGY & ADDITIONAL TESTS:    MEDICATIONS  (STANDING):  amLODIPine   Tablet 2.5 milliGRAM(s) Oral daily  chlordiazePOXIDE 25 milliGRAM(s) Oral every 8 hours  folic acid 1 milliGRAM(s) Oral daily  magnesium sulfate  IVPB 2 Gram(s) IV Intermittent once  multivitamin 1 Tablet(s) Oral daily  sodium chloride 0.9%. 1000 milliLiter(s) (100 mL/Hr) IV Continuous <Continuous>  thiamine 100 milliGRAM(s) Oral daily    MEDICATIONS  (PRN):  LORazepam   Injectable 1 milliGRAM(s) IV Push every 4 hours PRN CIWA >8

## 2019-06-16 NOTE — PROGRESS NOTE ADULT - ASSESSMENT
A 44 y/o Female with PMH of EtOH abuse, HTN presents to [facility] with concerns of alcohol withdrawal, , diaphoresis, and palpitations after her last drink this morning, admitted for management of ETOH abuse and concern for withdrawal.

## 2019-06-17 LAB
ALBUMIN SERPL ELPH-MCNC: 4 G/DL — SIGNIFICANT CHANGE UP (ref 3.3–5)
ALP SERPL-CCNC: 46 U/L — SIGNIFICANT CHANGE UP (ref 40–120)
ALT FLD-CCNC: 42 U/L — SIGNIFICANT CHANGE UP (ref 10–45)
ANION GAP SERPL CALC-SCNC: 9 MMOL/L — SIGNIFICANT CHANGE UP (ref 5–17)
AST SERPL-CCNC: 40 U/L — SIGNIFICANT CHANGE UP (ref 10–40)
BILIRUB SERPL-MCNC: 0.8 MG/DL — SIGNIFICANT CHANGE UP (ref 0.2–1.2)
BUN SERPL-MCNC: 4 MG/DL — LOW (ref 7–23)
CALCIUM SERPL-MCNC: 9.2 MG/DL — SIGNIFICANT CHANGE UP (ref 8.4–10.5)
CHLORIDE SERPL-SCNC: 106 MMOL/L — SIGNIFICANT CHANGE UP (ref 96–108)
CO2 SERPL-SCNC: 28 MMOL/L — SIGNIFICANT CHANGE UP (ref 22–31)
CREAT SERPL-MCNC: 0.57 MG/DL — SIGNIFICANT CHANGE UP (ref 0.5–1.3)
GLUCOSE SERPL-MCNC: 100 MG/DL — HIGH (ref 70–99)
MAGNESIUM SERPL-MCNC: 1.9 MG/DL — SIGNIFICANT CHANGE UP (ref 1.6–2.6)
PHOSPHATE SERPL-MCNC: 3.8 MG/DL — SIGNIFICANT CHANGE UP (ref 2.5–4.5)
POTASSIUM SERPL-MCNC: 4.6 MMOL/L — SIGNIFICANT CHANGE UP (ref 3.5–5.3)
POTASSIUM SERPL-SCNC: 4.6 MMOL/L — SIGNIFICANT CHANGE UP (ref 3.5–5.3)
PROT SERPL-MCNC: 6.5 G/DL — SIGNIFICANT CHANGE UP (ref 6–8.3)
SODIUM SERPL-SCNC: 143 MMOL/L — SIGNIFICANT CHANGE UP (ref 135–145)

## 2019-06-17 RX ADMIN — Medication 25 MILLIGRAM(S): at 15:05

## 2019-06-17 RX ADMIN — SODIUM CHLORIDE 100 MILLILITER(S): 9 INJECTION INTRAMUSCULAR; INTRAVENOUS; SUBCUTANEOUS at 11:08

## 2019-06-17 RX ADMIN — Medication 25 MILLIGRAM(S): at 03:10

## 2019-06-17 RX ADMIN — AMLODIPINE BESYLATE 2.5 MILLIGRAM(S): 2.5 TABLET ORAL at 06:36

## 2019-06-17 RX ADMIN — Medication 1 TABLET(S): at 11:37

## 2019-06-17 RX ADMIN — Medication 100 MILLIGRAM(S): at 11:37

## 2019-06-17 RX ADMIN — Medication 1 MILLIGRAM(S): at 11:37

## 2019-06-17 RX ADMIN — SODIUM CHLORIDE 100 MILLILITER(S): 9 INJECTION INTRAMUSCULAR; INTRAVENOUS; SUBCUTANEOUS at 03:10

## 2019-06-17 NOTE — PROGRESS NOTE ADULT - SUBJECTIVE AND OBJECTIVE BOX
OVERNIGHT EVENTS: ROD    SUBJECTIVE: Pt seen and examined at bedside. Not currently tremulous, nauseous, diaphoretic or anxious, but does endorse fatigue. Also denies fever, chills, headache, chest pain, shortness of breath, abdominal pain, n/v/d/c, numbness, weakness, tingling.     Vital Signs Last 12 Hrs  T(F): 98.2 (06-17-19 @ 10:39), Max: 98.2 (06-17-19 @ 10:39)  HR: 100 (06-17-19 @ 10:39) (97 - 100)  BP: 133/85 (06-17-19 @ 10:39) (133/85 - 142/89)  BP(mean): --  RR: 18 (06-17-19 @ 10:39) (16 - 18)  SpO2: 100% (06-17-19 @ 10:39) (99% - 100%)  I&O's Summary    16 Jun 2019 07:01  -  17 Jun 2019 07:00  --------------------------------------------------------  IN: 2100 mL / OUT: 1454 mL / NET: 646 mL        PHYSICAL EXAM:  Constitutional: NAD, comfortable in bed, mildly tremulous  HEENT: NC/AT, PERRLA, EOMI, no conjunctival pallor or scleral icterus, MMM  Neck: Supple, no JVD  Respiratory: Normal rate, rhythm, depth, effort. CTAB. No w/r/r.   Cardiovascular: RRR, normal S1 and S2, no m/r/g.   Gastrointestinal: +BS, soft NTND, no guarding or rebound tenderness, no palpable masses   Extremities: wwp; no cyanosis, clubbing or edema.   Vascular: Pulses equal and strong throughout.   Neurological: AAOx3, no CN deficits, strength and sensation intact throughout.   Skin: No gross skin abnormalities or rashes        LABS:                        13.5   5.57  )-----------( 153      ( 16 Jun 2019 07:57 )             40.5     06-17    143  |  106  |  4<L>  ----------------------------<  100<H>  4.6   |  28  |  0.57    Ca    9.2      17 Jun 2019 06:50  Phos  3.8     06-17  Mg     1.9     06-17    TPro  6.5  /  Alb  4.0  /  TBili  0.8  /  DBili  x   /  AST  40  /  ALT  42  /  AlkPhos  46  06-17          RADIOLOGY & ADDITIONAL TESTS:    MEDICATIONS  (STANDING):  amLODIPine   Tablet 2.5 milliGRAM(s) Oral daily  chlordiazePOXIDE 25 milliGRAM(s) Oral every 12 hours  folic acid 1 milliGRAM(s) Oral daily  multivitamin 1 Tablet(s) Oral daily  sodium chloride 0.9%. 1000 milliLiter(s) (100 mL/Hr) IV Continuous <Continuous>  thiamine 100 milliGRAM(s) Oral daily    MEDICATIONS  (PRN):  LORazepam   Injectable 1 milliGRAM(s) IV Push every 4 hours PRN CIWA >8

## 2019-06-17 NOTE — PROGRESS NOTE ADULT - ASSESSMENT
A 44 y/o Female with PMH of EtOH abuse, HTN presents to [facility] with concerns of alcohol withdrawal, , diaphoresis, and palpitations after her last drink this morning, admitted for management of ETOH abuse and concern for withdrawal.

## 2019-06-17 NOTE — PROGRESS NOTE ADULT - PROBLEM SELECTOR PLAN 2
Patient with history of HTN, on Amlodipine 2. 5 mg daily. Normotensive.   - Continue with medication.

## 2019-06-17 NOTE — PROGRESS NOTE ADULT - PROBLEM SELECTOR PLAN 1
Patient presenting to [facility] ED with complaints of anxiety, palpitations, long-standing history of EtOH abuse , no prior history of withdrawal seizures, DTs  - EtOH level on admission 183; last drink 6/15 am  - s/p Librium 25 mg PO x1, Ativan 1 mg IVP x4  - CIWA 0 6/17 AM exam  - CIWA Protocol, monitor q4h  - decreased librium to 25 q12  - continue Ativan 1 mg IVP q2h PRN for CIWA >8. has not required additional overnight  - Fall Precautions   - MV/Folic Acid/Thiamine

## 2019-06-18 ENCOUNTER — TRANSCRIPTION ENCOUNTER (OUTPATIENT)
Age: 44
End: 2019-06-18

## 2019-06-18 VITALS
SYSTOLIC BLOOD PRESSURE: 131 MMHG | TEMPERATURE: 98 F | DIASTOLIC BLOOD PRESSURE: 90 MMHG | HEART RATE: 98 BPM | RESPIRATION RATE: 18 BRPM | OXYGEN SATURATION: 100 %

## 2019-06-18 RX ADMIN — Medication 25 MILLIGRAM(S): at 04:22

## 2019-06-18 RX ADMIN — Medication 100 MILLIGRAM(S): at 11:43

## 2019-06-18 RX ADMIN — Medication 1 TABLET(S): at 11:43

## 2019-06-18 RX ADMIN — AMLODIPINE BESYLATE 2.5 MILLIGRAM(S): 2.5 TABLET ORAL at 06:26

## 2019-06-18 RX ADMIN — SODIUM CHLORIDE 100 MILLILITER(S): 9 INJECTION INTRAMUSCULAR; INTRAVENOUS; SUBCUTANEOUS at 06:26

## 2019-06-18 RX ADMIN — Medication 1 MILLIGRAM(S): at 11:43

## 2019-06-18 NOTE — DISCHARGE NOTE PROVIDER - NSDCCPCAREPLAN_GEN_ALL_CORE_FT
PRINCIPAL DISCHARGE DIAGNOSIS  Diagnosis: Alcohol withdrawal syndrome with complication  Assessment and Plan of Treatment: You came to the hospital with symptoms of alcohol withdrawal. You were treated with fluids and medications to control the symptoms. During the course of your hospitalization you improved clinically and were deemed medically stable for discharge. Please follow up with Dr. Pizano during your scheduled appointment.      SECONDARY DISCHARGE DIAGNOSES  Diagnosis: Hypertension  Assessment and Plan of Treatment: You were previously diagnosed with high blood pressure. Please continue on your home medications at this time and follow up with your PMD for further surveillance and management of your high blood pressure.

## 2019-06-18 NOTE — DISCHARGE NOTE PROVIDER - HOSPITAL COURSE
A 44 y/o Female with PMH of EtOH abuse, HTN presents to [facility] with concerns of alcohol withdrawal, , diaphoresis, and palpitations after her last drink this morning, admitted for management of ETOH abuse and concern for withdrawal.  Pt was treated with a banana bag, librium which was tapered down and then stopped, and did not require ativan. Pt clinically improved and was deemed medically stable for discharge with follow up with Dr. Pizano. Prior to discharge pt was seen and evaluated, counseled on alcohol cessation, and any questions or concerns were appropriately answered and addressed.

## 2019-06-18 NOTE — DISCHARGE NOTE PROVIDER - CARE PROVIDER_API CALL
Kaleb Pizano)  Medicine  [address] [place] [address]  Phone: [phone]  Fax: [phone]  Follow Up Time:

## 2019-06-18 NOTE — DISCHARGE NOTE NURSING/CASE MANAGEMENT/SOCIAL WORK - NSDCDPATPORTLINK_GEN_ALL_CORE
You can access the [facility] Patient Portal, offered by [facility], by registering with the following website: [email or web address]

## 2019-06-21 DIAGNOSIS — F10.239 ALCOHOL DEPENDENCE WITH WITHDRAWAL, UNSPECIFIED: ICD-10-CM

## 2019-06-21 DIAGNOSIS — R00.2 PALPITATIONS: ICD-10-CM

## 2019-06-21 DIAGNOSIS — I10 ESSENTIAL (PRIMARY) HYPERTENSION: ICD-10-CM

## 2019-06-21 DIAGNOSIS — F41.9 ANXIETY DISORDER, UNSPECIFIED: ICD-10-CM

## 2019-07-10 PROCEDURE — 80307 DRUG TEST PRSMV CHEM ANLYZR: CPT

## 2019-07-10 PROCEDURE — 96376 TX/PRO/DX INJ SAME DRUG ADON: CPT

## 2019-07-10 PROCEDURE — 93005 ELECTROCARDIOGRAM TRACING: CPT

## 2019-07-10 PROCEDURE — 80053 COMPREHEN METABOLIC PANEL: CPT

## 2019-07-10 PROCEDURE — 83690 ASSAY OF LIPASE: CPT

## 2019-07-10 PROCEDURE — 84100 ASSAY OF PHOSPHORUS: CPT

## 2019-07-10 PROCEDURE — 96375 TX/PRO/DX INJ NEW DRUG ADDON: CPT

## 2019-07-10 PROCEDURE — 83735 ASSAY OF MAGNESIUM: CPT

## 2019-07-10 PROCEDURE — 96374 THER/PROPH/DIAG INJ IV PUSH: CPT

## 2019-07-10 PROCEDURE — 36415 COLL VENOUS BLD VENIPUNCTURE: CPT

## 2019-07-10 PROCEDURE — 85025 COMPLETE CBC W/AUTO DIFF WBC: CPT

## 2019-07-10 PROCEDURE — 85027 COMPLETE CBC AUTOMATED: CPT

## 2019-07-10 PROCEDURE — 99285 EMERGENCY DEPT VISIT HI MDM: CPT | Mod: 25

## 2019-08-11 ENCOUNTER — INPATIENT (INPATIENT)
Facility: HOSPITAL | Age: 44
LOS: 4 days | Discharge: HOME | DRG: 897 | End: 2019-08-16
Attending: INTERNAL MEDICINE | Admitting: INTERNAL MEDICINE
Payer: MEDICAID

## 2019-08-11 VITALS
HEART RATE: 135 BPM | WEIGHT: 153 LBS | RESPIRATION RATE: 18 BRPM | OXYGEN SATURATION: 99 % | TEMPERATURE: 98 F | DIASTOLIC BLOOD PRESSURE: 77 MMHG | SYSTOLIC BLOOD PRESSURE: 135 MMHG | HEIGHT: 66 IN

## 2019-08-11 DIAGNOSIS — Z91.89 OTHER SPECIFIED PERSONAL RISK FACTORS, NOT ELSEWHERE CLASSIFIED: ICD-10-CM

## 2019-08-11 DIAGNOSIS — R63.8 OTHER SYMPTOMS AND SIGNS CONCERNING FOOD AND FLUID INTAKE: ICD-10-CM

## 2019-08-11 DIAGNOSIS — Z29.9 ENCOUNTER FOR PROPHYLACTIC MEASURES, UNSPECIFIED: ICD-10-CM

## 2019-08-11 DIAGNOSIS — R74.0 NONSPECIFIC ELEVATION OF LEVELS OF TRANSAMINASE AND LACTIC ACID DEHYDROGENASE [LDH]: ICD-10-CM

## 2019-08-11 DIAGNOSIS — D69.6 THROMBOCYTOPENIA, UNSPECIFIED: ICD-10-CM

## 2019-08-11 DIAGNOSIS — Z98.890 OTHER SPECIFIED POSTPROCEDURAL STATES: Chronic | ICD-10-CM

## 2019-08-11 DIAGNOSIS — I10 ESSENTIAL (PRIMARY) HYPERTENSION: ICD-10-CM

## 2019-08-11 DIAGNOSIS — F10.239 ALCOHOL DEPENDENCE WITH WITHDRAWAL, UNSPECIFIED: ICD-10-CM

## 2019-08-11 LAB
ALBUMIN SERPL ELPH-MCNC: 4.3 G/DL — SIGNIFICANT CHANGE UP (ref 3.3–5)
ALBUMIN SERPL ELPH-MCNC: 5.5 G/DL — HIGH (ref 3.3–5)
ALP SERPL-CCNC: 63 U/L — SIGNIFICANT CHANGE UP (ref 40–120)
ALP SERPL-CCNC: 85 U/L — SIGNIFICANT CHANGE UP (ref 40–120)
ALT FLD-CCNC: 165 U/L — HIGH (ref 10–45)
ALT FLD-CCNC: 263 U/L — HIGH (ref 10–45)
AMPHET UR-MCNC: NEGATIVE — SIGNIFICANT CHANGE UP
ANION GAP SERPL CALC-SCNC: 16 MMOL/L — SIGNIFICANT CHANGE UP (ref 5–17)
ANION GAP SERPL CALC-SCNC: 19 MMOL/L — HIGH (ref 5–17)
ANION GAP SERPL CALC-SCNC: 22 MMOL/L — HIGH (ref 5–17)
APPEARANCE UR: CLEAR — SIGNIFICANT CHANGE UP
APTT BLD: 30.1 SEC — SIGNIFICANT CHANGE UP (ref 27.5–36.3)
AST SERPL-CCNC: 181 U/L — HIGH (ref 10–40)
AST SERPL-CCNC: 312 U/L — HIGH (ref 10–40)
BARBITURATES UR SCN-MCNC: NEGATIVE — SIGNIFICANT CHANGE UP
BASE EXCESS BLDV CALC-SCNC: 0.6 MMOL/L — SIGNIFICANT CHANGE UP
BASOPHILS # BLD AUTO: 0.06 K/UL — SIGNIFICANT CHANGE UP (ref 0–0.2)
BASOPHILS NFR BLD AUTO: 1.8 % — SIGNIFICANT CHANGE UP (ref 0–2)
BENZODIAZ UR-MCNC: NEGATIVE — SIGNIFICANT CHANGE UP
BILIRUB SERPL-MCNC: 1.1 MG/DL — SIGNIFICANT CHANGE UP (ref 0.2–1.2)
BILIRUB SERPL-MCNC: 1.1 MG/DL — SIGNIFICANT CHANGE UP (ref 0.2–1.2)
BILIRUB UR-MCNC: NEGATIVE — SIGNIFICANT CHANGE UP
BUN SERPL-MCNC: 4 MG/DL — LOW (ref 7–23)
BUN SERPL-MCNC: 5 MG/DL — LOW (ref 7–23)
BUN SERPL-MCNC: 6 MG/DL — LOW (ref 7–23)
CALCIUM SERPL-MCNC: 7.4 MG/DL — LOW (ref 8.4–10.5)
CALCIUM SERPL-MCNC: 8.3 MG/DL — LOW (ref 8.4–10.5)
CALCIUM SERPL-MCNC: 9.1 MG/DL — SIGNIFICANT CHANGE UP (ref 8.4–10.5)
CHLORIDE SERPL-SCNC: 103 MMOL/L — SIGNIFICANT CHANGE UP (ref 96–108)
CHLORIDE SERPL-SCNC: 94 MMOL/L — LOW (ref 96–108)
CHLORIDE SERPL-SCNC: 98 MMOL/L — SIGNIFICANT CHANGE UP (ref 96–108)
CK SERPL-CCNC: 263 U/L — HIGH (ref 25–170)
CK SERPL-CCNC: 336 U/L — HIGH (ref 25–170)
CO2 SERPL-SCNC: 21 MMOL/L — LOW (ref 22–31)
CO2 SERPL-SCNC: 24 MMOL/L — SIGNIFICANT CHANGE UP (ref 22–31)
CO2 SERPL-SCNC: 25 MMOL/L — SIGNIFICANT CHANGE UP (ref 22–31)
COCAINE METAB.OTHER UR-MCNC: NEGATIVE — SIGNIFICANT CHANGE UP
COLOR SPEC: YELLOW — SIGNIFICANT CHANGE UP
CREAT SERPL-MCNC: 0.48 MG/DL — LOW (ref 0.5–1.3)
CREAT SERPL-MCNC: 0.52 MG/DL — SIGNIFICANT CHANGE UP (ref 0.5–1.3)
CREAT SERPL-MCNC: 0.56 MG/DL — SIGNIFICANT CHANGE UP (ref 0.5–1.3)
DIFF PNL FLD: NEGATIVE — SIGNIFICANT CHANGE UP
EOSINOPHIL # BLD AUTO: 0 K/UL — SIGNIFICANT CHANGE UP (ref 0–0.5)
EOSINOPHIL NFR BLD AUTO: 0 % — SIGNIFICANT CHANGE UP (ref 0–6)
ETHANOL SERPL-MCNC: 458 MG/DL — HIGH (ref 0–10)
GLUCOSE SERPL-MCNC: 161 MG/DL — HIGH (ref 70–99)
GLUCOSE SERPL-MCNC: 93 MG/DL — SIGNIFICANT CHANGE UP (ref 70–99)
GLUCOSE SERPL-MCNC: 97 MG/DL — SIGNIFICANT CHANGE UP (ref 70–99)
GLUCOSE UR QL: NEGATIVE — SIGNIFICANT CHANGE UP
HCG UR QL: NEGATIVE — SIGNIFICANT CHANGE UP
HCO3 BLDV-SCNC: 25 MMOL/L — SIGNIFICANT CHANGE UP (ref 20–27)
HCT VFR BLD CALC: 38.5 % — SIGNIFICANT CHANGE UP (ref 34.5–45)
HCT VFR BLD CALC: 38.9 % — SIGNIFICANT CHANGE UP (ref 34.5–45)
HCT VFR BLD CALC: 48 % — HIGH (ref 34.5–45)
HGB BLD-MCNC: 13 G/DL — SIGNIFICANT CHANGE UP (ref 11.5–15.5)
HGB BLD-MCNC: 13.4 G/DL — SIGNIFICANT CHANGE UP (ref 11.5–15.5)
HGB BLD-MCNC: 16.6 G/DL — HIGH (ref 11.5–15.5)
IMM GRANULOCYTES NFR BLD AUTO: 0 % — SIGNIFICANT CHANGE UP (ref 0–1.5)
INR BLD: 1.01 — SIGNIFICANT CHANGE UP (ref 0.88–1.16)
KETONES UR-MCNC: ABNORMAL MG/DL
LEUKOCYTE ESTERASE UR-ACNC: NEGATIVE — SIGNIFICANT CHANGE UP
LYMPHOCYTES # BLD AUTO: 0.99 K/UL — LOW (ref 1–3.3)
LYMPHOCYTES # BLD AUTO: 29.1 % — SIGNIFICANT CHANGE UP (ref 13–44)
MAGNESIUM SERPL-MCNC: 1.6 MG/DL — SIGNIFICANT CHANGE UP (ref 1.6–2.6)
MCHC RBC-ENTMCNC: 32.6 PG — SIGNIFICANT CHANGE UP (ref 27–34)
MCHC RBC-ENTMCNC: 32.8 PG — SIGNIFICANT CHANGE UP (ref 27–34)
MCHC RBC-ENTMCNC: 33.2 PG — SIGNIFICANT CHANGE UP (ref 27–34)
MCHC RBC-ENTMCNC: 33.8 GM/DL — SIGNIFICANT CHANGE UP (ref 32–36)
MCHC RBC-ENTMCNC: 34.4 GM/DL — SIGNIFICANT CHANGE UP (ref 32–36)
MCHC RBC-ENTMCNC: 34.6 GM/DL — SIGNIFICANT CHANGE UP (ref 32–36)
MCV RBC AUTO: 94.9 FL — SIGNIFICANT CHANGE UP (ref 80–100)
MCV RBC AUTO: 96.3 FL — SIGNIFICANT CHANGE UP (ref 80–100)
MCV RBC AUTO: 96.5 FL — SIGNIFICANT CHANGE UP (ref 80–100)
METHADONE UR-MCNC: NEGATIVE — SIGNIFICANT CHANGE UP
MONOCYTES # BLD AUTO: 0.57 K/UL — SIGNIFICANT CHANGE UP (ref 0–0.9)
MONOCYTES NFR BLD AUTO: 16.8 % — HIGH (ref 2–14)
NEUTROPHILS # BLD AUTO: 1.78 K/UL — LOW (ref 1.8–7.4)
NEUTROPHILS NFR BLD AUTO: 52.3 % — SIGNIFICANT CHANGE UP (ref 43–77)
NITRITE UR-MCNC: NEGATIVE — SIGNIFICANT CHANGE UP
NRBC # BLD: 0 /100 WBCS — SIGNIFICANT CHANGE UP (ref 0–0)
OPIATES UR-MCNC: NEGATIVE — SIGNIFICANT CHANGE UP
PCO2 BLDV: 41 MMHG — SIGNIFICANT CHANGE UP (ref 41–51)
PCP SPEC-MCNC: SIGNIFICANT CHANGE UP
PCP UR-MCNC: NEGATIVE — SIGNIFICANT CHANGE UP
PH BLDV: 7.41 — SIGNIFICANT CHANGE UP (ref 7.32–7.43)
PH UR: 6.5 — SIGNIFICANT CHANGE UP (ref 5–8)
PHOSPHATE SERPL-MCNC: 2.5 MG/DL — SIGNIFICANT CHANGE UP (ref 2.5–4.5)
PLATELET # BLD AUTO: 108 K/UL — LOW (ref 150–400)
PLATELET # BLD AUTO: 129 K/UL — LOW (ref 150–400)
PLATELET # BLD AUTO: 87 K/UL — LOW (ref 150–400)
PO2 BLDV: 44 MMHG — SIGNIFICANT CHANGE UP
POTASSIUM SERPL-MCNC: 3.4 MMOL/L — LOW (ref 3.5–5.3)
POTASSIUM SERPL-MCNC: 3.7 MMOL/L — SIGNIFICANT CHANGE UP (ref 3.5–5.3)
POTASSIUM SERPL-MCNC: 3.8 MMOL/L — SIGNIFICANT CHANGE UP (ref 3.5–5.3)
POTASSIUM SERPL-SCNC: 3.4 MMOL/L — LOW (ref 3.5–5.3)
POTASSIUM SERPL-SCNC: 3.7 MMOL/L — SIGNIFICANT CHANGE UP (ref 3.5–5.3)
POTASSIUM SERPL-SCNC: 3.8 MMOL/L — SIGNIFICANT CHANGE UP (ref 3.5–5.3)
PROT SERPL-MCNC: 6.7 G/DL — SIGNIFICANT CHANGE UP (ref 6–8.3)
PROT SERPL-MCNC: 8.9 G/DL — HIGH (ref 6–8.3)
PROT UR-MCNC: NEGATIVE MG/DL — SIGNIFICANT CHANGE UP
PROTHROM AB SERPL-ACNC: 11.4 SEC — SIGNIFICANT CHANGE UP (ref 10–12.9)
RBC # BLD: 3.99 M/UL — SIGNIFICANT CHANGE UP (ref 3.8–5.2)
RBC # BLD: 4.04 M/UL — SIGNIFICANT CHANGE UP (ref 3.8–5.2)
RBC # BLD: 5.06 M/UL — SIGNIFICANT CHANGE UP (ref 3.8–5.2)
RBC # FLD: 12.5 % — SIGNIFICANT CHANGE UP (ref 10.3–14.5)
SAO2 % BLDV: 71 % — SIGNIFICANT CHANGE UP
SODIUM SERPL-SCNC: 138 MMOL/L — SIGNIFICANT CHANGE UP (ref 135–145)
SODIUM SERPL-SCNC: 141 MMOL/L — SIGNIFICANT CHANGE UP (ref 135–145)
SODIUM SERPL-SCNC: 143 MMOL/L — SIGNIFICANT CHANGE UP (ref 135–145)
SP GR SPEC: 1.01 — SIGNIFICANT CHANGE UP (ref 1–1.03)
THC UR QL: NEGATIVE — SIGNIFICANT CHANGE UP
UROBILINOGEN FLD QL: 0.2 E.U./DL — SIGNIFICANT CHANGE UP
WBC # BLD: 3.4 K/UL — LOW (ref 3.8–10.5)
WBC # BLD: 3.89 K/UL — SIGNIFICANT CHANGE UP (ref 3.8–10.5)
WBC # BLD: 5.27 K/UL — SIGNIFICANT CHANGE UP (ref 3.8–10.5)
WBC # FLD AUTO: 3.4 K/UL — LOW (ref 3.8–10.5)
WBC # FLD AUTO: 3.89 K/UL — SIGNIFICANT CHANGE UP (ref 3.8–10.5)
WBC # FLD AUTO: 5.27 K/UL — SIGNIFICANT CHANGE UP (ref 3.8–10.5)

## 2019-08-11 PROCEDURE — 93010 ELECTROCARDIOGRAM REPORT: CPT

## 2019-08-11 PROCEDURE — 99285 EMERGENCY DEPT VISIT HI MDM: CPT | Mod: 25

## 2019-08-11 RX ORDER — THIAMINE MONONITRATE (VIT B1) 100 MG
100 TABLET ORAL DAILY
Refills: 0 | Status: DISCONTINUED | OUTPATIENT
Start: 2019-08-11 | End: 2019-08-16

## 2019-08-11 RX ORDER — POTASSIUM CHLORIDE 20 MEQ
40 PACKET (EA) ORAL EVERY 4 HOURS
Refills: 0 | Status: COMPLETED | OUTPATIENT
Start: 2019-08-11 | End: 2019-08-11

## 2019-08-11 RX ORDER — FOLIC ACID 0.8 MG
1 TABLET ORAL DAILY
Refills: 0 | Status: DISCONTINUED | OUTPATIENT
Start: 2019-08-11 | End: 2019-08-16

## 2019-08-11 RX ORDER — SODIUM CHLORIDE 9 MG/ML
1000 INJECTION, SOLUTION INTRAVENOUS
Refills: 0 | Status: DISCONTINUED | OUTPATIENT
Start: 2019-08-11 | End: 2019-08-11

## 2019-08-11 RX ORDER — SODIUM CHLORIDE 9 MG/ML
1000 INJECTION INTRAMUSCULAR; INTRAVENOUS; SUBCUTANEOUS
Refills: 0 | Status: DISCONTINUED | OUTPATIENT
Start: 2019-08-11 | End: 2019-08-13

## 2019-08-11 RX ORDER — SODIUM CHLORIDE 9 MG/ML
1000 INJECTION INTRAMUSCULAR; INTRAVENOUS; SUBCUTANEOUS ONCE
Refills: 0 | Status: COMPLETED | OUTPATIENT
Start: 2019-08-11 | End: 2019-08-11

## 2019-08-11 RX ORDER — SODIUM CHLORIDE 9 MG/ML
1000 INJECTION, SOLUTION INTRAVENOUS
Refills: 0 | Status: COMPLETED | OUTPATIENT
Start: 2019-08-11 | End: 2019-08-11

## 2019-08-11 RX ORDER — POTASSIUM CHLORIDE 20 MEQ
40 PACKET (EA) ORAL ONCE
Refills: 0 | Status: COMPLETED | OUTPATIENT
Start: 2019-08-11 | End: 2019-08-11

## 2019-08-11 RX ORDER — AMLODIPINE BESYLATE 2.5 MG/1
2.5 TABLET ORAL DAILY
Refills: 0 | Status: DISCONTINUED | OUTPATIENT
Start: 2019-08-11 | End: 2019-08-16

## 2019-08-11 RX ORDER — CALCIUM GLUCONATE 100 MG/ML
2 VIAL (ML) INTRAVENOUS ONCE
Refills: 0 | Status: COMPLETED | OUTPATIENT
Start: 2019-08-11 | End: 2019-08-11

## 2019-08-11 RX ADMIN — Medication 2 MILLIGRAM(S): at 10:11

## 2019-08-11 RX ADMIN — SODIUM CHLORIDE 1000 MILLILITER(S): 9 INJECTION INTRAMUSCULAR; INTRAVENOUS; SUBCUTANEOUS at 03:30

## 2019-08-11 RX ADMIN — Medication 200 GRAM(S): at 05:12

## 2019-08-11 RX ADMIN — SODIUM CHLORIDE 80 MILLILITER(S): 9 INJECTION INTRAMUSCULAR; INTRAVENOUS; SUBCUTANEOUS at 16:43

## 2019-08-11 RX ADMIN — Medication 2 GRAM(S): at 06:15

## 2019-08-11 RX ADMIN — Medication 50 MILLIGRAM(S): at 04:04

## 2019-08-11 RX ADMIN — Medication 1 TABLET(S): at 11:27

## 2019-08-11 RX ADMIN — SODIUM CHLORIDE 1000 MILLILITER(S): 9 INJECTION INTRAMUSCULAR; INTRAVENOUS; SUBCUTANEOUS at 03:00

## 2019-08-11 RX ADMIN — Medication 2 MILLIGRAM(S): at 16:43

## 2019-08-11 RX ADMIN — SODIUM CHLORIDE 125 MILLILITER(S): 9 INJECTION, SOLUTION INTRAVENOUS at 04:05

## 2019-08-11 RX ADMIN — SODIUM CHLORIDE 1000 MILLILITER(S): 9 INJECTION INTRAMUSCULAR; INTRAVENOUS; SUBCUTANEOUS at 02:48

## 2019-08-11 RX ADMIN — Medication 100 MILLIGRAM(S): at 11:27

## 2019-08-11 RX ADMIN — Medication 40 MILLIEQUIVALENT(S): at 16:52

## 2019-08-11 RX ADMIN — Medication 1 MILLIGRAM(S): at 06:27

## 2019-08-11 RX ADMIN — Medication 50 MILLIGRAM(S): at 12:30

## 2019-08-11 RX ADMIN — AMLODIPINE BESYLATE 2.5 MILLIGRAM(S): 2.5 TABLET ORAL at 10:10

## 2019-08-11 RX ADMIN — Medication 1 MILLIGRAM(S): at 11:27

## 2019-08-11 RX ADMIN — Medication 50 MILLIGRAM(S): at 19:55

## 2019-08-11 NOTE — H&P ADULT - PROBLEM SELECTOR PLAN 6
F: None  E: Monitor and replete prn  N: Dash/tlc diet  Dispo: RMF IMPROVE score of 0. No indication for pharmacological ppx.

## 2019-08-11 NOTE — ED ADULT NURSE REASSESSMENT NOTE - NS ED NURSE REASSESS COMMENT FT1
Patient assisted to ambulate to the restroom. Patient able to ambulate with minimal assist. Patient able to tolerate ambulate to and from restroom. UA sample sent. Patient reports marked improvement of symptoms.

## 2019-08-11 NOTE — ED ADULT NURSE NOTE - OBJECTIVE STATEMENT
Received a 43 year old female, reported to have been found on the floor by her significant other (boyfriend). Patient awake and verbally responsive. Patient denies any trauma and reports that she was intentionally lying on the floor. Patient with reported ETOH abuse. Last reported use was at 1800 on 08/10/2019.    Patient reports an additional complaint of loss of appetite in concurrence with her ETOH intake.

## 2019-08-11 NOTE — ED ADULT TRIAGE NOTE - STATUS:
Applied Principal Discharge DX:	Cellulitis and abscess  Goal:	recovery  Assessment and plan of treatment:	Follow up with [facility] this week, activity as tolerated, regular diet Principal Discharge DX:	Cellulitis and abscess  Goal:	recovery  Assessment and plan of treatment:	Follow up with [facility] this week, activity as tolerated, regular diet.  Call clinic if you have any fevers or abdominal pain.  You have an appointment in clinic on 2/5 (call  to confirm time).  At this time you will be given a requisition for a CT for further imaging.  Follow up with Dr. Mistry in office in ten days (call his office to make appointment)

## 2019-08-11 NOTE — H&P ADULT - PROBLEM SELECTOR PLAN 4
1) PCP Contacted on Admission: (Y/N) --> Name & Phone #:  2) Date of Contact with PCP:  3) PCP Contacted at Discharge: (Y/N)  4) Summary of Handoff Given to PCP:   5) Post-Discharge Appointment Date and Location: Patient w/ elevated transaminitis, likely 2/2 alcohol. Will obtain coags to calculate discriminant factor for concerns for alcoholic hepatitis.   -f/u coags  -trend liver enzymes

## 2019-08-11 NOTE — ED PROVIDER NOTE - ENMT, MLM
Airway patent, Nasal mucosa clear. Mouth with normal mucosa. Throat has no vesicles, no oropharyngeal exudates and uvula is midline.  nc/at

## 2019-08-11 NOTE — H&P ADULT - ASSESSMENT
A 42 y/o Female with PMH of HTN, EtOH abuse, HTN presents to [facility] with of alcohol withdrawal.

## 2019-08-11 NOTE — ED PROVIDER NOTE - OBJECTIVE STATEMENT
44 yo female h/o etoh abuse and htn brought by boyfriend after he found her on the floor when he went to check on her after she did not respond to his or her mother's texts and calls. Pt denies trauma, states she thought she would be more comfortable on the floor.  Pt reports not eating x 1 wk bc of no appetite and + heavy etoh.  Last drink 6p.  Pt reports h/o feeling shaky and anxious when she doesn't drink, no sz, no hallucinations; not currently feeling shaky.  No ha, change in vision/speech/gait, numbness or weakness in ext, cp, palpitations, sob, abd pain, n/v/d, dysuria, fever, cough.

## 2019-08-11 NOTE — ED PROVIDER NOTE - CLINICAL SUMMARY MEDICAL DECISION MAKING FREE TEXT BOX
Pt brought for etoh abuse, found on floor; pt denies injury.  Pt w tachycardia - reports no po's x 1 wk - suspect 2/2 dehydration, ? alcoholic ketoacidosis.  Pt does not appear to be in withdrawal; ciwa 1.  Plan labs, ivf, banana bag, ck to eval for rhabdo since pt on floor, ct head (low concern for ich/chi since pt denies and no evidence of trauma on exam but pt slow to answer questions), reassess.

## 2019-08-11 NOTE — H&P ADULT - NSHPLABSRESULTS_GEN_ALL_CORE
LABS:                        13.4   5.27  )-----------( 108      ( 11 Aug 2019 03:58 )             38.9     08-    143  |  103  |  5<L>  ----------------------------<  93  3.7   |  21<L>  |  0.52    Ca    7.4<L>      11 Aug 2019 03:58    TPro  8.9<H>  /  Alb  5.5<H>  /  TBili  1.1  /  DBili  x   /  AST  312<H>  /  ALT  263<H>  /  AlkPhos  85        Urinalysis Basic - ( 11 Aug 2019 07:28 )  Color: Yellow / Appearance: Clear / S.015 / pH: x  Gluc: x / Ketone: Trace mg/dL  / Bili: Negative / Urobili: 0.2 E.U./dL   Blood: x / Protein: NEGATIVE mg/dL / Nitrite: NEGATIVE   Leuk Esterase: NEGATIVE / RBC: x / WBC x   Sq Epi: x / Non Sq Epi: x / Bacteria: x    RADIOLOGY & ADDITIONAL TESTS:    MEDICATIONS  (STANDING):  chlordiazePOXIDE 50 milliGRAM(s) Oral every 8 hours  folic acid 1 milliGRAM(s) Oral daily  LORazepam   Injectable 2 milliGRAM(s) IntraMuscular once  multivitamin 1 Tablet(s) Oral daily  thiamine 100 milliGRAM(s) Oral daily    MEDICATIONS  (PRN):  LORazepam   Injectable 2 milliGRAM(s) IV Push every 2 hours PRN CIWA-Ar score 8 or greater

## 2019-08-11 NOTE — H&P ADULT - HISTORY OF PRESENT ILLNESS
Patient is a 42 yo F w/ PMHx of HTN, alcohol abuse who presents with alcohol withdrawal. The patient's boyfriend was unable to reach the patient via text or phone so he went to her apartment and found her lying on the floor, so he brought her to the ED. She denies falling/head trauma/LOC and states that she just felt more comfortable lying on the floor.  The patient states that she has been drinking about 0.5L rum a day for eight years, history of withdrawal but no seizures/ICU/intubation. Has been trying to quit on and off since January of this year. Two weeks ago she reports she stopped taking Naltrexone because she felt it wasn't helping decrease her alcohol use. She additionally reports she hasn't eaten anything other than a few bananas over the past week due to decreased appetite but has continued drinking alcohol. Otherwise denies fever, chills, CP, palpitations, SOB, cough, N/V/D/C, abdominal pain, dysuria, LE edema.     In ED, Vitals were T 97.8F, , /77, RR 18, O2 sat 99% on RA. Labs s/f Hgb 16.6, Plt 129, Anion gap of 22, Prot 8.9 Alb 5.5, , , , Serum alcohol level of 458. Utox was negative, UA was negative except for trace ketones. EKG showed sinus tachycardia. The patient was given Calcium gluconate 2g IV x1, Librium 50mg x1, Ativan 1mg x1, 2L NS and a banana bag. The patient was admitted to medicine for further management of alcohol w/d. Patient is a 44 yo F w/ PMHx of HTN, alcohol abuse who presents with alcohol withdrawal. The patient's boyfriend was unable to reach the patient via text or phone so he went to her apartment and found her lying on the floor, so he brought her to the ED. She denies falling/head trauma/LOC and states that she just felt more comfortable lying on the floor. Her last drink was yesterday between 6-8pm. The patient states that she has been drinking about 0.5L rum a day for eight years, history of withdrawal but no seizures/ICU/intubation. Has been trying to quit on and off since January of this year. Two weeks ago she reports she stopped taking Naltrexone because she felt it wasn't helping decrease her alcohol use. She additionally reports she hasn't eaten anything other than a few bananas over the past week due to decreased appetite but has continued drinking alcohol. Otherwise denies fever, chills, CP, palpitations, SOB, cough, N/V/D/C, abdominal pain, dysuria, LE edema.     In ED, Vitals were T 97.8F, , /77, RR 18, O2 sat 99% on RA. Labs s/f Hgb 16.6, Plt 129, Anion gap of 22, Prot 8.9 Alb 5.5, , , , Serum alcohol level of 458. Utox was negative, UA was negative except for trace ketones. EKG showed sinus tachycardia. The patient was given Calcium gluconate 2g IV x1, Librium 50mg x1, Ativan 1mg x1, 2L NS and a banana bag. The patient was admitted to medicine for further management of alcohol w/d.

## 2019-08-11 NOTE — PROGRESS NOTE ADULT - SUBJECTIVE AND OBJECTIVE BOX
coverage for [place]    Patient known to me from a prior admit is a 42 yo F w/ PMHx of HTN, alcohol abuse who presents with alcohol withdrawal. The patient's boyfriend was unable to reach the patient via text or phone so he went to her apartment and found her lying on the floor, so he brought her to the ED. She denies falling/head trauma/LOC and states that she just felt more comfortable lying on the floor.Evaluated in ED with dehydration and anion gap.  IVF given. Anion gap improved but was tremulous and hence advised admission due to withdrawal      REVIEW OF SYSTEMS:  Constitutional: No fever,   ENMT:  No difficulty hearing, tinnitus, vertigo; No sinus or throat pain  Respiratory: No cough, wheezing, chills or hemoptysis  Cardiovascular: No chest pain, palpitations, dizziness or leg swelling  Gastrointestinal: No abdominal or epigastric pain. No nausea, vomiting or hematemesis; No diarrhea or constipation. No melena or hematochezia.  Skin: No itching, burning, rashes or lesions   Musculoskeletal: + joint pain or swelling; + muscle, back or extremity pain    PAST MEDICAL & SURGICAL HISTORY:  Hypertension  Alcohol abuse  S/P wrist surgery      FAMILY HISTORY:  No family history of cardiovascular disease (Father)  Family history of osteoporosis (Mother)  Family history of Parkinson disease (Mother)      SOCIAL HISTORY:  Smoking Status: [ ] Current, [ ] Former, [ ] Never  Pack Years:    MEDICATIONS:  MEDICATIONS  (STANDING):  amLODIPine   Tablet 2.5 milliGRAM(s) Oral daily  chlordiazePOXIDE 50 milliGRAM(s) Oral every 8 hours  folic acid 1 milliGRAM(s) Oral daily  multivitamin 1 Tablet(s) Oral daily  thiamine 100 milliGRAM(s) Oral daily    MEDICATIONS  (PRN):  LORazepam   Injectable 2 milliGRAM(s) IV Push every 2 hours PRN CIWA-Ar score 8 or greater      Allergies    predniSONE (Rash)  Tamiflu (Other)    Intolerances        Vital Signs Last 24 Hrs  T(C): 37.2 (11 Aug 2019 10:52), Max: 37.3 (11 Aug 2019 09:41)  T(F): 98.9 (11 Aug 2019 10:52), Max: 99.1 (11 Aug 2019 09:41)  HR: 115 (11 Aug 2019 10:52) (102 - 135)  BP: 117/70 (11 Aug 2019 10:52) (117/70 - 142/76)  BP(mean): --  RR: 20 (11 Aug 2019 10:52) (18 - 22)  SpO2: 99% (11 Aug 2019 10:52) (97% - 100%)        PHYSICAL EXAM:    General: Well developed; well nourished ;tremulous but NAD  HEENT: MMM, conjunctiva and sclera clear  Lungs: clear  Heart: regular  Gastrointestinal: Soft, non-tender non-distended; Normal bowel sounds; No rebound or guarding  Extremities: Normal range of motion, No clubbing, cyanosis or edema  Neurological: Alert appropriate answers  Skin: Warm and dry. some bruises in upper extremitis      LABS:                        13.4   5.27  )-----------( 108      ( 11 Aug 2019 03:58 )             38.9     08-11    143  |  103  |  5<L>  ----------------------------<  93  3.7   |  21<L>  |  0.52    Ca    7.4<L>      11 Aug 2019 03:58    TPro  8.9<H>  /  Alb  5.5<H>  /  TBili  1.1  /  DBili  x   /  AST  312<H>  /  ALT  263<H>  /  AlkPhos  85  08-11          RADIOLOGY & ADDITIONAL STUDIES:

## 2019-08-11 NOTE — ED PROVIDER NOTE - CONSTITUTIONAL, MLM
normal... seems intoxicated, slow to answer questions, well nourished, awake, alert, oriented to person, place, time/situation and in no apparent distress.

## 2019-08-11 NOTE — ED PROVIDER NOTE - MUSCULOSKELETAL, MLM
Spine appears normal, neck and back nontender, old ecchymosis bilat ue, low back and buttock, R post shoulder, R knee, no bony ttp or deformity, range of motion is not limited, no muscle or joint tenderness

## 2019-08-11 NOTE — ED PROVIDER NOTE - PROGRESS NOTE DETAILS
CT head canceled after etoh level came back at 458 since pt denies head injury and no trauma on exam. H/H high - likely 2/2 hemoconcentration from dehydration.  Labs w ag - ? 2/2 aka vs dehydration - will repeat after ivf.  pH nl so doubt aka.  Pt also w ck 336 - will repeat to eval trend after ivf.  AST/ALT elevated c/w etoh abuse. H/H high - likely 2/2 hemoconcentration from dehydration.  Labs w ag - ? 2/2 aka vs dehydration - will repeat after ivf.  pH nl so doubt aka.  Pt also w ck 336 - will repeat to eval trend after ivf.  AST/ALT elevated c/w etoh abuse.  Pt c/o feeling shaky - will give librium.  CIWA 5. Repeat labs w improved h/h, ck; ag also improving.  Pt receiving banana bag w d5.  Will discuss w pmd, Dr Pizano. Kemi: received s/o pending call back from dr. landis (covering for deo). CAlled several times, >2hrs since initial call. Will admit hospitalist for likely etoh withdrawal. Pt signed out to Dr Mcmullen and LARRY Streeter pending connection w Dr Diaz since pt now in etoh w/d.  Pt c/o shaking and given iv ativan w improved sx.  CIWA remains < 8.  AM team admitted pt to hospitalist but Dr Diaz returned call now and agrees to admit.  GARO in ed and received signout.  Bedboard contacted and will change admit to Joe.

## 2019-08-11 NOTE — H&P ADULT - PROBLEM SELECTOR PLAN 5
IMPROVE score of 0. No indication for pharmacological ppx. 1) PCP Contacted on Admission: (Y/N) --> Name & Phone #:  2) Date of Contact with PCP:  3) PCP Contacted at Discharge: (Y/N)  4) Summary of Handoff Given to PCP:   5) Post-Discharge Appointment Date and Location:

## 2019-08-11 NOTE — ED PROVIDER NOTE - CARE PLAN
Principal Discharge DX:	Alcohol abuse  Secondary Diagnosis:	Dehydration Principal Discharge DX:	Alcohol withdrawal  Secondary Diagnosis:	Dehydration

## 2019-08-11 NOTE — H&P ADULT - PROBLEM SELECTOR PLAN 2
Patient w/ history of HTN, takes amlodipine 2.5mg at home.  -c/w amlodipine 2.5mg daily Patient with thrombocytopenia, likely 2/2 alcohol use and liver dysfunction. No active signs of bleeding.  -continue to monitor CBC  -maintain active type and screen  -transfuse for platelet <10k or <50k w/ active bleeding

## 2019-08-11 NOTE — ED ADULT NURSE NOTE - NSIMPLEMENTINTERV_GEN_ALL_ED
Implemented All Fall Risk Interventions:  [place] to call system. Call bell, personal items and telephone within reach. Instruct patient to call for assistance. Room bathroom lighting operational. Non-slip footwear when patient is off stretcher. Physically safe environment: no spills, clutter or unnecessary equipment. Stretcher in lowest position, wheels locked, appropriate side rails in place. Provide visual cue, wrist band, yellow gown, etc. Monitor gait and stability. Monitor for mental status changes and reorient to person, place, and time. Review medications for side effects contributing to fall risk. Reinforce activity limits and safety measures with patient and family.

## 2019-08-11 NOTE — H&P ADULT - PROBLEM SELECTOR PLAN 3
Patient w/ elevated transaminitis, likely 2/2 alcohol. Will obtain coags to calculate discriminant factor for concerns for alcoholic hepatitis.   -f/u coags  -trend liver enzymes Patient w/ history of HTN, takes amlodipine 2.5mg at home.  -c/w amlodipine 2.5mg daily

## 2019-08-11 NOTE — H&P ADULT - NSHPPHYSICALEXAM_GEN_ALL_CORE
Vital Signs Last 12 Hrs  T(F): 98.9 (08-11-19 @ 08:43), Max: 98.9 (08-11-19 @ 04:20)  HR: 114 (08-11-19 @ 08:43) (102 - 135)  BP: 136/75 (08-11-19 @ 08:43) (125/76 - 142/76)  BP(mean): --  RR: 18 (08-11-19 @ 08:43) (18 - 22)  SpO2: 99% (08-11-19 @ 08:43) (98% - 100%)    PHYSICAL EXAM:  Constitutional: NAD, comfortable in bed.  HEENT: NC/AT, PERRLA, EOMI, no conjunctival pallor or scleral icterus, MMM  Neck: Supple, no JVD  Respiratory: Normal rate, rhythm, depth, effort. CTAB. No w/r/r.   Cardiovascular: tachy, RR, normal S1 and S2, no m/r/g.   Gastrointestinal: +BS, soft NTND, no guarding or rebound tenderness, no palpable masses   Extremities: wwp; no cyanosis, clubbing or edema. Mild tremors of UE b/l with hands extended.   Vascular: Pulses equal and strong throughout.   Neurological: AAOx3, no CN deficits, strength and sensation intact throughout.   Skin: No gross skin abnormalities or rashes

## 2019-08-12 ENCOUNTER — TRANSCRIPTION ENCOUNTER (OUTPATIENT)
Age: 44
End: 2019-08-12

## 2019-08-12 DIAGNOSIS — F10.20 ALCOHOL DEPENDENCE, UNCOMPLICATED: ICD-10-CM

## 2019-08-12 DIAGNOSIS — I10 ESSENTIAL (PRIMARY) HYPERTENSION: ICD-10-CM

## 2019-08-12 DIAGNOSIS — R07.89 OTHER CHEST PAIN: ICD-10-CM

## 2019-08-12 DIAGNOSIS — F10.230 ALCOHOL DEPENDENCE WITH WITHDRAWAL, UNCOMPLICATED: ICD-10-CM

## 2019-08-12 LAB
ANION GAP SERPL CALC-SCNC: 14 MMOL/L — SIGNIFICANT CHANGE UP (ref 5–17)
BLD GP AB SCN SERPL QL: NEGATIVE — SIGNIFICANT CHANGE UP
BUN SERPL-MCNC: 4 MG/DL — LOW (ref 7–23)
CALCIUM SERPL-MCNC: 9.4 MG/DL — SIGNIFICANT CHANGE UP (ref 8.4–10.5)
CHLORIDE SERPL-SCNC: 99 MMOL/L — SIGNIFICANT CHANGE UP (ref 96–108)
CO2 SERPL-SCNC: 26 MMOL/L — SIGNIFICANT CHANGE UP (ref 22–31)
CREAT SERPL-MCNC: 0.54 MG/DL — SIGNIFICANT CHANGE UP (ref 0.5–1.3)
GLUCOSE SERPL-MCNC: 83 MG/DL — SIGNIFICANT CHANGE UP (ref 70–99)
HCT VFR BLD CALC: 41.2 % — SIGNIFICANT CHANGE UP (ref 34.5–45)
HGB BLD-MCNC: 13.8 G/DL — SIGNIFICANT CHANGE UP (ref 11.5–15.5)
MAGNESIUM SERPL-MCNC: 1.5 MG/DL — LOW (ref 1.6–2.6)
MCHC RBC-ENTMCNC: 32.5 PG — SIGNIFICANT CHANGE UP (ref 27–34)
MCHC RBC-ENTMCNC: 33.5 GM/DL — SIGNIFICANT CHANGE UP (ref 32–36)
MCV RBC AUTO: 96.9 FL — SIGNIFICANT CHANGE UP (ref 80–100)
NRBC # BLD: 0 /100 WBCS — SIGNIFICANT CHANGE UP (ref 0–0)
PHOSPHATE SERPL-MCNC: 3.1 MG/DL — SIGNIFICANT CHANGE UP (ref 2.5–4.5)
PLATELET # BLD AUTO: 80 K/UL — LOW (ref 150–400)
POTASSIUM SERPL-MCNC: 3.4 MMOL/L — LOW (ref 3.5–5.3)
POTASSIUM SERPL-SCNC: 3.4 MMOL/L — LOW (ref 3.5–5.3)
RBC # BLD: 4.25 M/UL — SIGNIFICANT CHANGE UP (ref 3.8–5.2)
RBC # FLD: 12.3 % — SIGNIFICANT CHANGE UP (ref 10.3–14.5)
RH IG SCN BLD-IMP: POSITIVE — SIGNIFICANT CHANGE UP
SODIUM SERPL-SCNC: 139 MMOL/L — SIGNIFICANT CHANGE UP (ref 135–145)
TROPONIN T SERPL-MCNC: <0.01 NG/ML — SIGNIFICANT CHANGE UP (ref 0–0.01)
WBC # BLD: 5.92 K/UL — SIGNIFICANT CHANGE UP (ref 3.8–10.5)
WBC # FLD AUTO: 5.92 K/UL — SIGNIFICANT CHANGE UP (ref 3.8–10.5)

## 2019-08-12 PROCEDURE — 93010 ELECTROCARDIOGRAM REPORT: CPT

## 2019-08-12 PROCEDURE — 99223 1ST HOSP IP/OBS HIGH 75: CPT

## 2019-08-12 PROCEDURE — 93010 ELECTROCARDIOGRAM REPORT: CPT | Mod: 76,77

## 2019-08-12 RX ORDER — POTASSIUM CHLORIDE 20 MEQ
40 PACKET (EA) ORAL ONCE
Refills: 0 | Status: COMPLETED | OUTPATIENT
Start: 2019-08-12 | End: 2019-08-12

## 2019-08-12 RX ORDER — NALTREXONE HYDROCHLORIDE 50 MG/1
50 TABLET, FILM COATED ORAL DAILY
Refills: 0 | Status: DISCONTINUED | OUTPATIENT
Start: 2019-08-12 | End: 2019-08-16

## 2019-08-12 RX ORDER — MAGNESIUM SULFATE 500 MG/ML
2 VIAL (ML) INJECTION ONCE
Refills: 0 | Status: COMPLETED | OUTPATIENT
Start: 2019-08-12 | End: 2019-08-12

## 2019-08-12 RX ADMIN — Medication 50 GRAM(S): at 11:32

## 2019-08-12 RX ADMIN — Medication 1 MILLIGRAM(S): at 11:31

## 2019-08-12 RX ADMIN — Medication 100 MILLIGRAM(S): at 11:31

## 2019-08-12 RX ADMIN — Medication 50 MILLIGRAM(S): at 04:06

## 2019-08-12 RX ADMIN — Medication 50 MILLIGRAM(S): at 19:42

## 2019-08-12 RX ADMIN — Medication 50 MILLIGRAM(S): at 11:31

## 2019-08-12 RX ADMIN — AMLODIPINE BESYLATE 2.5 MILLIGRAM(S): 2.5 TABLET ORAL at 05:16

## 2019-08-12 RX ADMIN — Medication 2 MILLIGRAM(S): at 23:44

## 2019-08-12 RX ADMIN — Medication 2 MILLIGRAM(S): at 11:32

## 2019-08-12 RX ADMIN — NALTREXONE HYDROCHLORIDE 50 MILLIGRAM(S): 50 TABLET, FILM COATED ORAL at 14:01

## 2019-08-12 RX ADMIN — Medication 1 TABLET(S): at 11:31

## 2019-08-12 RX ADMIN — Medication 40 MILLIEQUIVALENT(S): at 11:31

## 2019-08-12 NOTE — BEHAVIORAL HEALTH ASSESSMENT NOTE - HPI (INCLUDE ILLNESS QUALITY, SEVERITY, DURATION, TIMING, CONTEXT, MODIFYING FACTORS, ASSOCIATED SIGNS AND SYMPTOMS)
Patient is a 42 yo  F, unemployed, domiciled alone in an [facility] apartment, PMH of alcohol abuse and HTN who presents with alcohol withdrawal.  brought in to ED by her boyfriend who found her laying on the floor in her apartment, intoxicated. Psychiatry was consulted for management of alcohol dependency.    Patient reports that she was brought in by her boyfriend after he found her passed out after drinking a couple of liters of rum. She cannot identify any recent stressors for her drinking. Since she stopped taking naltrexone a few weeks ago she has been drinking daily. She reports that her drinking helps her with her boredom after she lost her job and with her stress of not being  or having a family at her age. She has a difficult time identifying negative consequences of drinking but in the end states that it has been affecting her relationships and she is afraid that her boyfriend will leave her if she stops.  She denies any recent mood symptoms. She denies any SI/HI. She reports that she has been drinking heavily for 8 years until in January, after she was hospitalized she decided to see a therapist and join an outpatient substance abuse program. She did not find therapy useful but states that naltrexone helped her stay sober for a while. She also found her outpatient substance abuse program very useful and would like to restart attending it.

## 2019-08-12 NOTE — PROGRESS NOTE ADULT - PROBLEM SELECTOR PLAN 4
Nutrition, metabolism, and development symptoms.    F: 80 mL/hr NS  E: replete K <4, Mg <2  N: DASH diet  Last BM: 8/10  VTE: Padua score <4, SCDs ordered  GI PPx: Not indicated  Code: Full

## 2019-08-12 NOTE — BEHAVIORAL HEALTH ASSESSMENT NOTE - SUMMARY
Plan:  -continue treatment for alcohol withdrawal with librium TID and CIWA; folate, thiamine, MVI  -start naltrexone 50gm po daily for alcohol dependence  -psychoeducation and motivational interviewing provided  -patient to return to her outpatient substance abuse program [facility] Patient is a 44 yo  F, unemployed, domiciled alone in an [facility] apartment, PMH of alcohol abuse and HTN who presents with alcohol withdrawal.  brought in to ED by her boyfriend who found her laying on the floor in her apartment, intoxicated. Psychiatry was consulted for management of alcohol dependency. Patient presents with severe alcohol dependence; she presents with partial insight into her dependency. She reports motivation to restart attending an outpatient substance abuse program.     Plan:  -continue treatment for alcohol withdrawal with librium TID and CIWA; folate, thiamine, MVI  -start naltrexone 50gm po daily for alcohol dependence  -psychoeducation and motivational interviewing provided  -patient to return to her outpatient substance abuse program [facility]

## 2019-08-12 NOTE — BEHAVIORAL HEALTH ASSESSMENT NOTE - NSBHCHARTREVIEWLAB_PSY_A_CORE FT
CBC Full  -  ( 12 Aug 2019 06:12 )  WBC Count : 5.92 K/uL  RBC Count : 4.25 M/uL  Hemoglobin : 13.8 g/dL  Hematocrit : 41.2 %  Platelet Count - Automated : 80 K/uL  Mean Cell Volume : 96.9 fl  Mean Cell Hemoglobin : 32.5 pg  Mean Cell Hemoglobin Concentration : 33.5 gm/dL  Auto Neutrophil # : x  Auto Lymphocyte # : x  Auto Monocyte # : x  Auto Eosinophil # : x  Auto Basophil # : x  Auto Neutrophil % : x  Auto Lymphocyte % : x  Auto Monocyte % : x  Auto Eosinophil % : x  Auto Basophil % : x  08-12    139  |  99  |  4<L>  ----------------------------<  83  3.4<L>   |  26  |  0.54    Ca    9.4      12 Aug 2019 06:12  Phos  3.1     08-12  Mg     1.5     08-12    TPro  6.7  /  Alb  4.3  /  TBili  1.1  /  DBili  x   /  AST  181<H>  /  ALT  165<H>  /  AlkPhos  63  08-11

## 2019-08-12 NOTE — DISCHARGE NOTE PROVIDER - CARE PROVIDER_API CALL
Kaleb Pizano)  Medicine  [address] [place] [address]  Phone: [phone]  Fax: [phone]  Follow Up Time:     Jordyn Ordaz  Phone: [phone]  Fax: (   )    -  Follow Up Time:

## 2019-08-12 NOTE — BEHAVIORAL HEALTH ASSESSMENT NOTE - RISK ASSESSMENT
low risk: no prior history of mood d/o/SI/SA  Has supportive family and friends; reports somewhat motivation for treatment

## 2019-08-12 NOTE — DISCHARGE NOTE PROVIDER - HOSPITAL COURSE
Patient is 44 yo lady with medical history of HTN and alcohol use disorder who presented with diaphoresis and tremors, found to have alcohol withdrawal.         Problem List/Main Diagnoses (system-based):         PSYCH:    #Alcohol Withdrawal: Patient started Librium while inpatient to control symptoms of alcohol withdrawal. Tremors were the main symptoms that gradually improved during stay. CIWA never >5        #Alcohol Use Disorder: Patient reported that over the last 2 weeks she had returned to drinking after several months of abstinence on Naltrexone. The psych team was consulted and she was restarted on her Naltrexone.         CARDIOVASCULAR:    #HTN- No changes made to BP regimen. She was normotensive throughout hospitalization.             New medications: Restarted on Naltrexone 50mg    Labs to be followed outpatient: none    Exam to be followed outpatient: none Patient is 42 yo lady with medical history of HTN and alcohol use disorder who presented with diaphoresis and tremors, found to have alcohol withdrawal.         Problem List/Main Diagnoses (system-based):         PSYCH:    #Alcohol Withdrawal: Patient started Librium 50mg q8, and tapered off while inpatient to control symptoms of alcohol withdrawal. Tremors were the main symptoms that gradually improved during stay. CIWA never >5.        #Alcohol Use Disorder: Patient reported that over the last 2 weeks she had returned to drinking after several months of abstinence on Naltrexone. The psych team was consulted and she was restarted on her Naltrexone. Patient plans on returning to her outpatient substance abuse program.        CARDIOVASCULAR:    #HTN- No changes made to BP regimen. She was normotensive throughout hospitalization.             New medications: Restarted on Naltrexone 50mg    Labs to be followed outpatient: none    Exam to be followed outpatient: none Patient is 42 yo lady with medical history of HTN and alcohol use disorder who presented with diaphoresis and tremors, found to have alcohol withdrawal.         Problem List/Main Diagnoses (system-based):         PSYCH:    #Alcohol Withdrawal: Patient started Librium 50mg q8, and tapered off while inpatient to control symptoms of alcohol withdrawal. Tremors were the main symptoms that gradually improved during stay. CIWA never >5.        #Alcohol Use Disorder: Patient reported that over the last 2 weeks she had returned to drinking after several months of abstinence on Naltrexone. The psych team was consulted and she was restarted on her Naltrexone. Patient also started on gabapentin for Anxiety associated with alcohol use disorder. Patient plans on returning to her outpatient substance abuse program.        CARDIOVASCULAR:    #HTN- No changes made to BP regimen. She was normotensive throughout hospitalization.             New medications: Restarted on Naltrexone 50mg, Gabapentin 100mg BID    Labs to be followed outpatient: none    Exam to be followed outpatient: none

## 2019-08-12 NOTE — BEHAVIORAL HEALTH ASSESSMENT NOTE - NSBHCHARTREVIEWVS_PSY_A_CORE FT
ICU Vital Signs Last 24 Hrs  T(C): 36.9 (12 Aug 2019 10:25), Max: 37.4 (11 Aug 2019 17:00)  T(F): 98.5 (12 Aug 2019 10:25), Max: 99.3 (11 Aug 2019 17:00)  HR: 124 (12 Aug 2019 10:25) (106 - 129)  BP: 138/93 (12 Aug 2019 10:25) (119/78 - 146/96)  BP(mean): --  ABP: --  ABP(mean): --  RR: 18 (12 Aug 2019 10:25) (17 - 19)  SpO2: 99% (12 Aug 2019 10:25) (95% - 99%)

## 2019-08-12 NOTE — PROGRESS NOTE ADULT - PROBLEM SELECTOR PLAN 1
Patient with reported 8 year history of alcohol use disorder. She was drinking ~1pint Patient with reported 8 year history of alcohol use disorder. She was drinking ~1pint/day, but in January 2019 started on naltrexone and stopped drinking. 2 weeks ago she stopped taking it and started drinking again. She could not identify any triggers, but she did lose her job 7/18.  -CIWA 5 (Tremor and sweat) 8/12 AM  -Continue Librium 50 mg q8 and Ativan 2mg PRN  -Continue Folic acid and multivitamin  -Psych Consulted- Start naltrexone 50mg  - Patient with reported 8 year history of alcohol use disorder. She was drinking ~1pint/day, but in January 2019 started on naltrexone and stopped drinking. 2 weeks ago she stopped taking it and started drinking again. She could not identify any triggers, but she did lose her job 7/18.  -CIWA 5 (Tremor and sweat) 8/12 AM  -Continue Librium 50 mg q8 and Ativan 2mg PRN  -Continue Folic acid and multivitamin  -Psych Consulted- Start naltrexone 50mg  -Patient reports Jordyn Ordaz is outpatient therapist [phone]- contacted, waiting for call back

## 2019-08-12 NOTE — DISCHARGE NOTE PROVIDER - PROVIDER TOKENS
PROVIDER:[TOKEN:[[id number]]],FREE:[LAST:[Sushma],FIRST:[Jordyn],PHONE:[phone]],FAX:[(   )    -]]

## 2019-08-12 NOTE — PROGRESS NOTE ADULT - PROBLEM SELECTOR PLAN 3
Patient with new onset substernal chest pain, improved with stretching. EKG ordered showed TWave inversion in V1-V5. Low concern for MI, but important to rule out.  -Troponin negative  -Repeat EKG

## 2019-08-12 NOTE — DISCHARGE NOTE PROVIDER - NSDCCPCAREPLAN_GEN_ALL_CORE_FT
PRINCIPAL DISCHARGE DIAGNOSIS  Diagnosis: Alcohol withdrawal  Assessment and Plan of Treatment: You      SECONDARY DISCHARGE DIAGNOSES  Diagnosis: Dehydration  Assessment and Plan of Treatment: PRINCIPAL DISCHARGE DIAGNOSIS  Diagnosis: Alcohol withdrawal  Assessment and Plan of Treatment: You presented sweating and with tremors, 2 signs of alcohol withdrawal. While in the hospital you were started on a medication to help you with those symptoms which helped. It was discontinued before you left. Since you were previously seen by a therapist, the psychiatry team came to see you and recommended restarting your naltrexone. It is also recommended that you continue taking a multivitamin, folate supplement, and thiamine supplement. If after you leave you begin to expereince worseing tremors, start to halluciante, or feel extremely anxious/agitated please seek medical attention. Be sure to follow up with Dr. Pizano and with Dr. Ordaz.      SECONDARY DISCHARGE DIAGNOSES  Diagnosis: Dehydration  Assessment and Plan of Treatment: PRINCIPAL DISCHARGE DIAGNOSIS  Diagnosis: Alcohol withdrawal  Assessment and Plan of Treatment: You presented sweating and with tremors, 2 signs of alcohol withdrawal. While in the hospital you were started on a medication to help you with those symptoms which helped. It was discontinued before you left. Since you were previously seen by a therapist, the psychiatry team came to see you and recommended restarting your naltrexone. It is also recommended that you continue taking a multivitamin, folate supplement, and thiamine supplement. If after you leave you begin to expereince worseing tremors, start to halluciante, or feel extremely anxious/agitated please seek medical attention. Be sure to follow up with Dr. Pizano and with Dr. Ordaz.      SECONDARY DISCHARGE DIAGNOSES  Diagnosis: Alcohol use disorder, severe, dependence  Assessment and Plan of Treatment: You have a history of drinking large amounts of alcohol on a regular basis.  It was recommended that you restart your Naltrexone to help you with this. Be sure to follow up with Dr. Ordaz and your outpatient substance abuse program. If you think you need additional support please call our Psychiatry team.    Diagnosis: Dehydration  Assessment and Plan of Treatment: PRINCIPAL DISCHARGE DIAGNOSIS  Diagnosis: Alcohol withdrawal  Assessment and Plan of Treatment: You presented sweating and with tremors, 2 signs of alcohol withdrawal. While in the hospital you were started on a medication to help you with those symptoms which helped. It was discontinued before you left. Since you were previously seen by a therapist, the psychiatry team came to see you and recommended restarting your naltrexone. It is also recommended that you continue taking a multivitamin, folate supplement, and thiamine supplement. If after you leave you begin to expereince worseing tremors, start to halluciante, or feel extremely anxious/agitated please seek medical attention. Be sure to follow up with Dr. Pizano and with Dr. Ordaz.      SECONDARY DISCHARGE DIAGNOSES  Diagnosis: Alcohol use disorder, severe, dependence  Assessment and Plan of Treatment: You have a history of drinking large amounts of alcohol on a regular basis.  It was recommended that you restart your Naltrexone to help you with this. Additionally, you were started on Gabapentin for your anxiety. Continue to take this medication 2 times a day. Be sure to follow up with Dr. Ordaz and your outpatient substance abuse program. If you think you need additional support please call our Psychiatry team.    Diagnosis: Dehydration  Assessment and Plan of Treatment: PRINCIPAL DISCHARGE DIAGNOSIS  Diagnosis: Alcohol withdrawal  Assessment and Plan of Treatment: You presented sweating and with tremors, 2 signs of alcohol withdrawal. While in the hospital you were started on a medication to help you with those symptoms which helped. It was discontinued before you left. Since you were previously seen by a therapist, the psychiatry team came to see you and recommended restarting your naltrexone. It is also recommended that you continue taking a multivitamin, folate supplement, and thiamine supplement. If after you leave you begin to expereince worseing tremors, start to halluciante, or feel extremely anxious/agitated please seek medical attention. Be sure to follow up with Dr. Pizano and with Dr. Ordaz.      SECONDARY DISCHARGE DIAGNOSES  Diagnosis: Alcohol use disorder, severe, dependence  Assessment and Plan of Treatment: You have a history of drinking large amounts of alcohol on a regular basis.  It was recommended that you restart your Naltrexone to help you with this. Additionally, you were started on Gabapentin for your anxiety. Continue to take this medication 2 times a day. If you get too drowsy during the day, just take 100mg at night then add the morning dose after a few days. We also discussed joining AA. If you need help finding a meeting for you, please call and we will help you. Be sure to follow up with Dr. Ordaz and your outpatient substance abuse program. If you think you need additional support please call our Psychiatry team.    Diagnosis: Dehydration  Assessment and Plan of Treatment:

## 2019-08-13 LAB
ANION GAP SERPL CALC-SCNC: 11 MMOL/L — SIGNIFICANT CHANGE UP (ref 5–17)
BUN SERPL-MCNC: 8 MG/DL — SIGNIFICANT CHANGE UP (ref 7–23)
CALCIUM SERPL-MCNC: 9.6 MG/DL — SIGNIFICANT CHANGE UP (ref 8.4–10.5)
CHLORIDE SERPL-SCNC: 100 MMOL/L — SIGNIFICANT CHANGE UP (ref 96–108)
CO2 SERPL-SCNC: 29 MMOL/L — SIGNIFICANT CHANGE UP (ref 22–31)
CREAT SERPL-MCNC: 0.58 MG/DL — SIGNIFICANT CHANGE UP (ref 0.5–1.3)
GLUCOSE SERPL-MCNC: 89 MG/DL — SIGNIFICANT CHANGE UP (ref 70–99)
MAGNESIUM SERPL-MCNC: 2.2 MG/DL — SIGNIFICANT CHANGE UP (ref 1.6–2.6)
POTASSIUM SERPL-MCNC: 3.8 MMOL/L — SIGNIFICANT CHANGE UP (ref 3.5–5.3)
POTASSIUM SERPL-SCNC: 3.8 MMOL/L — SIGNIFICANT CHANGE UP (ref 3.5–5.3)
SODIUM SERPL-SCNC: 140 MMOL/L — SIGNIFICANT CHANGE UP (ref 135–145)

## 2019-08-13 PROCEDURE — 99231 SBSQ HOSP IP/OBS SF/LOW 25: CPT

## 2019-08-13 RX ORDER — SENNA PLUS 8.6 MG/1
1 TABLET ORAL AT BEDTIME
Refills: 0 | Status: DISCONTINUED | OUTPATIENT
Start: 2019-08-13 | End: 2019-08-16

## 2019-08-13 RX ADMIN — NALTREXONE HYDROCHLORIDE 50 MILLIGRAM(S): 50 TABLET, FILM COATED ORAL at 09:02

## 2019-08-13 RX ADMIN — Medication 50 MILLIGRAM(S): at 11:04

## 2019-08-13 RX ADMIN — AMLODIPINE BESYLATE 2.5 MILLIGRAM(S): 2.5 TABLET ORAL at 05:42

## 2019-08-13 RX ADMIN — Medication 50 MILLIGRAM(S): at 05:42

## 2019-08-13 RX ADMIN — Medication 1 TABLET(S): at 11:04

## 2019-08-13 RX ADMIN — Medication 50 MILLIGRAM(S): at 19:00

## 2019-08-13 RX ADMIN — SENNA PLUS 1 TABLET(S): 8.6 TABLET ORAL at 22:52

## 2019-08-13 RX ADMIN — Medication 100 MILLIGRAM(S): at 11:04

## 2019-08-13 RX ADMIN — Medication 1 MILLIGRAM(S): at 11:04

## 2019-08-13 NOTE — PROGRESS NOTE BEHAVIORAL HEALTH - NSBHCHARTREVIEWLAB_PSY_A_CORE FT
CBC Full  -  ( 12 Aug 2019 06:12 )  WBC Count : 5.92 K/uL  RBC Count : 4.25 M/uL  Hemoglobin : 13.8 g/dL  Hematocrit : 41.2 %  Platelet Count - Automated : 80 K/uL  Mean Cell Volume : 96.9 fl  Mean Cell Hemoglobin : 32.5 pg  Mean Cell Hemoglobin Concentration : 33.5 gm/dL  Auto Neutrophil # : x  Auto Lymphocyte # : x  Auto Monocyte # : x  Auto Eosinophil # : x  Auto Basophil # : x  Auto Neutrophil % : x  Auto Lymphocyte % : x  Auto Monocyte % : x  Auto Eosinophil % : x  Auto Basophil % : x  08-13    140  |  100  |  8   ----------------------------<  89  3.8   |  29  |  0.58    Ca    9.6      13 Aug 2019 07:05  Phos  3.1     08-12  Mg     2.2     08-13

## 2019-08-13 NOTE — PROGRESS NOTE ADULT - SUBJECTIVE AND OBJECTIVE BOX
OVERNIGHT EVENTS: No acute events overnight reported by patient or staff. Patient required her PRN ativan    SUBJECTIVE / INTERVAL HPI: Patient seen and examined at bedside. Patient denies headache, fever, chills, chest pain, SOB, abdominal pain, Nausea/Vomiting, or numbness/tingling.      .  VITAL SIGNS:  T(C): 36.6 (19 @ 05:18), Max: 37.1 (19 @ 11:45)  T(F): 97.9 (19 @ 05:18), Max: 98.7 (19 @ 11:45)  HR: 101 (19 @ 05:18) (98 - 126)  BP: 122/86 (19 @ 05:18) (108/75 - 138/93)  BP(mean): --  RR: 17 (19 @ 05:18) (16 - 18)  SpO2: 100% (19 05:18) (96% - 100%)  Wt(kg): --    PHYSICAL EXAM:  Constitutional: NAD, Anxious in appearance  Head: NC/AT  Eyes: EOMI, anicteric sclera  ENT: no nasal discharge  Neck: supple  Respiratory: CTA b/l, no increased work of breathing  Cardiac: +S1/S2, Tachycardic, regular rhythm	  Gastrointestinal: soft, NT/ND; no rebound or guarding; +BS  Extremities: WWP, no peripheral edema, 2+ pulses Radial and DP  Dermatologic: skin warm  Neurologic: Alert and oriented, no focal deficits appreciated. Patient with tremor at rest and with arms extended  Psychiatric: affect and characteristics of appearance, verbalizations, behaviors are appropriate        19 @ 07:01  -  19 @ 07:00  --------------------------------------------------------  IN: 880 mL / OUT: 50 mL / NET: 830 mL        MEDICATIONS:  MEDICATIONS  (STANDING):  amLODIPine   Tablet 2.5 milliGRAM(s) Oral daily  chlordiazePOXIDE 50 milliGRAM(s) Oral every 8 hours  folic acid 1 milliGRAM(s) Oral daily  multivitamin 1 Tablet(s) Oral daily  naltrexone 50 milliGRAM(s) Oral daily  sodium chloride 0.9%. 1000 milliLiter(s) (80 mL/Hr) IV Continuous <Continuous>  thiamine 100 milliGRAM(s) Oral daily    MEDICATIONS  (PRN):  LORazepam   Injectable 2 milliGRAM(s) IV Push every 2 hours PRN CIWA-Ar score 8 or greater      ALLERGIES:  Allergies    predniSONE (Rash)  Tamiflu (Other)    Intolerances        Pertinent LABS, RADIOLOGY, MICROBIOLOGY, and ADDITIONAL TESTS reviewed:   .  LABS:                         13.8   5.92  )-----------( 80       ( 12 Aug 2019 06:12 )             41.2     08-12    139  |  99  |  4<L>  ----------------------------<  83  3.4<L>   |  26  |  0.54    Ca    9.4      12 Aug 2019 06:12  Phos  3.1       Mg     1.5         TPro  6.7  /  Alb  4.3  /  TBili  1.1  /  DBili  x   /  AST  181<H>  /  ALT  165<H>  /  AlkPhos  63  08-11    PT/INR - ( 11 Aug 2019 10:52 )   PT: 11.4 sec;   INR: 1.01          PTT - ( 11 Aug 2019 10:52 )  PTT:30.1 sec  Urinalysis Basic - ( 11 Aug 2019 07:28 )    Color: Yellow / Appearance: Clear / S.015 / pH: x  Gluc: x / Ketone: Trace mg/dL  / Bili: Negative / Urobili: 0.2 E.U./dL   Blood: x / Protein: NEGATIVE mg/dL / Nitrite: NEGATIVE   Leuk Esterase: NEGATIVE / RBC: x / WBC x   Sq Epi: x / Non Sq Epi: x / Bacteria: x      CARDIAC MARKERS ( 12 Aug 2019 06:12 )  x     / <0.01 ng/mL / x     / x     / x                RADIOLOGY, EKG & ADDITIONAL TESTS: No new imaging. OVERNIGHT EVENTS: No acute events overnight reported by patient or staff. Patient required her PRN ativan one time.     SUBJECTIVE / INTERVAL HPI: Patient seen and examined at bedside. She reports that she is feeling better, and that her tremors are improved. Does complain of alternations between being hot and cold. Also feels embarrassed of her recent actions. Patient denies headache, chest pain, SOB, abdominal pain, Nausea/Vomiting, or numbness/tingling.      .  VITAL SIGNS:  T(C): 36.6 (19 @ 05:18), Max: 37.1 (19 @ 11:45)  T(F): 97.9 (19 @ :18), Max: 98.7 (19 @ 11:45)  HR: 101 (19 @ 05:18) (98 - 126)  BP: 122/86 (19 @ 05:18) (108/75 - 138/93)  BP(mean): --  RR: 17 (19 @ 05:18) (16 - 18)  SpO2: 100% (19 @ 05:18) (96% - 100%)  Wt(kg): --    PHYSICAL EXAM:  Constitutional: NAD, Anxious in appearance  Head: NC/AT  Eyes: EOMI, anicteric sclera  ENT: no nasal discharge  Neck: supple  Respiratory: CTA b/l, no increased work of breathing  Cardiac: +S1/S2, Tachycardic, regular rhythm	  Gastrointestinal: soft, NT/ND; no rebound or guarding; +BS  Extremities: WWP, no peripheral edema, 2+ pulses Radial and DP  Dermatologic: skin warm  Neurologic: Alert and oriented, no focal deficits appreciated. Patient with tremor at rest and with arms extended (L>R)  Psychiatric: affect and characteristics of appearance, verbalizations, behaviors are appropriate        19 @ 07:01  -  19 @ 07:00  --------------------------------------------------------  IN: 880 mL / OUT: 50 mL / NET: 830 mL        MEDICATIONS:  MEDICATIONS  (STANDING):  amLODIPine   Tablet 2.5 milliGRAM(s) Oral daily  chlordiazePOXIDE 50 milliGRAM(s) Oral every 8 hours  folic acid 1 milliGRAM(s) Oral daily  multivitamin 1 Tablet(s) Oral daily  naltrexone 50 milliGRAM(s) Oral daily  sodium chloride 0.9%. 1000 milliLiter(s) (80 mL/Hr) IV Continuous <Continuous>  thiamine 100 milliGRAM(s) Oral daily    MEDICATIONS  (PRN):  LORazepam   Injectable 2 milliGRAM(s) IV Push every 2 hours PRN CIWA-Ar score 8 or greater      ALLERGIES:  Allergies    predniSONE (Rash)  Tamiflu (Other)    Intolerances        Pertinent LABS, RADIOLOGY, MICROBIOLOGY, and ADDITIONAL TESTS reviewed:   .  LABS:                         13.8   5.92  )-----------( 80       ( 12 Aug 2019 06:12 )             41.2     08    139  |  99  |  4<L>  ----------------------------<  83  3.4<L>   |  26  |  0.54    Ca    9.4      12 Aug 2019 06:12  Phos  3.1       Mg     1.5         TPro  6.7  /  Alb  4.3  /  TBili  1.1  /  DBili  x   /  AST  181<H>  /  ALT  165<H>  /  AlkPhos  63  08-11    PT/INR - ( 11 Aug 2019 10:52 )   PT: 11.4 sec;   INR: 1.01          PTT - ( 11 Aug 2019 10:52 )  PTT:30.1 sec  Urinalysis Basic - ( 11 Aug 2019 07:28 )    Color: Yellow / Appearance: Clear / S.015 / pH: x  Gluc: x / Ketone: Trace mg/dL  / Bili: Negative / Urobili: 0.2 E.U./dL   Blood: x / Protein: NEGATIVE mg/dL / Nitrite: NEGATIVE   Leuk Esterase: NEGATIVE / RBC: x / WBC x   Sq Epi: x / Non Sq Epi: x / Bacteria: x      CARDIAC MARKERS ( 12 Aug 2019 06:12 )  x     / <0.01 ng/mL / x     / x     / x                RADIOLOGY, EKG & ADDITIONAL TESTS: No new imaging.

## 2019-08-13 NOTE — PROGRESS NOTE ADULT - PROBLEM SELECTOR PLAN 4
Nutrition, metabolism, and development symptoms.    F: 80 mL/hr NS  E: replete K <4, Mg <2  N: DASH diet  Last BM: 8/10  VTE: Padua score <4, SCDs ordered  GI PPx: Not indicated  Code: Full Nutrition, metabolism, and development symptoms.    F: None  E: replete K <4, Mg <2  N: DASH diet  Last BM: 8/10- senna ordered  VTE: Padua score <4, SCDs ordered  GI PPx: Not indicated  Code: Full

## 2019-08-13 NOTE — PROGRESS NOTE BEHAVIORAL HEALTH - SUMMARY
Patient is a 42 yo  F, unemployed, domiciled alone in an [facility] apartment, PMH of alcohol abuse and HTN who presents with alcohol withdrawal.  brought in to ED by her boyfriend who found her laying on the floor in her apartment, intoxicated. Psychiatry was consulted for management of alcohol dependency. Patient presents with severe alcohol dependence; she presents with partial insight into her dependency. She reports motivation to restart attending an outpatient substance abuse program.     Plan:  -continue treatment for alcohol withdrawal with librium TID and CIWA; folate, thiamine, MVI  -continue naltrexone 50gm po daily for alcohol dependence  -psychoeducation and motivational interviewing provided  -patient to return to her outpatient substance abuse program [facility]

## 2019-08-13 NOTE — PROGRESS NOTE ADULT - PROBLEM SELECTOR PLAN 3
Patient with new onset substernal chest pain, improved with stretching. EKG ordered showed TWave inversion in V1-V5. Low concern for MI, but important to rule out.  -Troponin negative  -Repeat EKG Resolved: Patient with new onset substernal chest pain, improved with stretching. EKG ordered showed TWave inversion in V1-V5. Low concern for MI, but important to rule out.  -Troponin negative  -Repeat EKG stable

## 2019-08-13 NOTE — PROGRESS NOTE ADULT - PROBLEM SELECTOR PLAN 1
Patient with reported 8 year history of alcohol use disorder. She was drinking ~1pint/day, but in January 2019 started on naltrexone and stopped drinking. 2 weeks ago she stopped taking it and started drinking again. She could not identify any triggers, but she did lose her job 7/18.  -CIWA 5 (Tremor and sweat) 8/12 AM  -Continue Librium 50 mg q8 and Ativan 2mg PRN  -Continue Folic acid and multivitamin  -Psych Consulted- Start naltrexone 50mg  -Patient reports Jordyn Ordaz is outpatient therapist [phone]- contacted, waiting for call back Patient with reported 8 year history of alcohol use disorder. She was drinking ~1pint/day, but in January 2019 started on naltrexone and stopped drinking. 2 weeks ago she stopped taking it and started drinking again. She could not identify any triggers, but she did lose her job 7/18.  -CIWA 3 (Tremor) 8/13 AM  -Continue Librium 50 mg q8 and Ativan 2mg PRN- consider decreasing frequency tomorrow  -Continue Folic acid and multivitamin  -Psych Consulted- Continue naltrexone 50mg (patient reports she has 90 day supply at home and will not need prescription)-> patient would like to continue seeing psych team  -Per Dr. Ordaz : Patient comes inconsistently, every 2-3 weeks rather than recommended weekly. She reports pretty consistent relapses, but that patient does well when taking the medication. Confirms patient was fired, but was rehired per [employer] because her company really values her.

## 2019-08-14 LAB
ANION GAP SERPL CALC-SCNC: 13 MMOL/L — SIGNIFICANT CHANGE UP (ref 5–17)
BUN SERPL-MCNC: 9 MG/DL — SIGNIFICANT CHANGE UP (ref 7–23)
CALCIUM SERPL-MCNC: 9.5 MG/DL — SIGNIFICANT CHANGE UP (ref 8.4–10.5)
CHLORIDE SERPL-SCNC: 99 MMOL/L — SIGNIFICANT CHANGE UP (ref 96–108)
CO2 SERPL-SCNC: 26 MMOL/L — SIGNIFICANT CHANGE UP (ref 22–31)
CREAT SERPL-MCNC: 0.64 MG/DL — SIGNIFICANT CHANGE UP (ref 0.5–1.3)
EXTRA LAVENDER TOP TUBE: SIGNIFICANT CHANGE UP
GLUCOSE SERPL-MCNC: 162 MG/DL — HIGH (ref 70–99)
MAGNESIUM SERPL-MCNC: 1.8 MG/DL — SIGNIFICANT CHANGE UP (ref 1.6–2.6)
POTASSIUM SERPL-MCNC: 3.8 MMOL/L — SIGNIFICANT CHANGE UP (ref 3.5–5.3)
POTASSIUM SERPL-SCNC: 3.8 MMOL/L — SIGNIFICANT CHANGE UP (ref 3.5–5.3)
SODIUM SERPL-SCNC: 138 MMOL/L — SIGNIFICANT CHANGE UP (ref 135–145)

## 2019-08-14 RX ORDER — LANOLIN ALCOHOL/MO/W.PET/CERES
5 CREAM (GRAM) TOPICAL AT BEDTIME
Refills: 0 | Status: DISCONTINUED | OUTPATIENT
Start: 2019-08-14 | End: 2019-08-16

## 2019-08-14 RX ORDER — DOCUSATE SODIUM 100 MG
100 CAPSULE ORAL DAILY
Refills: 0 | Status: DISCONTINUED | OUTPATIENT
Start: 2019-08-14 | End: 2019-08-16

## 2019-08-14 RX ADMIN — Medication 50 MILLIGRAM(S): at 16:19

## 2019-08-14 RX ADMIN — NALTREXONE HYDROCHLORIDE 50 MILLIGRAM(S): 50 TABLET, FILM COATED ORAL at 09:26

## 2019-08-14 RX ADMIN — Medication 1 MILLIGRAM(S): at 12:12

## 2019-08-14 RX ADMIN — SENNA PLUS 1 TABLET(S): 8.6 TABLET ORAL at 21:50

## 2019-08-14 RX ADMIN — AMLODIPINE BESYLATE 2.5 MILLIGRAM(S): 2.5 TABLET ORAL at 05:30

## 2019-08-14 RX ADMIN — Medication 5 MILLIGRAM(S): at 22:38

## 2019-08-14 RX ADMIN — Medication 50 MILLIGRAM(S): at 05:30

## 2019-08-14 RX ADMIN — Medication 1 TABLET(S): at 12:12

## 2019-08-14 RX ADMIN — Medication 100 MILLIGRAM(S): at 12:12

## 2019-08-14 NOTE — SBIRT NOTE ADULT - NSSBIRTUNABLESCROTHER_GEN_A_CORE
Patient stated she already answered questions about her substance abuse with the medical doctors and the psychiatrist and wanted to rest.

## 2019-08-14 NOTE — PROGRESS NOTE ADULT - PROBLEM SELECTOR PLAN 1
Patient with reported 8 year history of alcohol use disorder. She was drinking ~1pint/day, but in January 2019 started on naltrexone and stopped drinking. 2 weeks ago she stopped taking it and started drinking again. She could not identify any triggers, but she did lose her job 7/18.  -CIWA 3 (Tremor) 8/13 AM  -Continue Librium 50 mg q8 and Ativan 2mg PRN- consider decreasing frequency tomorrow  -Continue Folic acid and multivitamin  -Psych Consulted- Continue naltrexone 50mg (patient reports she has 90 day supply at home and will not need prescription)-> patient would like to continue seeing psych team  -Per Dr. Ordaz : Patient comes inconsistently, every 2-3 weeks rather than recommended weekly. She reports pretty consistent relapses, but that patient does well when taking the medication. Confirms patient was fired, but was rehired per [employer] because her company really values her. Patient with reported 8 year history of alcohol use disorder. She was drinking ~1pint/day, but in January 2019 started on naltrexone and stopped drinking. 2 weeks ago she stopped taking it and started drinking again. She could not identify any triggers, but she did lose her job 7/18.  -CIWA 1 (Tremor) 8/14 AM  -Change Librium 50 mg q8 to q12 and Ativan 2mg PRN  -Continue Folic acid and multivitamin  -Psych Consulted- Continue naltrexone 50mg (patient reports she has 90 day supply at home and will not need prescription)-> patient would like to continue seeing psych team  -Per Dr. Ordaz : Patient comes inconsistently, every 2-3 weeks rather than recommended weekly. She reports pretty consistent relapses, but that patient does well when taking the medication. Confirms patient was fired, but was rehired per [employer] because her company really values her.

## 2019-08-14 NOTE — PROGRESS NOTE ADULT - SUBJECTIVE AND OBJECTIVE BOX
OVERNIGHT EVENTS: No acute events overnight reported by patient or staff. No PRN ativan needed overnight.    SUBJECTIVE / INTERVAL HPI: Patient seen and examined at bedside. Patient denies headache, fever, chills, chest pain, SOB, abdominal pain, Nausea/Vomiting, or numbness/tingling.      .  VITAL SIGNS:  T(C): 37 (08-14-19 @ 05:24), Max: 37.1 (08-13-19 @ 14:42)  T(F): 98.6 (08-14-19 @ 05:24), Max: 98.7 (08-13-19 @ 14:42)  HR: 100 (08-14-19 @ 05:24) (100 - 124)  BP: 108/75 (08-14-19 @ 05:24) (100/70 - 118/73)  BP(mean): --  RR: 19 (08-14-19 @ 05:24) (16 - 19)  SpO2: 98% (08-14-19 @ 05:24) (97% - 99%)  Wt(kg): --    PHYSICAL EXAM:  Constitutional: NAD, Anxious in appearance  Head: NC/AT  Eyes: EOMI, anicteric sclera  ENT: no nasal discharge  Neck: supple  Respiratory: CTA b/l, no increased work of breathing  Cardiac: +S1/S2, Tachycardic, regular rhythm	  Gastrointestinal: soft, NT/ND; no rebound or guarding; +BS  Extremities: WWP, no peripheral edema, 2+ pulses Radial and DP  Dermatologic: skin warm  Neurologic: Alert and oriented, no focal deficits appreciated. Patient with tremor at rest and with arms extended (L>R)  Psychiatric: affect and characteristics of appearance, verbalizations, behaviors are appropriate            MEDICATIONS:  MEDICATIONS  (STANDING):  amLODIPine   Tablet 2.5 milliGRAM(s) Oral daily  chlordiazePOXIDE 50 milliGRAM(s) Oral every 8 hours  folic acid 1 milliGRAM(s) Oral daily  multivitamin 1 Tablet(s) Oral daily  naltrexone 50 milliGRAM(s) Oral daily  senna 1 Tablet(s) Oral at bedtime  thiamine 100 milliGRAM(s) Oral daily    MEDICATIONS  (PRN):  LORazepam   Injectable 2 milliGRAM(s) IV Push every 2 hours PRN CIWA-Ar score 8 or greater      ALLERGIES:  Allergies    predniSONE (Rash)  Tamiflu (Other)    Intolerances        Pertinent LABS, RADIOLOGY, MICROBIOLOGY, and ADDITIONAL TESTS reviewed:   .  LABS:                         13.8   5.92  )-----------( 80       ( 12 Aug 2019 06:12 )             41.2     08-13    140  |  100  |  8   ----------------------------<  89  3.8   |  29  |  0.58    Ca    9.6      13 Aug 2019 07:05  Phos  3.1     08-12  Mg     2.2     08-13          CARDIAC MARKERS ( 12 Aug 2019 06:12 )  x     / <0.01 ng/mL / x     / x     / x                RADIOLOGY, EKG & ADDITIONAL TESTS: Reviewed. OVERNIGHT EVENTS: No acute events overnight reported by patient or staff. No PRN ativan needed overnight.    SUBJECTIVE / INTERVAL HPI: Patient seen and examined at bedside. She reports she is improved this morning and in better spirits. Smiles throughout conversation. Explained she does not have decreased appetite but tends to eat smaller meals throughout the day. Patient denies headache, fever, chills, chest pain, SOB, abdominal pain, Nausea/Vomiting, or numbness/tingling.      .  VITAL SIGNS:  T(C): 37 (08-14-19 @ 05:24), Max: 37.1 (08-13-19 @ 14:42)  T(F): 98.6 (08-14-19 @ 05:24), Max: 98.7 (08-13-19 @ 14:42)  HR: 100 (08-14-19 @ 05:24) (100 - 124)  BP: 108/75 (08-14-19 @ 05:24) (100/70 - 118/73)  BP(mean): --  RR: 19 (08-14-19 @ 05:24) (16 - 19)  SpO2: 98% (08-14-19 @ 05:24) (97% - 99%)  Wt(kg): --    PHYSICAL EXAM:  Constitutional: NAD, Anxious in appearance  Head: NC/AT  Eyes: EOMI, anicteric sclera  ENT: no nasal discharge  Neck: supple  Respiratory: CTA b/l, no increased work of breathing  Cardiac: +S1/S2, Tachycardic, regular rhythm	  Gastrointestinal: soft, NT/ND; no rebound or guarding; +BS  Extremities: WWP, no peripheral edema, 2+ pulses Radial and DP  Dermatologic: skin warm  Neurologic: Alert and oriented, no focal deficits appreciated. Patient tremor improved, very mild  Psychiatric: affect and characteristics of appearance, verbalizations, behaviors are appropriate        MEDICATIONS:  MEDICATIONS  (STANDING):  amLODIPine   Tablet 2.5 milliGRAM(s) Oral daily  chlordiazePOXIDE 50 milliGRAM(s) Oral every 8 hours  folic acid 1 milliGRAM(s) Oral daily  multivitamin 1 Tablet(s) Oral daily  naltrexone 50 milliGRAM(s) Oral daily  senna 1 Tablet(s) Oral at bedtime  thiamine 100 milliGRAM(s) Oral daily    MEDICATIONS  (PRN):  LORazepam   Injectable 2 milliGRAM(s) IV Push every 2 hours PRN CIWA-Ar score 8 or greater      ALLERGIES:  Allergies    predniSONE (Rash)  Tamiflu (Other)      Pertinent LABS, RADIOLOGY, MICROBIOLOGY, and ADDITIONAL TESTS reviewed:   .  LABS:                         13.8   5.92  )-----------( 80       ( 12 Aug 2019 06:12 )             41.2     08-13    140  |  100  |  8   ----------------------------<  89  3.8   |  29  |  0.58    Ca    9.6      13 Aug 2019 07:05  Phos  3.1     08-12  Mg     2.2     08-13          CARDIAC MARKERS ( 12 Aug 2019 06:12 )  x     / <0.01 ng/mL / x     / x     / x                RADIOLOGY, EKG & ADDITIONAL TESTS: No new imaging.

## 2019-08-14 NOTE — CONSULT NOTE ADULT - SUBJECTIVE AND OBJECTIVE BOX
Patient is a 43y old  Female who presents with a chief complaint of ETOH withdrawal (14 Aug 2019 05:58)       HPI:  Patient is a 42 yo F w/ PMHx of HTN, alcohol abuse who presents with alcohol withdrawal. The patient's boyfriend was unable to reach the patient via text or phone so he went to her apartment and found her lying on the floor, so he brought her to the ED. She denies falling/head trauma/LOC and states that she just felt more comfortable lying on the floor. Her last drink was yesterday between 6-8pm. The patient states that she has been drinking about 0.5L rum a day for eight years, history of withdrawal but no seizures/ICU/intubation. Has been trying to quit on and off since January of this year. Two weeks ago she reports she stopped taking Naltrexone because she felt it wasn't helping decrease her alcohol use. She additionally reports she hasn't eaten anything other than a few bananas over the past week due to decreased appetite but has continued drinking alcohol. Otherwise denies fever, chills, CP, palpitations, SOB, cough, N/V/D/C, abdominal pain, dysuria, LE edema.     In ED, Vitals were T 97.8F, , /77, RR 18, O2 sat 99% on RA. Labs s/f Hgb 16.6, Plt 129, Anion gap of 22, Prot 8.9 Alb 5.5, , , , Serum alcohol level of 458. Utox was negative, UA was negative except for trace ketones. EKG showed sinus tachycardia. The patient was given Calcium gluconate 2g IV x1, Librium 50mg x1, Ativan 1mg x1, 2L NS and a banana bag. The patient was admitted to medicine for further management of alcohol w/d. (11 Aug 2019 08:50)      PAST MEDICAL & SURGICAL HISTORY:  Hypertension  Alcohol abuse  S/P wrist surgery      MEDICATIONS  (STANDING):  amLODIPine   Tablet 2.5 milliGRAM(s) Oral daily  chlordiazePOXIDE 50 milliGRAM(s) Oral once  docusate sodium 100 milliGRAM(s) Oral daily  folic acid 1 milliGRAM(s) Oral daily  multivitamin 1 Tablet(s) Oral daily  naltrexone 50 milliGRAM(s) Oral daily  senna 1 Tablet(s) Oral at bedtime  thiamine 100 milliGRAM(s) Oral daily    MEDICATIONS  (PRN):  LORazepam   Injectable 2 milliGRAM(s) IV Push every 2 hours PRN CIWA-Ar score 8 or greater      Social History per patient:             -  (- )  tobacco,  (- )   IVDA,  (- )  iliicit drug use,  (1/2 L of rumj )  alcohol           - Occupation: unemployed           - Home Living Status: lives alone in an elevator accessible apartment building           - Home Care Services:  X    Functional Level Prior to Admission per patient:                     - ADL's: independent           - ambulates without assistive devices    FAMILY HISTORY:  No family history of cardiovascular disease (Father)  Family history of osteoporosis (Mother)  Family history of Parkinson disease (Mother)          08-14    138  |  99  |  9   ----------------------------<  162<H>  3.8   |  26  |  0.64    Ca    9.5      14 Aug 2019 06:22  Mg     1.8     08-14              Radiology:              Vital Signs Last 24 Hrs  T(C): 37 (14 Aug 2019 05:24), Max: 37.1 (13 Aug 2019 14:42)  T(F): 98.6 (14 Aug 2019 05:24), Max: 98.7 (13 Aug 2019 14:42)  HR: 100 (14 Aug 2019 05:24) (100 - 124)  BP: 108/75 (14 Aug 2019 05:24) (100/70 - 118/73)  BP(mean): --  RR: 19 (14 Aug 2019 05:24) (16 - 19)  SpO2: 98% (14 Aug 2019 05:24) (97% - 99%)    REVIEW OF SYSTEMS:    CONSTITUTIONAL: No fever, weight loss, or fatigue  EYES: No eye pain, visual disturbances, or discharge  ENMT:  No difficulty hearing, tinnitus, vertigo; No sinus or throat pain  NECK: No pain or stiffness  BREASTS: No pain, masses, or nipple discharge  RESPIRATORY: No cough, wheezing, chills or hemoptysis; No shortness of breath  CARDIOVASCULAR: No chest pain, palpitations, dizziness, or leg swelling  GASTROINTESTINAL: No abdominal or epigastric pain. No nausea, vomiting, or hematemesis; No diarrhea or constipation. No melena or hematochezia.  GENITOURINARY: No dysuria, frequency, hematuria, or incontinence  NEUROLOGICAL: No headaches, memory loss, loss of strength, numbness, or tremors  SKIN: No itching, burning, rashes, or lesions   LYMPH NODES: No enlarged glands  ENDOCRINE: No heat or cold intolerance; No hair loss  MUSCULOSKELETAL: No joint pain or swelling; No muscle, back, or extremity pain  PSYCHIATRIC: No depression, anxiety, mood swings, or difficulty sleeping  HEME/LYMPH: No easy bruising, or bleeding gums  ALLERGY AND IMMUNOLOGIC: No hives or eczema  VASCULAR: no swelling, erythema      Physical Exam: 42 yo  woman lying in semi Arreola's position, "feels better"    Head: normocephalic, atraumatic    Eyes: PERRLA, EOMI, no nystagmus, sclera anicteric    ENT: nasal discharge, uvula midline, no oropharyngeal erythema/exudate    Neck: supple, negative JVD, negative carotid bruits, no thyromegaly    Chest: CTA bilaterally, neg wheeze/ rhonchi/ rales/ crackles/ egophany    Cardiovascular: regular rate and rhythm, neg murmurs/rubs/gallops    Abdomen: soft, non distended, non tender, negative rebound/guarding, normal bowel sounds, neg hepatosplenomegaly    Extremities: WWP, neg cyanosis/clubbing/edema, negative calf tenderness to palpation, negative Alva's sign, bilateral resting hand tremors    :     Neurologic Exam:    Alert and oriented to person, place, date/year, speech fluent w/o dysarthria, recent and remote memory intact, repetition intact, comprehension intact    Cranial Nerves:     II:                       pupils equal, round and reactive to light, visual fields intact   III/ IV/VI:             extraocular movements intact, neg nystagmus, neg ptosis  V:                       facial sensation intact, V1-3 normal  VII:                     face symmetric, no droop, normal eye closure and smile  VIII:                    hearing intact to finger rub bilaterally  IX/ X:                 soft palate rise symmetrical  XI:                      head turning, shoulder shrug normal  XII:                     tongue midline    Motor Exam:    Upper Extremities:     RIght:     5/5   /intrinsics               5/5  wrist flexors/extensors               5/5  biceps/triceps               5/5  deltoid               negative drift    Left :     5/5  /intrinsics               5/5  wrist flexors/extensors               5/5 biceps/triceps               5/5 deltoid               negative drift    Lower Extremities:                 Right:      5/5  DF/PF/ evertors/ invertors                5/5  Quad/ hamstrings                5/5  hip flexors/adductors/abductors                 Left:       5/5  DF/PF/ evertors/ invertors                5/5  Quad/ hamstrings                5/5  hip flexors/adductors/abductors                       Sensory:    intact to LT/PP in all UE/LE dermatomes    DTR:            = biceps/     triceps/     brachioradialis                      = patella/   medial hamstring/ankle                      neg clonus                      neg Babinski                        Finger to Nose:  wnl    Heel to Shin:  wnl    Rapid Alternating movements:  wnl    Joint Position Sense:  intact    Romberg:  not tested    Tandem Walking:  not tested    Gait:  not tested        PM&R Impression:    1) Etoh withdrawal  2) no focal neuro deficits        Recommendations:    1) Physical therapy focusing on therapeutic exercises, bed mobility/transfer out of bed evaluation, progressive ambulation with assistive devices prn.    2) Anticipated Disposition Plan/Recs: d/c home with no post discharge rehab needs

## 2019-08-14 NOTE — PHYSICAL THERAPY INITIAL EVALUATION ADULT - PERTINENT HX OF CURRENT PROBLEM, REHAB EVAL
43F who presents with alcohol withdrawal. The patient's boyfriend was unable to reach the patient via text or phone so he went to her apartment and found her lying on the floor, so he brought her to the ED. She denies falling/head trauma/LOC and states that she just felt more comfortable lying on the floor. Her last drink was yesterday between 6-8pm. The patient states that she has been drinking about 0.5L rum a day

## 2019-08-14 NOTE — PROGRESS NOTE ADULT - PROBLEM SELECTOR PLAN 4
Nutrition, metabolism, and development symptoms.    F: None  E: replete K <4, Mg <2  N: DASH diet  Last BM: 8/10- senna ordered  VTE: Padua score <4, SCDs ordered  GI PPx: Not indicated  Code: Full Nutrition, metabolism, and development symptoms.    F: None  E: replete K <4, Mg <2  N: DASH diet  Last BM: 8/10- passing gas-Continue senna, start docusate  VTE: Padua score <4, SCDs ordered  GI PPx: Not indicated  Code: Full

## 2019-08-14 NOTE — PHYSICAL THERAPY INITIAL EVALUATION ADULT - GAIT DEVIATIONS NOTED, PT EVAL
initial 4 steps navigating through obstacles unsteady, 1 moderate LOB posteriorly to L however able to recover independently, no SOB

## 2019-08-14 NOTE — PROGRESS NOTE ADULT - PROBLEM SELECTOR PLAN 3
Resolved: Patient with new onset substernal chest pain, improved with stretching. EKG ordered showed TWave inversion in V1-V5. Low concern for MI, but important to rule out.  -Troponin negative  -Repeat EKG stable

## 2019-08-14 NOTE — PHYSICAL THERAPY INITIAL EVALUATION ADULT - GENERAL OBSERVATIONS, REHAB EVAL
Received supine in NAD, denies pain +IV hep. Left seated at EOB in NAD +RN Chanell aware, call goode

## 2019-08-14 NOTE — CONSULT NOTE ADULT - ASSESSMENT
Patient is a 44 yo F w/ MHx of HTN and alcohol use disorder who presents with alcohol withdrawal.    Problem/Plan - 1:  ·  Problem: Alcohol withdrawal syndrome without complication.  Plan: Patient with reported 8 year history of alcohol use disorder. She was drinking ~1pint/day, but in January 2019 started on naltrexone and stopped drinking. 2 weeks ago she stopped taking it and started drinking again. She could not identify any triggers, but she did lose her job 7/18.  -CIWA 3 (Tremor) 8/13 AM  -Continue Librium 50 mg q8 and Ativan 2mg PRN- consider decreasing frequency tomorrow  -Continue Folic acid and multivitamin  -Psych Consulted- Continue naltrexone 50mg (patient reports she has 90 day supply at home and will not need prescription)-> patient would like to continue seeing psych team  -Per Dr. Ordaz : Patient comes inconsistently, every 2-3 weeks rather than recommended weekly. She reports pretty consistent relapses, but that patient does well when taking the medication. Confirms patient was fired, but was rehired per [employer] because her company really values her.     Problem/Plan - 2:  ·  Problem: HTN (hypertension).  Plan: -Continue with home amlodipine 2.5mg daily.     Problem/Plan - 3:  ·  Problem: Chest pain of unknown etiology.  Plan: Resolved: Patient with new onset substernal chest pain, improved with stretching. EKG ordered showed TWave inversion in V1-V5. Low concern for MI, but important to rule out.  -Troponin negative  -Repeat EKG stable.     Problem/Plan - 4:  ·  Problem: Nutrition, metabolism, and development symptoms.  Plan: Nutrition, metabolism, and development symptoms.    F: None  E: replete K <4, Mg <2  N: DASH diet  Last BM: 8/10- senna ordered  VTE: Padua score <4, SCDs ordered  GI PPx: Not indicated  Code: Full.

## 2019-08-15 DIAGNOSIS — F41.9 ANXIETY DISORDER, UNSPECIFIED: ICD-10-CM

## 2019-08-15 PROCEDURE — 99233 SBSQ HOSP IP/OBS HIGH 50: CPT

## 2019-08-15 RX ORDER — ZOLPIDEM TARTRATE 10 MG/1
5 TABLET ORAL AT BEDTIME
Refills: 0 | Status: DISCONTINUED | OUTPATIENT
Start: 2019-08-15 | End: 2019-08-15

## 2019-08-15 RX ORDER — GABAPENTIN 400 MG/1
100 CAPSULE ORAL
Refills: 0 | Status: DISCONTINUED | OUTPATIENT
Start: 2019-08-15 | End: 2019-08-16

## 2019-08-15 RX ORDER — AMLODIPINE BESYLATE 2.5 MG/1
1 TABLET ORAL
Qty: 0 | Refills: 0 | DISCHARGE
Start: 2019-08-15

## 2019-08-15 RX ORDER — AMLODIPINE BESYLATE 2.5 MG/1
2.5 TABLET ORAL
Qty: 0 | Refills: 0 | DISCHARGE

## 2019-08-15 RX ADMIN — Medication 100 MILLIGRAM(S): at 12:00

## 2019-08-15 RX ADMIN — Medication 1 TABLET(S): at 12:00

## 2019-08-15 RX ADMIN — SENNA PLUS 1 TABLET(S): 8.6 TABLET ORAL at 21:44

## 2019-08-15 RX ADMIN — NALTREXONE HYDROCHLORIDE 50 MILLIGRAM(S): 50 TABLET, FILM COATED ORAL at 09:01

## 2019-08-15 RX ADMIN — GABAPENTIN 100 MILLIGRAM(S): 400 CAPSULE ORAL at 17:57

## 2019-08-15 RX ADMIN — Medication 5 MILLIGRAM(S): at 21:44

## 2019-08-15 RX ADMIN — Medication 1 MILLIGRAM(S): at 12:00

## 2019-08-15 RX ADMIN — Medication 25 MILLIGRAM(S): at 11:59

## 2019-08-15 RX ADMIN — AMLODIPINE BESYLATE 2.5 MILLIGRAM(S): 2.5 TABLET ORAL at 06:08

## 2019-08-15 RX ADMIN — Medication 100 MILLIGRAM(S): at 11:59

## 2019-08-15 NOTE — PROGRESS NOTE ADULT - SUBJECTIVE AND OBJECTIVE BOX
PT. seen and examined at bed side.    Case discussed with medical team.    Case discussed with Consultants.    Orders reviewed.    Plan:as per orders.    Full note to follow.

## 2019-08-15 NOTE — PROGRESS NOTE ADULT - SUBJECTIVE AND OBJECTIVE BOX
Physical Medicine and Rehabilitation Progress Note:    Patient is a 43y old  Female who presents with a chief complaint of ETOH withdrawal (15 Aug 2019 11:55)      HPI:  Patient is a 42 yo F w/ PMHx of HTN, alcohol abuse who presents with alcohol withdrawal. The patient's boyfriend was unable to reach the patient via text or phone so he went to her apartment and found her lying on the floor, so he brought her to the ED. She denies falling/head trauma/LOC and states that she just felt more comfortable lying on the floor. Her last drink was yesterday between 6-8pm. The patient states that she has been drinking about 0.5L rum a day for eight years, history of withdrawal but no seizures/ICU/intubation. Has been trying to quit on and off since January of this year. Two weeks ago she reports she stopped taking Naltrexone because she felt it wasn't helping decrease her alcohol use. She additionally reports she hasn't eaten anything other than a few bananas over the past week due to decreased appetite but has continued drinking alcohol. Otherwise denies fever, chills, CP, palpitations, SOB, cough, N/V/D/C, abdominal pain, dysuria, LE edema.     In ED, Vitals were T 97.8F, , /77, RR 18, O2 sat 99% on RA. Labs s/f Hgb 16.6, Plt 129, Anion gap of 22, Prot 8.9 Alb 5.5, , , , Serum alcohol level of 458. Utox was negative, UA was negative except for trace ketones. EKG showed sinus tachycardia. The patient was given Calcium gluconate 2g IV x1, Librium 50mg x1, Ativan 1mg x1, 2L NS and a banana bag. The patient was admitted to medicine for further management of alcohol w/d. (11 Aug 2019 08:50)          08-14    138  |  99  |  9   ----------------------------<  162<H>  3.8   |  26  |  0.64    Ca    9.5      14 Aug 2019 06:22  Mg     1.8     08-14      Vital Signs Last 24 Hrs  T(C): 36.7 (15 Aug 2019 14:00), Max: 36.9 (15 Aug 2019 05:20)  T(F): 98 (15 Aug 2019 14:00), Max: 98.4 (15 Aug 2019 05:20)  HR: 103 (15 Aug 2019 14:00) (82 - 103)  BP: 120/84 (15 Aug 2019 14:00) (109/75 - 120/84)  BP(mean): --  RR: 17 (15 Aug 2019 14:00) (17 - 19)  SpO2: 99% (15 Aug 2019 14:00) (99% - 100%)    MEDICATIONS  (STANDING):  amLODIPine   Tablet 2.5 milliGRAM(s) Oral daily  docusate sodium 100 milliGRAM(s) Oral daily  folic acid 1 milliGRAM(s) Oral daily  gabapentin 100 milliGRAM(s) Oral two times a day  melatonin 5 milliGRAM(s) Oral at bedtime  multivitamin 1 Tablet(s) Oral daily  naltrexone 50 milliGRAM(s) Oral daily  senna 1 Tablet(s) Oral at bedtime  thiamine 100 milliGRAM(s) Oral daily    MEDICATIONS  (PRN):    Currently Undergoing Physical Therapy at bedside.    Functional Status Assessment:      Previous Level of Function:     · Ambulation Skills	independent; no use of AD	  · Transfer Skills	independent	  · ADL Skills	independent	  · Work/Leisure Activity	independent	  · Additional Comments	independent prior to arrival, 2nd floor walk up. 1 fall that brought patient to ED in past year	    Cognitive Status Examination:   · Orientation	oriented to person, place, time and situation	  · Level of Consciousness	alert	  · Follows Commands and Answers Questions	100% of the time	  · Personal Safety and Judgment	intact	  · Short Term Memory	intact	  · Long Term Memory	intact	    Range of Motion Exam:   · Range of Motion Examination	no ROM deficits were identified	  · Active Range of Motion Examination	no Active ROM deficits were identified	    Manual Muscle Testing:   · Manual Muscle Testing Results	grossly assessed due to  functional observation against gravity > 3/5 MMT	    Bed Mobility: Rolling/Turning:     · Level of Daniels	independent	    Bed Mobility: Scooting/Bridging:     · Level of Daniels	independent	    Bed Mobility: Supine to Sit:     · Level of Daniels	independent	    Transfer: Sit to Stand:     · Level of Daniels	independent	    Transfer: Stand to Sit:     · Level of Daniels	independent	    Gait Skills:     · Level of Daniels	independent	  · Gait Distance	150 feet; FIM7	    Gait Analysis:     · Gait Deviations Noted	initial 4 steps navigating through obstacles unsteady, 1 moderate LOB posteriorly to L however able to recover independently, no SOB	    Stair Negotiation:     · Level of Daniels	independent	  · Assistive Device	right rail up; left rail down	  · Stair Pattern	step over step; FIM6	  · Number of Stairs	12	    Balance Skills Assessment:     · Sitting Balance: Static	good balance	  · Sitting Balance: Dynamic	good balance	  · Sit-to-Stand Balance	good balance	  · Standing Balance: Static	good balance	  · Standing Balance: Dynamic	good balance	        PM&R Impression: as above    Disposition Plan Recommendations: d/c home with no post discharge rehab needs

## 2019-08-15 NOTE — PROGRESS NOTE ADULT - PROBLEM SELECTOR PLAN 4
Nutrition, metabolism, and development symptoms.    F: None  E: replete K <4, Mg <2  N: DASH diet  Last BM: 8/15- Continue senna & docusate  VTE: Padua score <4, SCDs ordered  GI PPx: Not indicated  Code: Full

## 2019-08-15 NOTE — PROGRESS NOTE BEHAVIORAL HEALTH - RISK ASSESSMENT
low risk: no prior history of mood d/o/SI/SA  Has supportive family and friends; reports somewhat motivation for treatment
low risk: no prior history of mood d/o/SI/SA  Has supportive family and friends; reports somewhat motivation for treatment

## 2019-08-15 NOTE — PROGRESS NOTE ADULT - PROBLEM SELECTOR PLAN 2
-Continue with home amlodipine 2.5mg daily

## 2019-08-15 NOTE — PROGRESS NOTE BEHAVIORAL HEALTH - NSBHFUPINTERVALHXFT_PSY_A_CORE
Patient seen at bedside. Limited interview due to patient feeling tired (received librium). Has been on the standing librium with lorazepam prn with good effect (minimal withdrawal sx today). Mood improved significantly. No SI./HI. No changes in cognition.
Patient seen and interviewed at bedside. Patient finished the librium taper today, however she continues to report having withdrawal symptoms (tremors, anxiety). Her Bp and pulse are WNL. She reports that she does not feel ready to leave the hospital today and feels concerned about her gait. She endorses anxiety about going back home and facing her family (plans to go to her apartment to clean it and then to go to visit her family in [facility] for a few weeks). She does not want to try inpatient rehab at this time and would prefer to return to her outpatient program. Denies any other mood symptoms, Si/.HI.

## 2019-08-15 NOTE — PROGRESS NOTE ADULT - PROBLEM SELECTOR PLAN 1
Patient with reported 8 year history of alcohol use disorder. She was drinking ~1pint/day, but in January 2019 started on naltrexone and stopped drinking. 2 weeks ago she stopped taking it and started drinking again. She could not identify any triggers, but she did lose her job 7/18.  -CIWA 0 (Tremor) 8/15 AM   -D/C standing librium  -D/C Ativan 2mg  -Continue Folic acid and multivitamin  -Psych Consulted- Continue naltrexone 50mg (patient reports she has 90 day supply at home and will not need prescription)-> encourage patient to return to [facility] outpatient  -Per Dr. Ordaz : Patient comes inconsistently, every 2-3 weeks rather than recommended weekly. She reports pretty consistent relapses, but that patient does well when taking the medication. Confirms patient was fired, but was rehired per [place] because her company really values her.

## 2019-08-15 NOTE — PROGRESS NOTE BEHAVIORAL HEALTH - NSBHADMITCOUNSEL_PSY_A_CORE
diagnostic results/impressions and/or recommended studies
diagnostic results/impressions and/or recommended studies

## 2019-08-15 NOTE — PROGRESS NOTE ADULT - SUBJECTIVE AND OBJECTIVE BOX
OVERNIGHT EVENTS: No acute events overnight reported by patient or staff    SUBJECTIVE / INTERVAL HPI: Patient seen and examined at bedside. She reports that she is doing better. Had a small bowel movement. Smiled and laughed during exam. Still feel a little unstable while walking. Patient denies headache, fever, chills, chest pain, SOB, abdominal pain, Nausea/Vomiting, or numbness/tingling.      .  VITAL SIGNS:  T(C): 36.9 (08-15-19 @ 05:20), Max: 36.9 (08-15-19 @ 05:20)  T(F): 98.4 (08-15-19 @ 05:20), Max: 98.4 (08-15-19 @ 05:20)  HR: 82 (08-15-19 @ 05:20) (82 - 95)  BP: 109/75 (08-15-19 @ 05:20) (109/75 - 117/84)  BP(mean): --  RR: 19 (08-15-19 @ 05:20) (18 - 19)  SpO2: 99% (08-15-19 @ 05:20) (97% - 100%)  Wt(kg): --    PHYSICAL EXAM:  Constitutional: NAD, Anxious in appearance  Head: NC/AT  Eyes: EOMI, anicteric sclera  ENT: no nasal discharge  Neck: supple  Respiratory: CTA b/l, no increased work of breathing  Cardiac: +S1/S2, Tachycardic, regular rhythm	  Gastrointestinal: soft, NT/ND; no rebound or guarding; +BS  Extremities: WWP, no peripheral edema, 2+ pulses Radial and DP  Dermatologic: skin warm  Neurologic: Alert and oriented, no focal deficits appreciated. Patient tremor improved, very mild  Psychiatric: affect and characteristics of appearance, verbalizations, behaviors are appropriate          MEDICATIONS:  MEDICATIONS  (STANDING):  amLODIPine   Tablet 2.5 milliGRAM(s) Oral daily  chlordiazePOXIDE 25 milliGRAM(s) Oral once  docusate sodium 100 milliGRAM(s) Oral daily  folic acid 1 milliGRAM(s) Oral daily  melatonin 5 milliGRAM(s) Oral at bedtime  multivitamin 1 Tablet(s) Oral daily  naltrexone 50 milliGRAM(s) Oral daily  senna 1 Tablet(s) Oral at bedtime  thiamine 100 milliGRAM(s) Oral daily    MEDICATIONS  (PRN):      ALLERGIES:  Allergies    predniSONE (Rash)  Tamiflu (Other)    Intolerances        Pertinent LABS, RADIOLOGY, MICROBIOLOGY, and ADDITIONAL TESTS reviewed:   .  LABS:     08-14    138  |  99  |  9   ----------------------------<  162<H>  3.8   |  26  |  0.64    Ca    9.5      14 Aug 2019 06:22  Mg     1.8     08-14                    RADIOLOGY, EKG & ADDITIONAL TESTS: No new imaging.

## 2019-08-15 NOTE — PROGRESS NOTE BEHAVIORAL HEALTH - SUMMARY
Patient is a 42 yo  F, unemployed, domiciled alone in an [facility] apartment, PMH of alcohol abuse and HTN who presents with alcohol withdrawal.  brought in to ED by her boyfriend who found her laying on the floor in her apartment, intoxicated. Psychiatry was consulted for management of alcohol dependency. Patient presents with improved symptoms. She reports motivation to restart attending an outpatient substance abuse program.     Plan:    -continue naltrexone 50gm po daily for alcohol dependence; please provide patient with a 1 week prescription at discharge  -psychoeducation and motivational interviewing provided  -patient to return to her outpatient substance abuse program [facility] Patient is a 44 yo  F, unemployed, domiciled alone in an [facility] apartment, PMH of alcohol abuse and HTN who presents with alcohol withdrawal.  brought in to ED by her boyfriend who found her laying on the floor in her apartment, intoxicated. Psychiatry was consulted for management of alcohol dependency. Patient presents with improved withdrawal symptoms, however she endorses anxiety. She is not currently interested in an inpatient substance abuse program but reports motivation to restart attending her outpatient  program.     Plan:    -continue naltrexone 50gm po daily for alcohol dependence (does not require a prescription, has 1 month supply at home)  -start gabapentin 100mg po bid for treatment of anxiety sec to alcohol use d/o   -psychoeducation and motivational interviewing provided  -patient to return to her outpatient substance abuse program [facility]

## 2019-08-15 NOTE — PROGRESS NOTE BEHAVIORAL HEALTH - NSBHCHARTREVIEWVS_PSY_A_CORE FT
ICU Vital Signs Last 24 Hrs  T(C): 36.7 (13 Aug 2019 11:11), Max: 36.9 (12 Aug 2019 15:02)  T(F): 98 (13 Aug 2019 11:11), Max: 98.4 (12 Aug 2019 15:02)  HR: 123 (13 Aug 2019 11:11) (101 - 126)  BP: 115/82 (13 Aug 2019 11:11) (115/82 - 131/86)  BP(mean): --  ABP: --  ABP(mean): --  RR: 16 (13 Aug 2019 11:11) (16 - 18)  SpO2: 99% (13 Aug 2019 11:11) (96% - 100%)
Vital Signs Last 24 Hrs  T(C): 36.9 (15 Aug 2019 05:20), Max: 36.9 (15 Aug 2019 05:20)  T(F): 98.4 (15 Aug 2019 05:20), Max: 98.4 (15 Aug 2019 05:20)  HR: 82 (15 Aug 2019 05:20) (82 - 93)  BP: 109/75 (15 Aug 2019 05:20) (109/75 - 112/79)  BP(mean): --  RR: 19 (15 Aug 2019 05:20) (18 - 19)  SpO2: 99% (15 Aug 2019 05:20) (99% - 100%)

## 2019-08-16 VITALS
HEART RATE: 83 BPM | TEMPERATURE: 98 F | RESPIRATION RATE: 20 BRPM | SYSTOLIC BLOOD PRESSURE: 110 MMHG | OXYGEN SATURATION: 99 % | DIASTOLIC BLOOD PRESSURE: 75 MMHG

## 2019-08-16 PROCEDURE — 83735 ASSAY OF MAGNESIUM: CPT

## 2019-08-16 PROCEDURE — 96375 TX/PRO/DX INJ NEW DRUG ADDON: CPT

## 2019-08-16 PROCEDURE — 80053 COMPREHEN METABOLIC PANEL: CPT

## 2019-08-16 PROCEDURE — 36415 COLL VENOUS BLD VENIPUNCTURE: CPT

## 2019-08-16 PROCEDURE — 81003 URINALYSIS AUTO W/O SCOPE: CPT

## 2019-08-16 PROCEDURE — 81025 URINE PREGNANCY TEST: CPT

## 2019-08-16 PROCEDURE — 84100 ASSAY OF PHOSPHORUS: CPT

## 2019-08-16 PROCEDURE — 97161 PT EVAL LOW COMPLEX 20 MIN: CPT

## 2019-08-16 PROCEDURE — 85730 THROMBOPLASTIN TIME PARTIAL: CPT

## 2019-08-16 PROCEDURE — 80048 BASIC METABOLIC PNL TOTAL CA: CPT

## 2019-08-16 PROCEDURE — 93005 ELECTROCARDIOGRAM TRACING: CPT

## 2019-08-16 PROCEDURE — 86901 BLOOD TYPING SEROLOGIC RH(D): CPT

## 2019-08-16 PROCEDURE — 86850 RBC ANTIBODY SCREEN: CPT

## 2019-08-16 PROCEDURE — 96365 THER/PROPH/DIAG IV INF INIT: CPT

## 2019-08-16 PROCEDURE — 85610 PROTHROMBIN TIME: CPT

## 2019-08-16 PROCEDURE — 84484 ASSAY OF TROPONIN QUANT: CPT

## 2019-08-16 PROCEDURE — 85027 COMPLETE CBC AUTOMATED: CPT

## 2019-08-16 PROCEDURE — 82962 GLUCOSE BLOOD TEST: CPT

## 2019-08-16 PROCEDURE — 82550 ASSAY OF CK (CPK): CPT

## 2019-08-16 PROCEDURE — 85025 COMPLETE CBC W/AUTO DIFF WBC: CPT

## 2019-08-16 PROCEDURE — 99285 EMERGENCY DEPT VISIT HI MDM: CPT | Mod: 25

## 2019-08-16 PROCEDURE — 80307 DRUG TEST PRSMV CHEM ANLYZR: CPT

## 2019-08-16 PROCEDURE — 82803 BLOOD GASES ANY COMBINATION: CPT

## 2019-08-16 PROCEDURE — 96361 HYDRATE IV INFUSION ADD-ON: CPT

## 2019-08-16 PROCEDURE — 86900 BLOOD TYPING SEROLOGIC ABO: CPT

## 2019-08-16 RX ORDER — AMLODIPINE BESYLATE 2.5 MG/1
1 TABLET ORAL
Qty: 0 | Refills: 0 | DISCHARGE
Start: 2019-08-16

## 2019-08-16 RX ORDER — GABAPENTIN 400 MG/1
1 CAPSULE ORAL
Qty: 60 | Refills: 0
Start: 2019-08-16 | End: 2019-09-14

## 2019-08-16 RX ORDER — LANOLIN ALCOHOL/MO/W.PET/CERES
1 CREAM (GRAM) TOPICAL ONCE
Refills: 0 | Status: COMPLETED | OUTPATIENT
Start: 2019-08-16 | End: 2019-08-16

## 2019-08-16 RX ORDER — LANOLIN ALCOHOL/MO/W.PET/CERES
1 CREAM (GRAM) TOPICAL AT BEDTIME
Refills: 0 | Status: DISCONTINUED | OUTPATIENT
Start: 2019-08-16 | End: 2019-08-16

## 2019-08-16 RX ADMIN — Medication 1 MILLIGRAM(S): at 03:47

## 2019-08-16 RX ADMIN — Medication 100 MILLIGRAM(S): at 11:48

## 2019-08-16 RX ADMIN — Medication 1 TABLET(S): at 11:47

## 2019-08-16 RX ADMIN — NALTREXONE HYDROCHLORIDE 50 MILLIGRAM(S): 50 TABLET, FILM COATED ORAL at 09:27

## 2019-08-16 RX ADMIN — GABAPENTIN 100 MILLIGRAM(S): 400 CAPSULE ORAL at 05:48

## 2019-08-16 RX ADMIN — Medication 1 MILLIGRAM(S): at 11:47

## 2019-08-16 RX ADMIN — AMLODIPINE BESYLATE 2.5 MILLIGRAM(S): 2.5 TABLET ORAL at 05:48

## 2019-08-16 RX ADMIN — Medication 100 MILLIGRAM(S): at 11:47

## 2019-08-16 NOTE — PROGRESS NOTE ADULT - SUBJECTIVE AND OBJECTIVE BOX
Pt seen and examined   no complaints    REVIEW OF SYSTEMS:  Constitutional: No fever,   Cardiovascular: No chest pain, palpitations, dizziness or leg swelling  Gastrointestinal: No abdominal or epigastric pain. No nausea, vomiting or hematemesis; No diarrhea   Skin: No itching, burning, rashes or lesions       MEDICATIONS:  MEDICATIONS  (STANDING):  amLODIPine   Tablet 2.5 milliGRAM(s) Oral daily  docusate sodium 100 milliGRAM(s) Oral daily  folic acid 1 milliGRAM(s) Oral daily  gabapentin 100 milliGRAM(s) Oral two times a day  melatonin 5 milliGRAM(s) Oral at bedtime  multivitamin 1 Tablet(s) Oral daily  naltrexone 50 milliGRAM(s) Oral daily  senna 1 Tablet(s) Oral at bedtime  thiamine 100 milliGRAM(s) Oral daily    MEDICATIONS  (PRN):      Allergies    predniSONE (Rash)  Tamiflu (Other)    Intolerances        Vital Signs Last 24 Hrs  T(C): 36.6 (16 Aug 2019 05:15), Max: 36.8 (15 Aug 2019 20:50)  T(F): 97.9 (16 Aug 2019 05:15), Max: 98.2 (15 Aug 2019 20:50)  HR: 83 (16 Aug 2019 05:15) (83 - 103)  BP: 110/75 (16 Aug 2019 05:15) (110/75 - 120/84)  BP(mean): --  RR: 20 (16 Aug 2019 05:15) (17 - 20)  SpO2: 99% (16 Aug 2019 05:15) (99% - 99%)      PHYSICAL EXAM:    General:  in no acute distress, no tremulousness  HEENT: MMM, conjunctiva and sclera clear  Lungs: clear  Heart: regular  Gastrointestinal: Soft non-tender non-distended; Normal bowel sounds;   Skin: bruises receding    LABS:                                RADIOLOGY & ADDITIONAL STUDIES (The following images were personally reviewed):

## 2019-08-16 NOTE — PROGRESS NOTE ADULT - REASON FOR ADMISSION
ETOH withdrawal

## 2019-08-16 NOTE — PROGRESS NOTE ADULT - ASSESSMENT
Patient is a 42 yo F w/ MHx of HTN and alcohol use disorder who presents with alcohol withdrawal.
Patient is a 42 yo F w/ MHx of HTN and alcohol use disorder who presents with alcohol withdrawal.
Patient is a 44 yo F w/ MHx of HTN and alcohol abuse who presents with alcohol withdrawal.
Patient is a 44 yo F w/ MHx of HTN and alcohol use disorder who presents with alcohol withdrawal.    Problem/Plan - 1:  ·  Problem: Alcohol withdrawal syndrome without complication.  Plan: Patient with reported 8 year history of alcohol use disorder. She was drinking ~1pint/day, but in January 2019 started on naltrexone and stopped drinking. 2 weeks ago she stopped taking it and started drinking again. She could not identify any triggers, but she did lose her job 7/18.  -CIWA 0 (Tremor) 8/15 AM   -D/C standing librium  -D/C Ativan 2mg  -Continue Folic acid and multivitamin  -Psych Consulted- Continue naltrexone 50mg (patient reports she has 90 day supply at home and will not need prescription)-> encourage patient to return to [facility] outpatient  -Per Dr. Ordaz : Patient comes inconsistently, every 2-3 weeks rather than recommended weekly. She reports pretty consistent relapses, but that patient does well when taking the medication. Confirms patient was fired, but was rehired per [place] because her company really values her.     Problem/Plan - 2:  ·  Problem: HTN (hypertension).  Plan: -Continue with home amlodipine 2.5mg daily.     Problem/Plan - 3:  ·  Problem: Chest pain of unknown etiology.  Plan: Resolved: Patient with new onset substernal chest pain, improved with stretching. EKG ordered showed TWave inversion in V1-V5. Low concern for MI, but important to rule out.  -Troponin negative  -Repeat EKG stable.     Problem/Plan - 4:  ·  Problem: Nutrition, metabolism, and development symptoms.  Plan: Nutrition, metabolism, and development symptoms.    F: None  E: replete K <4, Mg <2  N: DASH diet  Last BM: 8/15- Continue senna & docusate  VTE: Padua score <4, SCDs ordered  GI PPx: Not indicated  Code: Full.
alcohol withdrawal  dehydration  admit: benzodiazepine, IVF hydration  fall watch
discharge home today
Patient is a 42 yo F w/ MHx of HTN and alcohol use disorder who presents with alcohol withdrawal.

## 2019-08-16 NOTE — DISCHARGE NOTE NURSING/CASE MANAGEMENT/SOCIAL WORK - NSDCFUADDAPPT_GEN_ALL_CORE_FT
Please follow up with your Primary Physician, Dr. Pizano on Sept 17 @ 1pm. If you need to see him sooner please call and ask for it to be changed    Please follow up with Dr. Ordaz. You said you wanted to make your own appointment.    Please follow up with your outpatient substance abuse program [facility].     If you need additional assistance you can make an appointment with our Psychiatry team by calling [phone].

## 2019-08-16 NOTE — PROGRESS NOTE ADULT - PROVIDER SPECIALTY LIST ADULT
Internal Medicine
Rehab Medicine
Internal Medicine

## 2019-08-16 NOTE — DISCHARGE NOTE NURSING/CASE MANAGEMENT/SOCIAL WORK - NSDCDPATPORTLINK_GEN_ALL_CORE
You can access the [facility] Patient Portal, offered by [facility], by registering with the following website: [email or web address]

## 2019-08-21 DIAGNOSIS — E86.0 DEHYDRATION: ICD-10-CM

## 2019-08-21 DIAGNOSIS — F10.239 ALCOHOL DEPENDENCE WITH WITHDRAWAL, UNSPECIFIED: ICD-10-CM

## 2019-08-21 DIAGNOSIS — F41.9 ANXIETY DISORDER, UNSPECIFIED: ICD-10-CM

## 2019-08-21 DIAGNOSIS — R07.89 OTHER CHEST PAIN: ICD-10-CM
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
## 2019-08-21 DIAGNOSIS — R74.0 NONSPECIFIC ELEVATION OF LEVELS OF TRANSAMINASE AND LACTIC ACID DEHYDROGENASE [LDH]: ICD-10-CM

## 2019-08-21 DIAGNOSIS — I10 ESSENTIAL (PRIMARY) HYPERTENSION: ICD-10-CM

## 2019-08-21 DIAGNOSIS — R00.0 TACHYCARDIA, UNSPECIFIED: ICD-10-CM

## 2019-08-21 DIAGNOSIS — D69.6 THROMBOCYTOPENIA, UNSPECIFIED: ICD-10-CM

## 2019-10-05 ENCOUNTER — INPATIENT (INPATIENT)
Facility: HOSPITAL | Age: 44
LOS: 0 days | Discharge: HOME | DRG: 897 | End: 2019-10-06
Attending: SPECIALIST | Admitting: SPECIALIST
Payer: MEDICAID

## 2019-10-05 VITALS
DIASTOLIC BLOOD PRESSURE: 96 MMHG | SYSTOLIC BLOOD PRESSURE: 137 MMHG | RESPIRATION RATE: 20 BRPM | HEART RATE: 144 BPM | HEIGHT: 66 IN | WEIGHT: 145.06 LBS | TEMPERATURE: 98 F | OXYGEN SATURATION: 97 %

## 2019-10-05 DIAGNOSIS — Z98.890 OTHER SPECIFIED POSTPROCEDURAL STATES: Chronic | ICD-10-CM

## 2019-10-05 LAB
ALBUMIN SERPL ELPH-MCNC: 5.3 G/DL — HIGH (ref 3.3–5)
ALP SERPL-CCNC: 80 U/L — SIGNIFICANT CHANGE UP (ref 40–120)
ALT FLD-CCNC: 21 U/L — SIGNIFICANT CHANGE UP (ref 10–45)
AMPHET UR-MCNC: NEGATIVE — SIGNIFICANT CHANGE UP
ANION GAP SERPL CALC-SCNC: 23 MMOL/L — HIGH (ref 5–17)
AST SERPL-CCNC: 32 U/L — SIGNIFICANT CHANGE UP (ref 10–40)
BARBITURATES UR SCN-MCNC: NEGATIVE — SIGNIFICANT CHANGE UP
BASOPHILS # BLD AUTO: 0.07 K/UL — SIGNIFICANT CHANGE UP (ref 0–0.2)
BASOPHILS NFR BLD AUTO: 0.6 % — SIGNIFICANT CHANGE UP (ref 0–2)
BENZODIAZ UR-MCNC: NEGATIVE — SIGNIFICANT CHANGE UP
BILIRUB SERPL-MCNC: 0.6 MG/DL — SIGNIFICANT CHANGE UP (ref 0.2–1.2)
BUN SERPL-MCNC: 7 MG/DL — SIGNIFICANT CHANGE UP (ref 7–23)
CALCIUM SERPL-MCNC: 9.4 MG/DL — SIGNIFICANT CHANGE UP (ref 8.4–10.5)
CHLORIDE SERPL-SCNC: 98 MMOL/L — SIGNIFICANT CHANGE UP (ref 96–108)
CO2 SERPL-SCNC: 21 MMOL/L — LOW (ref 22–31)
COCAINE METAB.OTHER UR-MCNC: NEGATIVE — SIGNIFICANT CHANGE UP
CREAT SERPL-MCNC: 0.54 MG/DL — SIGNIFICANT CHANGE UP (ref 0.5–1.3)
EOSINOPHIL # BLD AUTO: 0.01 K/UL — SIGNIFICANT CHANGE UP (ref 0–0.5)
EOSINOPHIL NFR BLD AUTO: 0.1 % — SIGNIFICANT CHANGE UP (ref 0–6)
ETHANOL SERPL-MCNC: 344 MG/DL — HIGH (ref 0–10)
EXTRA BLUE TOP TUBE: SIGNIFICANT CHANGE UP
GLUCOSE SERPL-MCNC: 101 MG/DL — HIGH (ref 70–99)
HCT VFR BLD CALC: 46 % — HIGH (ref 34.5–45)
HGB BLD-MCNC: 16.1 G/DL — HIGH (ref 11.5–15.5)
IMM GRANULOCYTES NFR BLD AUTO: 0.3 % — SIGNIFICANT CHANGE UP (ref 0–1.5)
LYMPHOCYTES # BLD AUTO: 2.8 K/UL — SIGNIFICANT CHANGE UP (ref 1–3.3)
LYMPHOCYTES # BLD AUTO: 23.4 % — SIGNIFICANT CHANGE UP (ref 13–44)
MCHC RBC-ENTMCNC: 32.4 PG — SIGNIFICANT CHANGE UP (ref 27–34)
MCHC RBC-ENTMCNC: 35 GM/DL — SIGNIFICANT CHANGE UP (ref 32–36)
MCV RBC AUTO: 92.6 FL — SIGNIFICANT CHANGE UP (ref 80–100)
METHADONE UR-MCNC: NEGATIVE — SIGNIFICANT CHANGE UP
MONOCYTES # BLD AUTO: 1.09 K/UL — HIGH (ref 0–0.9)
MONOCYTES NFR BLD AUTO: 9.1 % — SIGNIFICANT CHANGE UP (ref 2–14)
NEUTROPHILS # BLD AUTO: 7.98 K/UL — HIGH (ref 1.8–7.4)
NEUTROPHILS NFR BLD AUTO: 66.5 % — SIGNIFICANT CHANGE UP (ref 43–77)
NRBC # BLD: 0 /100 WBCS — SIGNIFICANT CHANGE UP (ref 0–0)
OPIATES UR-MCNC: NEGATIVE — SIGNIFICANT CHANGE UP
PCP SPEC-MCNC: SIGNIFICANT CHANGE UP
PCP UR-MCNC: NEGATIVE — SIGNIFICANT CHANGE UP
PLATELET # BLD AUTO: 385 K/UL — SIGNIFICANT CHANGE UP (ref 150–400)
POTASSIUM SERPL-MCNC: 4.2 MMOL/L — SIGNIFICANT CHANGE UP (ref 3.5–5.3)
POTASSIUM SERPL-SCNC: 4.2 MMOL/L — SIGNIFICANT CHANGE UP (ref 3.5–5.3)
PROT SERPL-MCNC: 8.2 G/DL — SIGNIFICANT CHANGE UP (ref 6–8.3)
RBC # BLD: 4.97 M/UL — SIGNIFICANT CHANGE UP (ref 3.8–5.2)
RBC # FLD: 12.2 % — SIGNIFICANT CHANGE UP (ref 10.3–14.5)
SODIUM SERPL-SCNC: 142 MMOL/L — SIGNIFICANT CHANGE UP (ref 135–145)
THC UR QL: NEGATIVE — SIGNIFICANT CHANGE UP
WBC # BLD: 11.98 K/UL — HIGH (ref 3.8–10.5)
WBC # FLD AUTO: 11.98 K/UL — HIGH (ref 3.8–10.5)

## 2019-10-05 PROCEDURE — 71045 X-RAY EXAM CHEST 1 VIEW: CPT | Mod: 26

## 2019-10-05 PROCEDURE — 99285 EMERGENCY DEPT VISIT HI MDM: CPT

## 2019-10-05 PROCEDURE — 93010 ELECTROCARDIOGRAM REPORT: CPT

## 2019-10-05 RX ORDER — ONDANSETRON 8 MG/1
4 TABLET, FILM COATED ORAL EVERY 6 HOURS
Refills: 0 | Status: DISCONTINUED | OUTPATIENT
Start: 2019-10-05 | End: 2019-10-06

## 2019-10-05 RX ORDER — SODIUM CHLORIDE 9 MG/ML
1000 INJECTION, SOLUTION INTRAVENOUS
Refills: 0 | Status: DISCONTINUED | OUTPATIENT
Start: 2019-10-05 | End: 2019-10-06

## 2019-10-05 RX ORDER — SODIUM CHLORIDE 9 MG/ML
1000 INJECTION INTRAMUSCULAR; INTRAVENOUS; SUBCUTANEOUS ONCE
Refills: 0 | Status: COMPLETED | OUTPATIENT
Start: 2019-10-05 | End: 2019-10-05

## 2019-10-05 RX ORDER — CALCIUM CARBONATE 500(1250)
3 TABLET ORAL EVERY 6 HOURS
Refills: 0 | Status: DISCONTINUED | OUTPATIENT
Start: 2019-10-05 | End: 2019-10-06

## 2019-10-05 RX ADMIN — SODIUM CHLORIDE 1000 MILLILITER(S): 9 INJECTION INTRAMUSCULAR; INTRAVENOUS; SUBCUTANEOUS at 18:42

## 2019-10-05 RX ADMIN — SODIUM CHLORIDE 125 MILLILITER(S): 9 INJECTION, SOLUTION INTRAVENOUS at 20:29

## 2019-10-05 RX ADMIN — Medication 1 MILLIGRAM(S): at 20:35

## 2019-10-05 RX ADMIN — Medication 1 MILLIGRAM(S): at 18:33

## 2019-10-05 RX ADMIN — Medication 1 MILLIGRAM(S): at 19:56

## 2019-10-05 RX ADMIN — Medication 25 MILLIGRAM(S): at 18:51

## 2019-10-05 NOTE — ED ADULT TRIAGE NOTE - CHIEF COMPLAINT QUOTE
" I am having heart palpitations from alcohol since thursday ," Denies any sob nor chest pain . Been drinking alcohol every day for 2 weeks last drink was 1 hr ago .

## 2019-10-05 NOTE — ED PROVIDER NOTE - CLINICAL SUMMARY MEDICAL DECISION MAKING FREE TEXT BOX
44 y/o F with PMHx of EtOH abuse, recent admission for alcohol withdrawal, presenting with tachycardia and palpitations. No chest pain. Nonspecific ST/T changes on EKG, plan for full set of labs, hydrate, give Ativan and Librium. 42 y/o F with PMHx of EtOH abuse, recent admission for alcohol withdrawal, presenting with tachycardia and palpitations. No chest pain. Nonspecific ST/T changes on EKG, plan for full set of labs, hydrate, give Ativan and Librium and an MVi, reassess. VS wnl, patient awake, alert, stable. 42 y/o F with PMHx of EtOH abuse, recent admission for alcohol withdrawal, presenting with tachycardia and palpitations. No chest pain. Nonspecific ST/T changes on EKG, plan for full set of labs, hydrate, give Ativan and Librium and an MVi, reassess. VS wnl, patient awake, alert, CIWA 1-2  stable.

## 2019-10-05 NOTE — ED PROVIDER NOTE - CARE PLAN
Principal Discharge DX:	Alcohol abuse  Secondary Diagnosis:	Alcohol dependence  Secondary Diagnosis:	Dehydration

## 2019-10-05 NOTE — ED ADULT NURSE NOTE - OBJECTIVE STATEMENT
pt to ER w/ report of feeling shaky and having sensation of heart racing since last night after drinking alcohol.  Last drink 1 hr PTA in ER.  Hx alcohol abuse.  HR noted to be tachycardic in 130s.  IV access established, labs drawn and sent. 12 lead ekg done and shown to ER attending physician.  Pt maintained on CM. IV fluids infusing per MD order.  Pt denies sob/f/c/n/v.  Breathing unlabored, skin warm and dry. Will continue to monitor.

## 2019-10-06 ENCOUNTER — TRANSCRIPTION ENCOUNTER (OUTPATIENT)
Age: 44
End: 2019-10-06

## 2019-10-06 VITALS
TEMPERATURE: 99 F | OXYGEN SATURATION: 98 % | DIASTOLIC BLOOD PRESSURE: 88 MMHG | HEART RATE: 106 BPM | RESPIRATION RATE: 18 BRPM | SYSTOLIC BLOOD PRESSURE: 137 MMHG

## 2019-10-06 LAB
ANION GAP SERPL CALC-SCNC: 12 MMOL/L — SIGNIFICANT CHANGE UP (ref 5–17)
BASOPHILS # BLD AUTO: 0.06 K/UL — SIGNIFICANT CHANGE UP (ref 0–0.2)
BASOPHILS NFR BLD AUTO: 0.7 % — SIGNIFICANT CHANGE UP (ref 0–2)
BLD GP AB SCN SERPL QL: NEGATIVE — SIGNIFICANT CHANGE UP
BUN SERPL-MCNC: 6 MG/DL — LOW (ref 7–23)
CALCIUM SERPL-MCNC: 8.1 MG/DL — LOW (ref 8.4–10.5)
CHLORIDE SERPL-SCNC: 104 MMOL/L — SIGNIFICANT CHANGE UP (ref 96–108)
CO2 SERPL-SCNC: 24 MMOL/L — SIGNIFICANT CHANGE UP (ref 22–31)
CREAT SERPL-MCNC: 0.51 MG/DL — SIGNIFICANT CHANGE UP (ref 0.5–1.3)
EOSINOPHIL # BLD AUTO: 0.04 K/UL — SIGNIFICANT CHANGE UP (ref 0–0.5)
EOSINOPHIL NFR BLD AUTO: 0.5 % — SIGNIFICANT CHANGE UP (ref 0–6)
GLUCOSE SERPL-MCNC: 90 MG/DL — SIGNIFICANT CHANGE UP (ref 70–99)
HCT VFR BLD CALC: 36.4 % — SIGNIFICANT CHANGE UP (ref 34.5–45)
HGB BLD-MCNC: 12.4 G/DL — SIGNIFICANT CHANGE UP (ref 11.5–15.5)
IMM GRANULOCYTES NFR BLD AUTO: 0.4 % — SIGNIFICANT CHANGE UP (ref 0–1.5)
LYMPHOCYTES # BLD AUTO: 1.89 K/UL — SIGNIFICANT CHANGE UP (ref 1–3.3)
LYMPHOCYTES # BLD AUTO: 22.7 % — SIGNIFICANT CHANGE UP (ref 13–44)
MAGNESIUM SERPL-MCNC: 1.7 MG/DL — SIGNIFICANT CHANGE UP (ref 1.6–2.6)
MCHC RBC-ENTMCNC: 32.2 PG — SIGNIFICANT CHANGE UP (ref 27–34)
MCHC RBC-ENTMCNC: 34.1 GM/DL — SIGNIFICANT CHANGE UP (ref 32–36)
MCV RBC AUTO: 94.5 FL — SIGNIFICANT CHANGE UP (ref 80–100)
MONOCYTES # BLD AUTO: 0.91 K/UL — HIGH (ref 0–0.9)
MONOCYTES NFR BLD AUTO: 11 % — SIGNIFICANT CHANGE UP (ref 2–14)
NEUTROPHILS # BLD AUTO: 5.38 K/UL — SIGNIFICANT CHANGE UP (ref 1.8–7.4)
NEUTROPHILS NFR BLD AUTO: 64.7 % — SIGNIFICANT CHANGE UP (ref 43–77)
NRBC # BLD: 0 /100 WBCS — SIGNIFICANT CHANGE UP (ref 0–0)
PLATELET # BLD AUTO: 258 K/UL — SIGNIFICANT CHANGE UP (ref 150–400)
POTASSIUM SERPL-MCNC: 3.9 MMOL/L — SIGNIFICANT CHANGE UP (ref 3.5–5.3)
POTASSIUM SERPL-SCNC: 3.9 MMOL/L — SIGNIFICANT CHANGE UP (ref 3.5–5.3)
RBC # BLD: 3.85 M/UL — SIGNIFICANT CHANGE UP (ref 3.8–5.2)
RBC # FLD: 12.5 % — SIGNIFICANT CHANGE UP (ref 10.3–14.5)
RH IG SCN BLD-IMP: POSITIVE — SIGNIFICANT CHANGE UP
SODIUM SERPL-SCNC: 140 MMOL/L — SIGNIFICANT CHANGE UP (ref 135–145)
WBC # BLD: 8.31 K/UL — SIGNIFICANT CHANGE UP (ref 3.8–10.5)
WBC # FLD AUTO: 8.31 K/UL — SIGNIFICANT CHANGE UP (ref 3.8–10.5)

## 2019-10-06 RX ORDER — NALTREXONE HYDROCHLORIDE 50 MG/1
1 TABLET, FILM COATED ORAL
Qty: 0 | Refills: 0 | DISCHARGE

## 2019-10-06 RX ORDER — SODIUM CHLORIDE 9 MG/ML
1000 INJECTION, SOLUTION INTRAVENOUS ONCE
Refills: 0 | Status: COMPLETED | OUTPATIENT
Start: 2019-10-06 | End: 2019-10-06

## 2019-10-06 RX ORDER — AMLODIPINE BESYLATE 2.5 MG/1
2.5 TABLET ORAL DAILY
Refills: 0 | Status: DISCONTINUED | OUTPATIENT
Start: 2019-10-06 | End: 2019-10-06

## 2019-10-06 RX ORDER — MAGNESIUM SULFATE 500 MG/ML
2 VIAL (ML) INJECTION ONCE
Refills: 0 | Status: COMPLETED | OUTPATIENT
Start: 2019-10-06 | End: 2019-10-06

## 2019-10-06 RX ADMIN — Medication 50 GRAM(S): at 09:32

## 2019-10-06 RX ADMIN — SODIUM CHLORIDE 1000 MILLILITER(S): 9 INJECTION, SOLUTION INTRAVENOUS at 02:34

## 2019-10-06 RX ADMIN — AMLODIPINE BESYLATE 2.5 MILLIGRAM(S): 2.5 TABLET ORAL at 09:32

## 2019-10-06 NOTE — H&P ADULT - HISTORY OF PRESENT ILLNESS
Ms Gonzalez is a 42 yo F with PMH of EtOH use disorder, HTN who presents requesting medical management for EtOH detox. She is in an outpatient treatment program and was sober for 7 weeks, and has relapsed the last 2 weeks (15 oz liquor/day). Has never previously had ICU admission, been intubated, had seizures, or delirium tremens due to EtOH withdrawal. Her last drink was Thursday night. She received both Librium and Ativan in the ED, as well as Zofran. Her symptoms at the time were tachycardia (to 114) and nausea, which resolved with treatment. CIWA 2 on presentation, and 0 after treatment.

## 2019-10-06 NOTE — PROGRESS NOTE ADULT - ASSESSMENT
ETOH abuse  withdrawal, better  consider dd.c had doctor;s appointment tomorrow  problem serious and ongoing  multiple advise and counselling and despite has relapses

## 2019-10-06 NOTE — DISCHARGE NOTE NURSING/CASE MANAGEMENT/SOCIAL WORK - PATIENT PORTAL LINK FT
You can access the FollowMyHealth Patient Portal offered by [facility] by registering at the following website: [email or web address]. By joining [employer]’s FollowMyHealth portal, you will also be able to view your health information using other applications (apps) compatible with our system.

## 2019-10-06 NOTE — H&P ADULT - NSHPSOCIALHISTORY_GEN_ALL_CORE
Denies current tobacco use  History of alcohol use disorder, in outpatient treatment program, trying to get sober after 2-week relapse  Denies other drug use  Denies lifetime history of IVDU

## 2019-10-06 NOTE — H&P ADULT - NSHPLABSRESULTS_GEN_ALL_CORE
.  LABS:                         12.4   8.31  )-----------( 258      ( 06 Oct 2019 06:28 )             36.4     10-06    140  |  104  |  6<L>  ----------------------------<  90  3.9   |  24  |  0.51    Ca    8.1<L>      06 Oct 2019 06:28  Mg     1.7     10-06    TPro  8.2  /  Alb  5.3<H>  /  TBili  0.6  /  DBili  x   /  AST  32  /  ALT  21  /  AlkPhos  80  10-05        CARDIAC MARKERS ( 05 Oct 2019 18:36 )  x     / <0.01 ng/mL / 101 U/L / x     / 2.1 ng/mL            RADIOLOGY, EKG & ADDITIONAL TESTS: Reviewed.

## 2019-10-06 NOTE — DISCHARGE NOTE PROVIDER - CARE PROVIDER_API CALL
Tristen Diaz)  [facility]  [address], [address]  [place] [address]  Phone: [phone]  Fax: [phone]  Follow Up Time: 2 weeks

## 2019-10-06 NOTE — DISCHARGE NOTE PROVIDER - NSDCCPCAREPLAN_GEN_ALL_CORE_FT
PRINCIPAL DISCHARGE DIAGNOSIS  Diagnosis: Alcohol dependence  Assessment and Plan of Treatment: You have a history of alcohol dependence or alcoholism. Alcohol abuse can be dangerous to your liver and you may experience alcohol withdrawal if you stop drinking suddenly. Common symptoms of alcohol withdrawal include shaking/tremors, vomiting, feelings of severe anxiety/agitation, sweating, fast heart rate, and sometimes hallucinations. Please follow up closely with your primary care physicia to be properly monitored and treated. Please seek care immediately or return to the emergency department if you have symptoms such a but not limited to: severe convulsions, difficulty breathing, chest pain, and/or hallucinations.        SECONDARY DISCHARGE DIAGNOSES  Diagnosis: Dehydration  Assessment and Plan of Treatment: You were dehydrated due to alcohol use. You were treated with IV hydration.

## 2019-10-06 NOTE — H&P ADULT - NSHPPHYSICALEXAM_GEN_ALL_CORE
THIS IS TEMPLATED LANGUGE OF GENERIC PHYSICAL EXAM. DOCUMENT TO BE UPDATED WITH PATIENT'S ACTUAL PHYSICAL EXAM FINDINGS.    VITAL SIGNS:  T(C): 37.4 (10-06-19 @ 07:25), Max: 37.4 (10-06-19 @ 07:25)  T(F): 99.3 (10-06-19 @ 07:25), Max: 99.3 (10-06-19 @ 07:25)  HR: 120 (10-06-19 @ 07:25) (98 - 144)  BP: 125/82 (10-06-19 @ 05:12) (125/82 - 141/92)  BP(mean): --  RR: 20 (10-06-19 @ 07:25) (16 - 20)  SpO2: 99% (10-06-19 @ 07:25) (97% - 100%)  Wt(kg): --    PHYSICAL EXAM:    Constitutional: Adult White female, WDWN, seated comfortably in bed, in no acute distress  Head: NC/AT  Eyes: PERRL, EOMI, anicteric sclera  ENT: MMM  Neck: no JVD  Respiratory: CTA B/L; no W/R/R  Cardiac: RRR  Gastrointestinal: soft, NT/ND; no rebound or guarding  Extremities: WWP, no cyanosis; no peripheral edema  Musculoskeletal: no joint swelling, tenderness or erythema  Vascular: 2+ radial, PT pulses bilaterally  Dermatologic: skin warm, dry and intact; no rashes, wounds, or scars  Neurologic: AAOx3; cranial nerves grossly intact; L hand with contractures, with intact  strength, dexterity and no dysmetria; no other gross focal deficits  Psychiatric: affect and characteristics of appearance, verbalizations, behaviors are appropriate

## 2019-10-06 NOTE — PROGRESS NOTE ADULT - SUBJECTIVE AND OBJECTIVE BOX
coverage for Dr Pizano    Patient is a 43y old  Female who presents with a chief complaint of requesting medical management of EtOH withdrawal (06 Oct 2019 05:00)        Ms Gonzalez is a 42 yo F with PMH of EtOH use disorder, )multiple hospital admits) HTN who presents requesting medical management for EtOH detox. She is in an outpatient treatment program and was sober for 7 weeks, and has relapsed the last 2 weeks (15 oz liquor/day). Has never previously had ICU admission, been intubated, had seizures, or delirium tremens due to EtOH withdrawal. Her last drink was Thursday night. She received both Librium and Ativan in the ED, as well as Zofran. Her symptoms at the time were tachycardia (to 114) and nausea, which resolved with treatment. CIWA 2 on presentation, and 0 after treatment. Currently feels better      REVIEW OF SYSTEMS:  Constitutional: No fever, weight loss or fatigue  ENMT:  No difficulty hearing, tinnitus, vertigo; No sinus or throat pain  Respiratory: No cough, wheezing, chills or hemoptysis  Cardiovascular: No chest pain, palpitations, dizziness or leg swelling  Gastrointestinal: No abdominal or epigastric pain. No nausea, vomiting or hematemesis; No diarrhea or constipation. No melena or hematochezia.  Skin: No itching, burning, rashes or lesions   Musculoskeletal: No joint pain or swelling; No muscle, back or extremity pain    PAST MEDICAL & SURGICAL HISTORY:  Hypertension  Alcohol abuse  S/P wrist surgery      FAMILY HISTORY:  No family history of cardiovascular disease (Father)  Family history of osteoporosis (Mother)  Family history of Parkinson disease (Mother)      SOCIAL HISTORY:  Smoking Status: [ ] Current, [ ] Former, [ ] Never  Pack Years:    MEDICATIONS:  MEDICATIONS  (STANDING):  amLODIPine   Tablet 2.5 milliGRAM(s) Oral daily  multivitamin/thiamine/folic acid in sodium chloride 0.9% 1000 milliLiter(s) (125 mL/Hr) IV Continuous <Continuous>    MEDICATIONS  (PRN):  calcium carbonate    500 mG (Tums) Chewable 3 Tablet(s) Chew every 6 hours PRN Dyspepsia  LORazepam   Injectable 2 milliGRAM(s) IV Push every 1 hour PRN CIWA-Ar score 8 or greater  ondansetron Injectable 4 milliGRAM(s) IV Push every 6 hours PRN Nausea      Allergies    predniSONE (Rash)  Tamiflu (Other)    Intolerances        Vital Signs Last 24 Hrs  T(C): 37.1 (06 Oct 2019 09:11), Max: 37.4 (06 Oct 2019 07:25)  T(F): 98.8 (06 Oct 2019 09:11), Max: 99.3 (06 Oct 2019 07:25)  HR: 106 (06 Oct 2019 09:11) (98 - 144)  BP: 137/88 (06 Oct 2019 09:11) (125/82 - 141/92)  BP(mean): --  RR: 18 (06 Oct 2019 09:11) (16 - 20)  SpO2: 98% (06 Oct 2019 09:11) (97% - 100%)    10-05 @ 07:01  -  10-06 @ 07:00  --------------------------------------------------------  IN: 1950 mL / OUT: 0 mL / NET: 1950 mL          PHYSICAL EXAM:    General: Well developed; well nourished; in no acute distress  HEENT: MMM, conjunctiva and sclera clear  Lungs: clear  Heart: regular  Gastrointestinal: Soft, non-tender non-distended; Normal bowel sounds; No rebound or guarding  Extremities: Normal range of motion, No clubbing, cyanosis or edema  Neurological: Alert and oriented x3, no tremors  Skin: Warm and dry. No obvious rash      LABS:                        12.4   8.31  )-----------( 258      ( 06 Oct 2019 06:28 )             36.4     10-06    140  |  104  |  6<L>  ----------------------------<  90  3.9   |  24  |  0.51    Ca    8.1<L>      06 Oct 2019 06:28  Mg     1.7     10-06    TPro  8.2  /  Alb  5.3<H>  /  TBili  0.6  /  DBili  x   /  AST  32  /  ALT  21  /  AlkPhos  80  10-05          RADIOLOGY & ADDITIONAL STUDIES:

## 2019-10-06 NOTE — H&P ADULT - ASSESSMENT
Ms. Gonzalez is a 42 yo F presenting with low-risk alcohol withdrawal after nausea, palpitations and anxiety symptoms caused her to seek medical management of her alcohol withdrawal. Her CIWA has ranged 0-2 since admission.    # Alcohol withdrawal  - has not require Librium since ED  - as she is just getting to 3 days s/p last drink, she may need Librium 25 mg q8h started today, with or without a small dose of Ativan PO (e.g. 0.5 mg)  - Zofran PRN for nausea  - status post banana bag today; can start folic acid, thiamine and MVI    # Dehydration secondary to poor oral intake secondary to nausea  - status post banana bag and 1 L LR  - encourage PO intake    # HTN  - normotensive currently 2/2 volume depletion, home amlodipine 2.5 mg qd ordered with hold parameters

## 2019-10-06 NOTE — DISCHARGE NOTE PROVIDER - HOSPITAL COURSE
Patient is a 44 yo F with past medical history of EtOH use disorder and HTN.    Presented with palpitations and nausea, found to have alcohol withdrawal.        Problem List/Main Diagnoses (system-based):     # Alcohol withdrawal    - Last drink was 3 days ago    - S/p 2L NS, 1L LR, banana bag, librium 25mg x1, and ativan 1mg x3 in the ED    - has not required Librium or Ativan since ED        # Tachycardia    - Pt tachycardic to 144 on admission, improved with librium and ativan    - EKG showed sinus tachycardia    - Likely 2/2 alcohol withdrawal        # Dehydration secondary to poor oral intake secondary to nausea    - status post banana bag, 2L NS, 1 L LR    - encourage PO intake        # HTN    - normotensive currently 2/2 volume depletion, home amlodipine 2.5 mg qd started with hold parameters        Inpatient treatment course: 2L NS, 1L LR, banana bag, librium 25mg x1, and ativan 1mg x3        New medications: None.        Labs to be followed outpatient: CBC, BMP, Mg, folate, B12    Exam to be followed outpatient: EKG, Heart exam, Abd exam

## 2019-10-09 DIAGNOSIS — R00.0 TACHYCARDIA, UNSPECIFIED: ICD-10-CM

## 2019-10-09 DIAGNOSIS — R00.2 PALPITATIONS: ICD-10-CM

## 2019-10-09 DIAGNOSIS — E86.0 DEHYDRATION: ICD-10-CM

## 2019-10-09 DIAGNOSIS — I10 ESSENTIAL (PRIMARY) HYPERTENSION: ICD-10-CM

## 2019-10-09 DIAGNOSIS — G47.00 INSOMNIA, UNSPECIFIED: ICD-10-CM

## 2019-10-09 DIAGNOSIS — F10.239 ALCOHOL DEPENDENCE WITH WITHDRAWAL, UNSPECIFIED: ICD-10-CM

## 2019-10-14 ENCOUNTER — EMERGENCY (EMERGENCY)
Facility: HOSPITAL | Age: 44
LOS: 1 days | Discharge: HOME | End: 2019-10-14
Attending: EMERGENCY MEDICINE | Admitting: EMERGENCY MEDICINE
Payer: MEDICAID

## 2019-10-14 VITALS
OXYGEN SATURATION: 97 % | SYSTOLIC BLOOD PRESSURE: 134 MMHG | RESPIRATION RATE: 19 BRPM | TEMPERATURE: 99 F | HEART RATE: 140 BPM | DIASTOLIC BLOOD PRESSURE: 91 MMHG

## 2019-10-14 DIAGNOSIS — F10.120 ALCOHOL ABUSE WITH INTOXICATION, UNCOMPLICATED: ICD-10-CM

## 2019-10-14 DIAGNOSIS — E86.0 DEHYDRATION: ICD-10-CM

## 2019-10-14 DIAGNOSIS — E87.2 ACIDOSIS: ICD-10-CM

## 2019-10-14 DIAGNOSIS — Z98.890 OTHER SPECIFIED POSTPROCEDURAL STATES: Chronic | ICD-10-CM

## 2019-10-14 DIAGNOSIS — R00.2 PALPITATIONS: ICD-10-CM

## 2019-10-14 LAB
ALBUMIN SERPL ELPH-MCNC: 4.8 G/DL — SIGNIFICANT CHANGE UP (ref 3.3–5)
ALP SERPL-CCNC: 73 U/L — SIGNIFICANT CHANGE UP (ref 40–120)
ALT FLD-CCNC: 103 U/L — HIGH (ref 10–45)
ANION GAP SERPL CALC-SCNC: 20 MMOL/L — HIGH (ref 5–17)
APPEARANCE UR: CLEAR — SIGNIFICANT CHANGE UP
AST SERPL-CCNC: 129 U/L — HIGH (ref 10–40)
BASOPHILS # BLD AUTO: 0.05 K/UL — SIGNIFICANT CHANGE UP (ref 0–0.2)
BASOPHILS NFR BLD AUTO: 0.9 % — SIGNIFICANT CHANGE UP (ref 0–2)
BILIRUB SERPL-MCNC: 1.3 MG/DL — HIGH (ref 0.2–1.2)
BILIRUB UR-MCNC: ABNORMAL
BUN SERPL-MCNC: 11 MG/DL — SIGNIFICANT CHANGE UP (ref 7–23)
CALCIUM SERPL-MCNC: 9 MG/DL — SIGNIFICANT CHANGE UP (ref 8.4–10.5)
CHLORIDE SERPL-SCNC: 98 MMOL/L — SIGNIFICANT CHANGE UP (ref 96–108)
CO2 SERPL-SCNC: 23 MMOL/L — SIGNIFICANT CHANGE UP (ref 22–31)
COLOR SPEC: YELLOW — SIGNIFICANT CHANGE UP
CREAT SERPL-MCNC: 0.57 MG/DL — SIGNIFICANT CHANGE UP (ref 0.5–1.3)
DIFF PNL FLD: ABNORMAL
EOSINOPHIL # BLD AUTO: 0.02 K/UL — SIGNIFICANT CHANGE UP (ref 0–0.5)
EOSINOPHIL NFR BLD AUTO: 0.4 % — SIGNIFICANT CHANGE UP (ref 0–6)
ETHANOL SERPL-MCNC: 390 MG/DL — HIGH (ref 0–10)
EXTRA BLUE TOP TUBE: SIGNIFICANT CHANGE UP
EXTRA SST TUBE: SIGNIFICANT CHANGE UP
GLUCOSE SERPL-MCNC: 93 MG/DL — SIGNIFICANT CHANGE UP (ref 70–99)
GLUCOSE UR QL: NEGATIVE — SIGNIFICANT CHANGE UP
HCT VFR BLD CALC: 43.7 % — SIGNIFICANT CHANGE UP (ref 34.5–45)
HGB BLD-MCNC: 14.9 G/DL — SIGNIFICANT CHANGE UP (ref 11.5–15.5)
IMM GRANULOCYTES NFR BLD AUTO: 0.4 % — SIGNIFICANT CHANGE UP (ref 0–1.5)
KETONES UR-MCNC: 15 MG/DL
LEUKOCYTE ESTERASE UR-ACNC: NEGATIVE — SIGNIFICANT CHANGE UP
LYMPHOCYTES # BLD AUTO: 2.39 K/UL — SIGNIFICANT CHANGE UP (ref 1–3.3)
LYMPHOCYTES # BLD AUTO: 42.4 % — SIGNIFICANT CHANGE UP (ref 13–44)
MAGNESIUM SERPL-MCNC: 2 MG/DL — SIGNIFICANT CHANGE UP (ref 1.6–2.6)
MCHC RBC-ENTMCNC: 32.3 PG — SIGNIFICANT CHANGE UP (ref 27–34)
MCHC RBC-ENTMCNC: 34.1 GM/DL — SIGNIFICANT CHANGE UP (ref 32–36)
MCV RBC AUTO: 94.6 FL — SIGNIFICANT CHANGE UP (ref 80–100)
MONOCYTES # BLD AUTO: 0.74 K/UL — SIGNIFICANT CHANGE UP (ref 0–0.9)
MONOCYTES NFR BLD AUTO: 13.1 % — SIGNIFICANT CHANGE UP (ref 2–14)
NEUTROPHILS # BLD AUTO: 2.42 K/UL — SIGNIFICANT CHANGE UP (ref 1.8–7.4)
NEUTROPHILS NFR BLD AUTO: 42.8 % — LOW (ref 43–77)
NITRITE UR-MCNC: NEGATIVE — SIGNIFICANT CHANGE UP
NRBC # BLD: 0 /100 WBCS — SIGNIFICANT CHANGE UP (ref 0–0)
PCP SPEC-MCNC: SIGNIFICANT CHANGE UP
PH UR: 6.5 — SIGNIFICANT CHANGE UP (ref 5–8)
PLATELET # BLD AUTO: 221 K/UL — SIGNIFICANT CHANGE UP (ref 150–400)
POTASSIUM SERPL-MCNC: 4.4 MMOL/L — SIGNIFICANT CHANGE UP (ref 3.5–5.3)
POTASSIUM SERPL-SCNC: 4.4 MMOL/L — SIGNIFICANT CHANGE UP (ref 3.5–5.3)
PROT SERPL-MCNC: 8.1 G/DL — SIGNIFICANT CHANGE UP (ref 6–8.3)
PROT UR-MCNC: 30 MG/DL
RBC # BLD: 4.62 M/UL — SIGNIFICANT CHANGE UP (ref 3.8–5.2)
RBC # FLD: 12.3 % — SIGNIFICANT CHANGE UP (ref 10.3–14.5)
SODIUM SERPL-SCNC: 141 MMOL/L — SIGNIFICANT CHANGE UP (ref 135–145)
SP GR SPEC: 1.02 — SIGNIFICANT CHANGE UP (ref 1–1.03)
UROBILINOGEN FLD QL: 0.2 E.U./DL — SIGNIFICANT CHANGE UP
WBC # BLD: 5.64 K/UL — SIGNIFICANT CHANGE UP (ref 3.8–10.5)
WBC # FLD AUTO: 5.64 K/UL — SIGNIFICANT CHANGE UP (ref 3.8–10.5)

## 2019-10-14 PROCEDURE — 99284 EMERGENCY DEPT VISIT MOD MDM: CPT

## 2019-10-14 PROCEDURE — 93010 ELECTROCARDIOGRAM REPORT: CPT

## 2019-10-14 RX ORDER — SODIUM CHLORIDE 9 MG/ML
3 INJECTION INTRAMUSCULAR; INTRAVENOUS; SUBCUTANEOUS ONCE
Refills: 0 | Status: COMPLETED | OUTPATIENT
Start: 2019-10-14 | End: 2019-10-14

## 2019-10-14 RX ORDER — SODIUM CHLORIDE 9 MG/ML
1000 INJECTION INTRAMUSCULAR; INTRAVENOUS; SUBCUTANEOUS ONCE
Refills: 0 | Status: COMPLETED | OUTPATIENT
Start: 2019-10-14 | End: 2019-10-15

## 2019-10-14 RX ORDER — SODIUM CHLORIDE 9 MG/ML
1000 INJECTION, SOLUTION INTRAVENOUS
Refills: 0 | Status: DISCONTINUED | OUTPATIENT
Start: 2019-10-14 | End: 2019-10-22

## 2019-10-14 RX ORDER — SODIUM CHLORIDE 9 MG/ML
1000 INJECTION INTRAMUSCULAR; INTRAVENOUS; SUBCUTANEOUS ONCE
Refills: 0 | Status: COMPLETED | OUTPATIENT
Start: 2019-10-14 | End: 2019-10-14

## 2019-10-14 RX ADMIN — SODIUM CHLORIDE 250 MILLILITER(S): 9 INJECTION, SOLUTION INTRAVENOUS at 21:09

## 2019-10-14 RX ADMIN — Medication 1 MILLIGRAM(S): at 21:09

## 2019-10-14 RX ADMIN — SODIUM CHLORIDE 3 MILLILITER(S): 9 INJECTION INTRAMUSCULAR; INTRAVENOUS; SUBCUTANEOUS at 21:09

## 2019-10-14 RX ADMIN — SODIUM CHLORIDE 1000 MILLILITER(S): 9 INJECTION INTRAMUSCULAR; INTRAVENOUS; SUBCUTANEOUS at 19:51

## 2019-10-14 RX ADMIN — Medication 50 MILLIGRAM(S): at 19:52

## 2019-10-14 NOTE — ED ADULT NURSE NOTE - CHIEF COMPLAINT
Subjective





- Date & Time of Evaluation


Date of Evaluation: 01/14/16


Time of Evaluation: 10:16





- Subjective


Subjective: 


pt comfortable in bed. denies complaints. no distress.  1:1 remains at bedside. 

has been able to maintain off restraints. pending guardianship





Objective





- Vital Signs/Intake and Output


Vital Signs (last 24 hours): 


 











Temp Pulse Resp BP Pulse Ox


 


 97.5 F L  81   20   143/76   97 


 


 01/14/16 08:19  01/14/16 09:12  01/14/16 08:19  01/14/16 09:12  01/14/16 08:19











- Medications


Medications: 


 Current Medications





Aspirin (Ecotrin)  81 mg PO DAILY [facility]


   Last Admin: 01/14/16 09:10 Dose:  81 mg


Enalapril Maleate (Vasotec)  10 mg PO DAILY [facility]


   Last Admin: 01/14/16 09:13 Dose:  10 mg


Enoxaparin Sodium (Lovenox)  40 mg SC DAILY [facility]


   Last Admin: 01/14/16 09:12 Dose:  40 mg


Famotidine (Pepcid)  20 mg GT BID [facility]


   Last Admin: 01/14/16 09:13 Dose:  20 mg


Haloperidol Lactate (Haldol)  2.5 mg IM Q6 PRN


   PRN Reason: Agitation


Levetiracetam (Keppra)  500 mg PO BID [facility]


   Last Admin: 01/14/16 09:10 Dose:  500 mg


Lorazepam (Ativan)  1 mg IVP Q4 PRN


   PRN Reason: Agitation


   Last Admin: 01/13/16 13:30 Dose:  1 mg


Metoprolol Tartrate (Lopressor)  100 mg PO Q12 [facility]


   Last Admin: 01/14/16 09:12 Dose:  100 mg


Multivitamins/Vitamin C (Multi-Delyn Liquid)  5 ml PO DAILY [facility]


   Last Admin: 01/14/16 09:13 Dose:  5 ml


Nitroglycerin (Nitro-Bid 2% Oint)  2 in TOP Q6 [facility]


   Last Admin: 01/14/16 09:13 Dose:  2 in


Quetiapine Fumarate (Seroquel)  25 mg PO DAILY@1700 [facility]


   Last Admin: 01/13/16 17:11 Dose:  25 mg











- Labs


Labs: 


 





 01/10/16 08:10 





 01/10/16 08:10 





 











PT  11.2 SECONDS (9.4-12.0)   12/14/15  05:20    


 


INR  1.05  (0.89-1.20)   12/14/15  05:20    


 


APTT  36.2 SECONDS (23.1-32.0)  H  12/14/15  05:20    














- Constitutional


Appears: Well, Non-toxic, No Acute Distress, Chronically Ill





- Head Exam


Head Exam: ATRAUMATIC, NORMAL INSPECTION, NORMOCEPHALIC





- Eye Exam


Eye Exam: EOMI





- ENT Exam


ENT Exam: Mucous Membranes Moist, Normal Exam





- Respiratory Exam


Respiratory Exam: Clear to Ausculation Bilateral, NORMAL BREATHING PATTERN





- Cardiovascular Exam


Cardiovascular Exam: REGULAR RHYTHM, RRR





- GI/Abdominal Exam


GI & Abdominal Exam: Soft, Normal Bowel Sounds





- Extremities Exam


Extremities Exam: Full ROM, Normal Capillary Refill, Normal Inspection





- Back Exam


Back Exam: NORMAL INSPECTION





- Neurological Exam


Neurological Exam: Abnormal Gait, Alert, Awake, CN II-XII Intact





- Psychiatric Exam


Psychiatric exam: Normal Affect, Normal Mood





- Skin


Skin Exam: Dry, Intact, Normal Color, Warm





Assessment and Plan


(1) DVT prophylaxis


Assessment & Plan: 


scd and aehose, lovnoex


Status: Acute





(2) Scrotal edema


Assessment & Plan: 


elevate, monitor


Status: Chronic





(3) History of seizure


Assessment & Plan: 


cont keppra


Status: Chronic





(4) Diabetes mellitus with hyperglycemia


Assessment & Plan: 


cont fsbg monitor


Status: Chronic





(5) Agitation


Assessment & Plan: 


1:1


ativan prn


seroquel


Status: Acute





(6) Aspiration pneumonia


Assessment & Plan: 


otn anbx


ID consult


Status: Acute





(7) CAD (coronary artery disease)


Assessment & Plan: 


cont all meds


cardio


s/p cardiac arres


Status: Acute The patient is a 43y Female complaining of tremor.

## 2019-10-14 NOTE — ED ADULT NURSE NOTE - OBJECTIVE STATEMENT
Pt presents to ED complaining of alcohol withdrawal. Pt A&Ox3. Pt states "I know that I am withdrawing. I am having heart palpitations. I started withdrawing two days ago because my heart was beating fast. I drank some alcohol to slow my heart rate down". Pt reports last drink at 4pm today. Pt denies dizziness, nausea, vomiting, fevers. Pt reports history of alcohol withdrawal. Denies withdrawal seizures in the past.

## 2019-10-14 NOTE — ED ADULT NURSE NOTE - NSIMPLEMENTINTERV_GEN_ALL_ED
Implemented All Fall Risk Interventions:  [place] to call system. Call bell, personal items and telephone within reach. Instruct patient to call for assistance. Room bathroom lighting operational. Non-slip footwear when patient is off stretcher. Physically safe environment: no spills, clutter or unnecessary equipment. Stretcher in lowest position, wheels locked, appropriate side rails in place. Provide visual cue, wrist band, yellow gown, etc. Monitor gait and stability. Monitor for mental status changes and reorient to person, place, and time. Review medications for side effects contributing to fall risk. Reinforce activity limits and safety measures with patient and family.

## 2019-10-14 NOTE — ED PROVIDER NOTE - CLINICAL SUMMARY MEDICAL DECISION MAKING FREE TEXT BOX
Pt p/w alcohol abuse, palpitations. Multiple admissions in the past for the same. Suspect intoxication, rather than w/d at this time. No clear w/d outside tachycardia at this time. CIWA 1. Suspect dehydration and AKA in the setting of persistent drinking and poor PO intake. No f/c, nor infectious sx. No other complaints. IVF, MVI, check labs, monitor in ED, benzos. Dispo pending w/u and clinical status

## 2019-10-14 NOTE — ED PROVIDER NOTE - OBJECTIVE STATEMENT
Pt w/ PMHx alcoholism, HTN (not on meds), abnormal PAP s/p LEEP p/w palpitations x 2 days. Pt reports she thinks she is in alcohol withdrawal, although also reports she had been drinking daily, approx 1/3 liter of liquor per day, including today, w/ last drink at 4pm. Denies CP, SOB, f/c, URI, GI, and  sx. Admits to dec PO intake. Denies hx w/d seizures, nor DT's. Pt denies tremors, n/v, anxiety, restlessness, numbness / tinging, SI / HI / AH / VH. Denies other physical complaints.

## 2019-10-14 NOTE — ED ADULT NURSE NOTE - NS_SISCREENINGSR_GEN_ALL_ED
Mom sts sick for 1 week. Cough/vomiting/fever for 3 days. No sick contacts at home. Drinking 1 bottle a day. Mom said decreased diapers.
Negative

## 2019-10-14 NOTE — ED ADULT TRIAGE NOTE - ARRIVAL INFO ADDITIONAL COMMENTS
Pt walked into eD with c/o of body tremors, heart palpitations. Onset was  4pm. She states she was sober for 6 months until september she has been drinking again daily. She states she drank a 3rd of a liter of pint. Denies N+V+D, fever, chills, CP

## 2019-10-14 NOTE — ED PROVIDER NOTE - PHYSICAL EXAMINATION
Constitutional: Well appearing, well nourished, awake, alert, oriented to person, place, time/situation and in no apparent distress. alcohol on breath  ENMT: Airway patent. Dry MM  Eyes: Clear bilaterally, PERRL, EOMI. no tongue fasciculations  Cardiac: Normal rate, regular rhythm.  Heart sounds S1, S2.  No murmurs, rubs or gallops.  Respiratory: Breaths sounds equal and clear b/l. No increased WOB, tachypnea, hypoxia, or accessory mm use. Pt speaks in full sentences.   Gastrointestinal: Abd soft, NT, ND, NABS. No guarding, rebound, or rigidity. No pulsatile abdominal masses. No organomegaly appreciated. No CVAT   Musculoskeletal: Range of motion is not limited  Neuro: Alert and oriented x 3, face symmetric and speech fluent. Strength 5/5 x 4 ext and symmetric, nml gross motor movement, nml gait. No focal deficits noted. no tremor  Skin: Skin normal color for race, warm, dry and intact. No evidence of rash. no sweating  Psych: Alert and oriented to person, place, time/situation. normal mood and affect. no apparent risk to self or others. Constitutional: Well appearing, well nourished, awake, alert, oriented to person, place, time/situation and in no apparent distress. alcohol on breath  ENMT: Airway patent. Dry MM  Eyes: Clear bilaterally, PERRL, EOMI. no tongue fasciculations  Cardiac: tachycardic, regular rhythm.  Heart sounds S1, S2.  No murmurs, rubs or gallops.  Respiratory: Breaths sounds equal and clear b/l. No W/R/R. No increased WOB, tachypnea, hypoxia, or accessory mm use. Pt speaks in full sentences.   Gastrointestinal: Abd soft, NT, ND, NABS. No guarding, rebound, or rigidity. No pulsatile abdominal masses. No organomegaly appreciated. No CVAT   Musculoskeletal: Range of motion is not limited. No LE edema or calf ttp  Neuro: Alert and oriented x 3, face symmetric and speech fluent. Strength 5/5 x 4 ext and symmetric, nml gross motor movement, nml gait. No focal deficits noted. no tremor  Skin: Skin normal color for race, warm, dry and intact. No evidence of rash. no sweating  Psych: Alert and oriented to person, place, time/situation. normal mood and affect. no apparent risk to self or others.

## 2019-10-14 NOTE — ED PROVIDER NOTE - NS ED ROS FT
Constitutional: No fever or chills.   Eyes: No pain, blurry vision, or discharge.  ENMT: No hearing changes, pain, discharge or infections. No neck pain or stiffness.  Cardiac: No chest pain, SOB or edema. No chest pain with exertion. + palpitations  Respiratory: No cough or respiratory distress. No hemoptysis. No history of asthma or RAD.  GI: No nausea, vomiting, diarrhea or abdominal pain.  : No dysuria, frequency or burning.  MS: No myalgia, muscle weakness, joint pain or back pain.  Neuro: No headache or weakness. No LOC.  Skin: No skin rash.   Endocrine: No history of thyroid disease or diabetes.  Except as documented in the HPI, all other systems are negative.

## 2019-10-14 NOTE — ED PROVIDER NOTE - NSFOLLOWUPINSTRUCTIONS_ED_ALL_ED_FT
At 8 am today go to:    Addiction Inpatient Detox & Rehabilitation Services  [facility]  [address] [facility] ([address])  [place], [place] [address]  Tel: [phone]      Return to the Emergency Department if you have any new or worsening symptoms, or if you have any concerns.  ____________________________________________________________________    ALCOHOL INTOXICATION - AfterCare(R) Instructions(ER/ED)     Alcohol Intoxication    WHAT YOU NEED TO KNOW:    Alcohol intoxication is a harmful physical condition caused when you drink more alcohol than your body can handle. It is also called ethanol poisoning, or being drunk.    DISCHARGE INSTRUCTIONS:    Call your local emergency number (911 in the ) if:     You have sudden trouble breathing or chest pain.      You have a seizure.      You feel sad enough to harm yourself or others.    Call your doctor if:     You have hallucinations (you see or hear things that are not real).      You cannot stop vomiting.      You have questions or concerns about your condition or care.    Recommended alcohol limits:     Men 21 to 64 years should limit alcohol to 2 drinks a day. Do not have more than 4 drinks in 1 day or more than 14 in 1 week.      All women, and men 65 or older should limit alcohol to 1 drink in a day. Do not have more than 3 drinks in 1 day or more than 7 in 1 week. No amount of alcohol is okay during pregnancy.    Manage alcohol use:     Decrease the amount you drink. This can help prevent health problems such as brain, heart, and liver damage, high blood pressure, diabetes, and cancer. If you cannot stop completely, healthcare providers can help you set goals to decrease the amount you drink.      Plan weekly alcohol use. You will be less likely to drink more than the recommended limit if you plan ahead.      Have food when you drink alcohol. Food will prevent alcohol from getting into your system too quickly. Eat before you have your first alcohol drink.      Time your drinks carefully. Have no more than 1 drink in an hour. Have a liquid such as water, coffee, or a soft drink between alcohol drinks.      Do not drive if you have had alcohol. Make sure someone who has not been drinking can help you get home.      Do not drink alcohol if you are taking medicine. Alcohol is dangerous when you combine it with certain medicines, such as acetaminophen or blood pressure medicine. Talk to your healthcare provider about all the medicines you currently take.    For more information:     Alcoholics Anonymous  Web Address: http://www.aa.org      Substance Abuse and Mental Health Services Administration  [address]  [place] [address]  Web Address: http://www.[facility].gov      Follow up with your healthcare provider as directed: Write down your questions so you remember to ask them during your visits.

## 2019-10-14 NOTE — ED PROVIDER NOTE - PROGRESS NOTE DETAILS
D/w Dr Joe estrada when more sober. If feeling better, may d/c. If not can admit to his service Director - pt received from Dr Maldonado, pt feeling well.  Pt receiving ivf.  VS, labs reviewed.  Pt receiving 3rd liter ivf; will repeat bmp after finished. Fabel - Clinically sober, alert and oriented x 3; speech clear; gait steady. HR markedly improved.  CIWA score 1.  Patient concerned about wanting to drink again if she starts to have recurrent palpitations.  She is interested in inpatient detox/rehab and agrees to go this morning directly to:  [facility], [facility]

## 2019-10-14 NOTE — ED PROVIDER NOTE - CARE PLAN
Principal Discharge DX:	Alcoholic intoxication without complication  Secondary Diagnosis:	Dehydration  Secondary Diagnosis:	Alcoholic ketoacidosis

## 2019-10-15 VITALS — SYSTOLIC BLOOD PRESSURE: 135 MMHG | DIASTOLIC BLOOD PRESSURE: 81 MMHG | HEART RATE: 109 BPM

## 2019-10-15 LAB
ANION GAP SERPL CALC-SCNC: 12 MMOL/L — SIGNIFICANT CHANGE UP (ref 5–17)
BUN SERPL-MCNC: 10 MG/DL — SIGNIFICANT CHANGE UP (ref 7–23)
CALCIUM SERPL-MCNC: 8.2 MG/DL — LOW (ref 8.4–10.5)
CHLORIDE SERPL-SCNC: 103 MMOL/L — SIGNIFICANT CHANGE UP (ref 96–108)
CO2 SERPL-SCNC: 24 MMOL/L — SIGNIFICANT CHANGE UP (ref 22–31)
CREAT SERPL-MCNC: 0.5 MG/DL — SIGNIFICANT CHANGE UP (ref 0.5–1.3)
GLUCOSE SERPL-MCNC: 103 MG/DL — HIGH (ref 70–99)
POTASSIUM SERPL-MCNC: SIGNIFICANT CHANGE UP MMOL/L (ref 3.5–5.3)
POTASSIUM SERPL-SCNC: SIGNIFICANT CHANGE UP MMOL/L (ref 3.5–5.3)
SODIUM SERPL-SCNC: 139 MMOL/L — SIGNIFICANT CHANGE UP (ref 135–145)

## 2019-10-15 PROCEDURE — 80307 DRUG TEST PRSMV CHEM ANLYZR: CPT

## 2019-10-15 PROCEDURE — 96375 TX/PRO/DX INJ NEW DRUG ADDON: CPT

## 2019-10-15 PROCEDURE — 82962 GLUCOSE BLOOD TEST: CPT

## 2019-10-15 PROCEDURE — 80048 BASIC METABOLIC PNL TOTAL CA: CPT

## 2019-10-15 PROCEDURE — 83735 ASSAY OF MAGNESIUM: CPT

## 2019-10-15 PROCEDURE — 80053 COMPREHEN METABOLIC PANEL: CPT

## 2019-10-15 PROCEDURE — 99285 EMERGENCY DEPT VISIT HI MDM: CPT | Mod: 25

## 2019-10-15 PROCEDURE — 85025 COMPLETE CBC W/AUTO DIFF WBC: CPT

## 2019-10-15 PROCEDURE — 93005 ELECTROCARDIOGRAM TRACING: CPT

## 2019-10-15 PROCEDURE — 96366 THER/PROPH/DIAG IV INF ADDON: CPT

## 2019-10-15 PROCEDURE — 36415 COLL VENOUS BLD VENIPUNCTURE: CPT

## 2019-10-15 PROCEDURE — 81001 URINALYSIS AUTO W/SCOPE: CPT

## 2019-10-15 PROCEDURE — 96365 THER/PROPH/DIAG IV INF INIT: CPT

## 2019-10-15 RX ORDER — SODIUM CHLORIDE 9 MG/ML
1000 INJECTION INTRAMUSCULAR; INTRAVENOUS; SUBCUTANEOUS ONCE
Refills: 0 | Status: COMPLETED | OUTPATIENT
Start: 2019-10-15 | End: 2019-10-15

## 2019-10-15 RX ADMIN — SODIUM CHLORIDE 1000 MILLILITER(S): 9 INJECTION, SOLUTION INTRAVENOUS at 02:21

## 2019-10-15 RX ADMIN — SODIUM CHLORIDE 1000 MILLILITER(S): 9 INJECTION INTRAMUSCULAR; INTRAVENOUS; SUBCUTANEOUS at 00:22

## 2019-10-15 RX ADMIN — SODIUM CHLORIDE 250 MILLILITER(S): 9 INJECTION, SOLUTION INTRAVENOUS at 02:21

## 2019-10-15 RX ADMIN — SODIUM CHLORIDE 1000 MILLILITER(S): 9 INJECTION INTRAMUSCULAR; INTRAVENOUS; SUBCUTANEOUS at 03:18

## 2019-10-16 ENCOUNTER — EMERGENCY (EMERGENCY)
Facility: HOSPITAL | Age: 44
LOS: 1 days | Discharge: HOME | End: 2019-10-16
Attending: EMERGENCY MEDICINE | Admitting: EMERGENCY MEDICINE
Payer: MEDICAID

## 2019-10-16 VITALS
TEMPERATURE: 97 F | WEIGHT: 147.93 LBS | HEIGHT: 66 IN | HEART RATE: 104 BPM | RESPIRATION RATE: 18 BRPM | DIASTOLIC BLOOD PRESSURE: 83 MMHG | OXYGEN SATURATION: 100 % | SYSTOLIC BLOOD PRESSURE: 133 MMHG

## 2019-10-16 VITALS
OXYGEN SATURATION: 100 % | DIASTOLIC BLOOD PRESSURE: 87 MMHG | HEART RATE: 105 BPM | RESPIRATION RATE: 18 BRPM | TEMPERATURE: 98 F | SYSTOLIC BLOOD PRESSURE: 130 MMHG

## 2019-10-16 DIAGNOSIS — Z98.890 OTHER SPECIFIED POSTPROCEDURAL STATES: Chronic | ICD-10-CM

## 2019-10-16 PROCEDURE — 99283 EMERGENCY DEPT VISIT LOW MDM: CPT

## 2019-10-16 PROCEDURE — 99285 EMERGENCY DEPT VISIT HI MDM: CPT

## 2019-10-16 NOTE — ED PROVIDER NOTE - PHYSICAL EXAMINATION
CONSTITUTIONAL: Well-appearing; well-nourished; in no apparent distress.   HEAD: Normocephalic; atraumatic.   EYES:  conjunctiva and sclera clear  ENT: normal nose; no rhinorrhea; normal pharynx with no erythema or lesions.   NECK: Supple; non-tender;   CARDIOVASCULAR: Normal S1, S2; no murmurs, rubs, or gallops. Regular rate and rhythm.   RESPIRATORY: Breathing easily; breath sounds clear and equal bilaterally; no wheezes, rhonchi, or rales.  GI: Soft; non-distended; non-tender; no palpable organomegaly.   EXT: No cyanosis or edema; N/V intact  SKIN: Normal for age and race; warm; dry; good turgor; no apparent lesions or rash.   NEURO: A & O x 3; face symmetric; grossly unremarkable. no tongue fasciculation or tremors  PSYCHOLOGICAL: The patient’s mood and manner are appropriate.

## 2019-10-16 NOTE — ED ADULT NURSE NOTE - OBJECTIVE STATEMENT
pt states she went into her GYNs office to get her "LEEP reevaluated". pt denies any abd pain, chest pain, sob, palpitations, sob, dizziness, bleeding or any other medical complaints. pt states she took 50 mg librium with "a shot of rum". pt states she has hx of alcohol abuse and consumed "a quarter liter of rum". steady gait noted. pt is a/o/x3. denies any HI/SI

## 2019-10-16 NOTE — ED ADULT TRIAGE NOTE - CHIEF COMPLAINT QUOTE
Patient was sent from GYN , for alcohol intoxication . " I took my librium with rum this am ," Walked with steady gait.

## 2019-10-16 NOTE — ED PROVIDER NOTE - OBJECTIVE STATEMENT
44yo F states she went into her GYNs office to get her "LEEP reevaluated" and sent here for high blood pressure and intoxication.  pt denies any abd pain, chest pain, sob, palpitations, sob, dizziness, bleeding or any other medical complaints. pt states she took 50 mg librium with "a shot of rum" prior to going to her appt. states she has hx of alcohol abuse and consumed "a quarter liter of rum". denies any HI/SI. denies hx of alcohol withdrawal seizures, dts, needing icu admission. denies withdrawal symptoms at this time. seen a few days ago for withdrawal and started on librium taper. has next appt with them tomorrow.

## 2019-10-16 NOTE — ED ADULT NURSE NOTE - INTERVENTIONS DEFINITIONS
Instruct patient to call for assistance/[place] to call system/Stretcher in lowest position, wheels locked, appropriate side rails in place

## 2019-10-16 NOTE — ED PROVIDER NOTE - NSFOLLOWUPINSTRUCTIONS_ED_ALL_ED_FT
Please go to your outpatient group 5x/week, and follow up with your therapist tomorrow as already planned.    Alcohol Abuse    Alcohol intoxication occurs when the amount of alcohol that a person has consumed impairs his or her ability to mentally and physically function. Chronic alcohol consumption can also lead to a variety of health issues including neurological disease, stomach disease, heart disease, liver disease, etc. Do not drive after drinking alcohol. Drinking enough alcohol to end up in an Emergency Room suggests you may have an alcohol abuse problem. Seek help at a drug addiction center.    SEEK IMMEDIATE MEDICAL CARE IF YOU HAVE ANY OF THE FOLLOWING SYMPTOMS: seizures, vomiting blood, blood in your stool, lightheadedness/dizziness, or becoming shaky to tremulous when you stop drinking.

## 2019-10-17 ENCOUNTER — EMERGENCY (EMERGENCY)
Facility: HOSPITAL | Age: 44
LOS: 1 days | Discharge: HOME | End: 2019-10-17
Attending: EMERGENCY MEDICINE | Admitting: EMERGENCY MEDICINE
Payer: MEDICAID

## 2019-10-17 VITALS
HEART RATE: 110 BPM | WEIGHT: 147.93 LBS | DIASTOLIC BLOOD PRESSURE: 84 MMHG | OXYGEN SATURATION: 97 % | SYSTOLIC BLOOD PRESSURE: 126 MMHG | RESPIRATION RATE: 18 BRPM | HEIGHT: 66 IN | TEMPERATURE: 98 F

## 2019-10-17 VITALS
OXYGEN SATURATION: 99 % | TEMPERATURE: 98 F | SYSTOLIC BLOOD PRESSURE: 124 MMHG | DIASTOLIC BLOOD PRESSURE: 74 MMHG | HEART RATE: 105 BPM | RESPIRATION RATE: 16 BRPM

## 2019-10-17 DIAGNOSIS — Z98.890 OTHER SPECIFIED POSTPROCEDURAL STATES: Chronic | ICD-10-CM

## 2019-10-17 LAB
ALBUMIN SERPL ELPH-MCNC: 4.8 G/DL — SIGNIFICANT CHANGE UP (ref 3.3–5)
ALP SERPL-CCNC: 65 U/L — SIGNIFICANT CHANGE UP (ref 40–120)
ALT FLD-CCNC: 73 U/L — HIGH (ref 10–45)
ANION GAP SERPL CALC-SCNC: 18 MMOL/L — HIGH (ref 5–17)
AST SERPL-CCNC: 91 U/L — HIGH (ref 10–40)
B-OH-BUTYR SERPL-SCNC: 0.9 MMOL/L — HIGH
BASOPHILS # BLD AUTO: 0.05 K/UL — SIGNIFICANT CHANGE UP (ref 0–0.2)
BASOPHILS NFR BLD AUTO: 0.8 % — SIGNIFICANT CHANGE UP (ref 0–2)
BILIRUB SERPL-MCNC: 0.9 MG/DL — SIGNIFICANT CHANGE UP (ref 0.2–1.2)
BUN SERPL-MCNC: 12 MG/DL — SIGNIFICANT CHANGE UP (ref 7–23)
CALCIUM SERPL-MCNC: 9 MG/DL — SIGNIFICANT CHANGE UP (ref 8.4–10.5)
CHLORIDE SERPL-SCNC: 101 MMOL/L — SIGNIFICANT CHANGE UP (ref 96–108)
CO2 SERPL-SCNC: 25 MMOL/L — SIGNIFICANT CHANGE UP (ref 22–31)
CREAT SERPL-MCNC: 0.56 MG/DL — SIGNIFICANT CHANGE UP (ref 0.5–1.3)
EOSINOPHIL # BLD AUTO: 0.09 K/UL — SIGNIFICANT CHANGE UP (ref 0–0.5)
EOSINOPHIL NFR BLD AUTO: 1.4 % — SIGNIFICANT CHANGE UP (ref 0–6)
ETHANOL SERPL-MCNC: 247 MG/DL — HIGH (ref 0–10)
GAS PNL BLDV: SIGNIFICANT CHANGE UP
GLUCOSE SERPL-MCNC: 71 MG/DL — SIGNIFICANT CHANGE UP (ref 70–99)
HCT VFR BLD CALC: 39.2 % — SIGNIFICANT CHANGE UP (ref 34.5–45)
HGB BLD-MCNC: 13 G/DL — SIGNIFICANT CHANGE UP (ref 11.5–15.5)
IMM GRANULOCYTES NFR BLD AUTO: 0.3 % — SIGNIFICANT CHANGE UP (ref 0–1.5)
LYMPHOCYTES # BLD AUTO: 1.6 K/UL — SIGNIFICANT CHANGE UP (ref 1–3.3)
LYMPHOCYTES # BLD AUTO: 24.6 % — SIGNIFICANT CHANGE UP (ref 13–44)
MCHC RBC-ENTMCNC: 32 PG — SIGNIFICANT CHANGE UP (ref 27–34)
MCHC RBC-ENTMCNC: 33.2 GM/DL — SIGNIFICANT CHANGE UP (ref 32–36)
MCV RBC AUTO: 96.6 FL — SIGNIFICANT CHANGE UP (ref 80–100)
MONOCYTES # BLD AUTO: 0.67 K/UL — SIGNIFICANT CHANGE UP (ref 0–0.9)
MONOCYTES NFR BLD AUTO: 10.3 % — SIGNIFICANT CHANGE UP (ref 2–14)
NEUTROPHILS # BLD AUTO: 4.08 K/UL — SIGNIFICANT CHANGE UP (ref 1.8–7.4)
NEUTROPHILS NFR BLD AUTO: 62.6 % — SIGNIFICANT CHANGE UP (ref 43–77)
NRBC # BLD: 0 /100 WBCS — SIGNIFICANT CHANGE UP (ref 0–0)
PLATELET # BLD AUTO: 147 K/UL — LOW (ref 150–400)
POTASSIUM SERPL-MCNC: 4.2 MMOL/L — SIGNIFICANT CHANGE UP (ref 3.5–5.3)
POTASSIUM SERPL-SCNC: 4.2 MMOL/L — SIGNIFICANT CHANGE UP (ref 3.5–5.3)
PROT SERPL-MCNC: 7.7 G/DL — SIGNIFICANT CHANGE UP (ref 6–8.3)
RBC # BLD: 4.06 M/UL — SIGNIFICANT CHANGE UP (ref 3.8–5.2)
RBC # FLD: 12.3 % — SIGNIFICANT CHANGE UP (ref 10.3–14.5)
SODIUM SERPL-SCNC: 144 MMOL/L — SIGNIFICANT CHANGE UP (ref 135–145)
WBC # BLD: 6.51 K/UL — SIGNIFICANT CHANGE UP (ref 3.8–10.5)
WBC # FLD AUTO: 6.51 K/UL — SIGNIFICANT CHANGE UP (ref 3.8–10.5)

## 2019-10-17 PROCEDURE — 84295 ASSAY OF SERUM SODIUM: CPT

## 2019-10-17 PROCEDURE — 82010 KETONE BODYS QUAN: CPT

## 2019-10-17 PROCEDURE — 99285 EMERGENCY DEPT VISIT HI MDM: CPT | Mod: 25

## 2019-10-17 PROCEDURE — 80053 COMPREHEN METABOLIC PANEL: CPT

## 2019-10-17 PROCEDURE — 85025 COMPLETE CBC W/AUTO DIFF WBC: CPT

## 2019-10-17 PROCEDURE — 82803 BLOOD GASES ANY COMBINATION: CPT

## 2019-10-17 PROCEDURE — 99284 EMERGENCY DEPT VISIT MOD MDM: CPT

## 2019-10-17 PROCEDURE — 96374 THER/PROPH/DIAG INJ IV PUSH: CPT

## 2019-10-17 PROCEDURE — 80307 DRUG TEST PRSMV CHEM ANLYZR: CPT

## 2019-10-17 PROCEDURE — 84132 ASSAY OF SERUM POTASSIUM: CPT

## 2019-10-17 PROCEDURE — 82330 ASSAY OF CALCIUM: CPT

## 2019-10-17 PROCEDURE — 36415 COLL VENOUS BLD VENIPUNCTURE: CPT

## 2019-10-17 RX ORDER — SODIUM CHLORIDE 9 MG/ML
1000 INJECTION, SOLUTION INTRAVENOUS
Refills: 0 | Status: DISCONTINUED | OUTPATIENT
Start: 2019-10-17 | End: 2019-10-22

## 2019-10-17 RX ORDER — THIAMINE MONONITRATE (VIT B1) 100 MG
100 TABLET ORAL ONCE
Refills: 0 | Status: COMPLETED | OUTPATIENT
Start: 2019-10-17 | End: 2019-10-17

## 2019-10-17 RX ADMIN — SODIUM CHLORIDE 250 MILLILITER(S): 9 INJECTION, SOLUTION INTRAVENOUS at 04:47

## 2019-10-17 RX ADMIN — Medication 100 MILLIGRAM(S): at 04:47

## 2019-10-17 NOTE — ED PROVIDER NOTE - PROGRESS NOTE DETAILS
Remains alert, oriented and appears well.  Clinically sober: fully oriented, with clear speech and steady gait.  No signs of withdrawal, CIWA remains 0.   Discharge to go directly to [facility] for detox evaluation, patient agrees to plan.

## 2019-10-17 NOTE — ED PROVIDER NOTE - NS ED ROS FT
CONSTITUTIONAL: No fever, chills, or weakness  NEURO: No headache/head fullness, no dizziness, no syncope; No focal weakness/tingling/numbness  EYES: No visual changes  ENT: No rhinorrhea or sore throat  PULM: No cough or dyspnea  CV: No chest pain or palpitations  GI: No abdominal pain, vomiting, or diarrhea  : No dysuria, hematuria, frequency  MSK: No neck pain or back pain, no joint pain  SKIN: no rash or unusual bruising

## 2019-10-17 NOTE — ED PROVIDER NOTE - OBJECTIVE STATEMENT
42 yo fem with Hx of alcohol abuse disorder, HTN presents with apparent intoxication.  This is 3rd ED visit here in past 3 days; seen here 2 days ago with concern for possible AKA but was able to be discharged after treatment. She was supposed to go to [facility] upon discharge to try to get into inpatient detox, but she says she was feeling well, so decided not to go.    She also has had several hospital admissions this year for alcohol-related illness.  She came to ED again yesterday after showing up to a GYN appointment intoxicated.  She was discharged about 8-9 hours ago, and says she drank again; admits to 1/4 liter of rum. She is on Librium 50 mg and took a dose last evening.  She also is on thiamine and folate supplements.  She says she feels okay right now, but is concerned about having tachycardia when she sandip up.  Has no palpitations now.  Her heart rate remained elevated in the low 100s even after 4 liters of IV fluids when I last saw her.  She had no other signs of withdrawal and her CIWA was 1.  She admits to being anxious sometimes, but not right now.  She denies headache, tremors, sweating, AH/VH or tactile disturbance.  She also denies feeling suicidal or homicidal.

## 2019-10-17 NOTE — ED ADULT TRIAGE NOTE - CHIEF COMPLAINT QUOTE
Presents to ED for medical evaluation of increased HR and detox.  Reports last drink today was at 4pm, 1/4liter of Rum.  Denies CP, SOB, n/v, abd pain, fevers, chills, auditory/visual/tactile hallucinations.  No tremors present during triage.

## 2019-10-17 NOTE — ED PROVIDER NOTE - PHYSICAL EXAMINATION
CONSTITUTIONAL: WD,WN. NAD.    SKIN: Normal color and turgor. No sweating.  HEAD: NC/AT.  EYES: Conjunctiva clear. EOMI. PERRL.  No nystagmus.    ENT: Airway patent, OP without erythema, tonsillar swelling or exudate; uvula midline without swelling. Nasal mucosa clear, no rhinorrhea.   RESPIRATORY:  Breathing non-labored. No retractions or accessory muscle use.  Lungs CTA bilat.  CARDIOVASCULAR:  RRR, S1S2. No M/R/G.      GI:  Abdomen soft, nontender.    MSK: Neck supple with painless ROM.  No lower extremity edema or calf tenderness.  No joint swelling or ROM limitation.  NEURO: Alert and oriented x 3; CN II-XII grossly intact. Speech clear. 5/5 strength in all extremities.  SILT throughout.  Normal balance and gait. No hand tremor or tongue fasciculations.

## 2019-10-17 NOTE — ED PROVIDER NOTE - PATIENT PORTAL LINK FT
You can access the FollowMyHealth Patient Portal offered by [facility] by registering at the following website: [email or web address]. By joining [employer]’s FollowMyHealth portal, you will also be able to view your health information using other applications (apps) compatible with our system.

## 2019-10-17 NOTE — ED PROVIDER NOTE - NSFOLLOWUPINSTRUCTIONS_ED_ALL_ED_FT
Go directly to:  Addiction Inpatient Detox & Rehabilitation Services  [facility]  [address] ([address])  [place] [address]  Tel: [phone]    Return to the Emergency Department if you have any new or worsening symptoms, or if you have any concerns.  ______________________________________________________________________    ALCOHOL INTOXICATION - AfterCare(R) Instructions(ER/ED)     Alcohol Intoxication    WHAT YOU NEED TO KNOW:    Alcohol intoxication is a harmful physical condition caused when you drink more alcohol than your body can handle. It is also called ethanol poisoning, or being drunk.    DISCHARGE INSTRUCTIONS:    Call your local emergency number (911 in the ) if:     You have sudden trouble breathing or chest pain.      You have a seizure.      You feel sad enough to harm yourself or others.    Call your doctor if:     You have hallucinations (you see or hear things that are not real).      You cannot stop vomiting.      You have questions or concerns about your condition or care.    Recommended alcohol limits:     Men 21 to 64 years should limit alcohol to 2 drinks a day. Do not have more than 4 drinks in 1 day or more than 14 in 1 week.      All women, and men 65 or older should limit alcohol to 1 drink in a day. Do not have more than 3 drinks in 1 day or more than 7 in 1 week. No amount of alcohol is okay during pregnancy.    Manage alcohol use:     Decrease the amount you drink. This can help prevent health problems such as brain, heart, and liver damage, high blood pressure, diabetes, and cancer. If you cannot stop completely, healthcare providers can help you set goals to decrease the amount you drink.      Plan weekly alcohol use. You will be less likely to drink more than the recommended limit if you plan ahead.      Have food when you drink alcohol. Food will prevent alcohol from getting into your system too quickly. Eat before you have your first alcohol drink.      Time your drinks carefully. Have no more than 1 drink in an hour. Have a liquid such as water, coffee, or a soft drink between alcohol drinks.      Do not drive if you have had alcohol. Make sure someone who has not been drinking can help you get home.      Do not drink alcohol if you are taking medicine. Alcohol is dangerous when you combine it with certain medicines, such as acetaminophen or blood pressure medicine. Talk to your healthcare provider about all the medicines you currently take.    For more information:     Alcoholics Anonymous  Web Address: http://www.aa.org      Substance Abuse and Mental Health Services Administration  [address]  [place] [address]  Web Address: http://www.[facility].gov      Follow up with your healthcare provider as directed: Write down your questions so you remember to ask them during your visits.

## 2019-10-17 NOTE — ED PROVIDER NOTE - ATTENDING CONTRIBUTION TO CARE
43F hx etoh abuse, htn, c/o feeling unwell tonight. third visit over past few days.  +alcohol intoxication. pt states currently on librium, however still drinking.  no tremors. no vomiting.    gen- nad  heent- ncat, clear conj  cv -tachy  lungs -ctab  abd - soft, nt, nd  ext -wwp, no edema  neuro -aox3, munoz  no evidence of overt alcohol withdrawal, will give hydration, reiterated the dangers of excessive daily alcohol ingestion.

## 2019-10-17 NOTE — ED PROVIDER NOTE - CLINICAL SUMMARY MEDICAL DECISION MAKING FREE TEXT BOX
alcohol intoxication appears to be moderate - no evidence of withdrawal.  recently here with suspected AKA.  recurrent visits due to palpitations and has persistent tachycardia, even with low CIWA scores.  mildly tachycardic without other withdrawal symptoms now.  CIWA score 0.  she would likely be best off admitted to an inpatient detox program, as she continues to drink as soon as she sandip up, despite already being on Librium.  will check labs, give thiamine and IV fluids, and reassess  for sobriety.

## 2019-10-22 DIAGNOSIS — R41.82 ALTERED MENTAL STATUS, UNSPECIFIED: ICD-10-CM

## 2019-10-22 DIAGNOSIS — F10.120 ALCOHOL ABUSE WITH INTOXICATION, UNCOMPLICATED: ICD-10-CM

## 2019-10-22 DIAGNOSIS — Z79.899 OTHER LONG TERM (CURRENT) DRUG THERAPY: ICD-10-CM

## 2019-10-22 DIAGNOSIS — Z88.8 ALLERGY STATUS TO OTHER DRUGS, MEDICAMENTS AND BIOLOGICAL SUBSTANCES STATUS: ICD-10-CM

## 2019-10-22 DIAGNOSIS — F10.129 ALCOHOL ABUSE WITH INTOXICATION, UNSPECIFIED: ICD-10-CM

## 2019-10-22 DIAGNOSIS — I10 ESSENTIAL (PRIMARY) HYPERTENSION: ICD-10-CM

## 2019-10-31 PROCEDURE — 86901 BLOOD TYPING SEROLOGIC RH(D): CPT

## 2019-10-31 PROCEDURE — 36415 COLL VENOUS BLD VENIPUNCTURE: CPT

## 2019-10-31 PROCEDURE — 86900 BLOOD TYPING SEROLOGIC ABO: CPT

## 2019-10-31 PROCEDURE — 96376 TX/PRO/DX INJ SAME DRUG ADON: CPT

## 2019-10-31 PROCEDURE — 85025 COMPLETE CBC W/AUTO DIFF WBC: CPT

## 2019-10-31 PROCEDURE — 93005 ELECTROCARDIOGRAM TRACING: CPT

## 2019-10-31 PROCEDURE — 86850 RBC ANTIBODY SCREEN: CPT

## 2019-10-31 PROCEDURE — 99285 EMERGENCY DEPT VISIT HI MDM: CPT | Mod: 25

## 2019-10-31 PROCEDURE — 96374 THER/PROPH/DIAG INJ IV PUSH: CPT

## 2019-10-31 PROCEDURE — 71045 X-RAY EXAM CHEST 1 VIEW: CPT

## 2019-10-31 PROCEDURE — 83690 ASSAY OF LIPASE: CPT

## 2019-10-31 PROCEDURE — 80053 COMPREHEN METABOLIC PANEL: CPT

## 2019-10-31 PROCEDURE — 80307 DRUG TEST PRSMV CHEM ANLYZR: CPT

## 2019-10-31 PROCEDURE — 82550 ASSAY OF CK (CPK): CPT

## 2019-10-31 PROCEDURE — 82553 CREATINE MB FRACTION: CPT

## 2019-10-31 PROCEDURE — 80048 BASIC METABOLIC PNL TOTAL CA: CPT

## 2019-10-31 PROCEDURE — 84702 CHORIONIC GONADOTROPIN TEST: CPT

## 2019-10-31 PROCEDURE — 84484 ASSAY OF TROPONIN QUANT: CPT

## 2019-10-31 PROCEDURE — 83735 ASSAY OF MAGNESIUM: CPT

## 2019-12-20 ENCOUNTER — EMERGENCY (EMERGENCY)
Facility: HOSPITAL | Age: 44
LOS: 1 days | Discharge: HOME | End: 2019-12-20
Attending: EMERGENCY MEDICINE
Payer: SELF-PAY

## 2019-12-20 VITALS
RESPIRATION RATE: 18 BRPM | SYSTOLIC BLOOD PRESSURE: 127 MMHG | DIASTOLIC BLOOD PRESSURE: 83 MMHG | HEART RATE: 100 BPM | OXYGEN SATURATION: 97 % | TEMPERATURE: 99 F

## 2019-12-20 VITALS
DIASTOLIC BLOOD PRESSURE: 72 MMHG | OXYGEN SATURATION: 99 % | RESPIRATION RATE: 18 BRPM | WEIGHT: 119.93 LBS | TEMPERATURE: 98 F | SYSTOLIC BLOOD PRESSURE: 125 MMHG | HEART RATE: 64 BPM

## 2019-12-20 PROCEDURE — 82962 GLUCOSE BLOOD TEST: CPT

## 2019-12-20 PROCEDURE — 99285 EMERGENCY DEPT VISIT HI MDM: CPT

## 2019-12-20 PROCEDURE — 99284 EMERGENCY DEPT VISIT MOD MDM: CPT

## 2019-12-20 RX ADMIN — Medication 25 MILLIGRAM(S): at 21:47

## 2019-12-20 NOTE — ED PROVIDER NOTE - CLINICAL SUMMARY MEDICAL DECISION MAKING FREE TEXT BOX
Suspicious for ETOH intoxication. Patient with no signs of drugs, or acute medical illnesses. Will observe for neuro improvements. Concern for ETOH intoxication. Patient with no signs of drug use, psychosis, suicidality or trauma. Will observe for neuro improvement and consult TRI.

## 2019-12-20 NOTE — ED ADULT NURSE NOTE - NSIMPLEMENTINTERV_GEN_ALL_ED
Implemented All Fall with Harm Risk Interventions:  [place] to call system. Call bell, personal items and telephone within reach. Instruct patient to call for assistance. Room bathroom lighting operational. Non-slip footwear when patient is off stretcher. Physically safe environment: no spills, clutter or unnecessary equipment. Stretcher in lowest position, wheels locked, appropriate side rails in place. Provide visual cue, wrist band, yellow gown, etc. Monitor gait and stability. Monitor for mental status changes and reorient to person, place, and time. Review medications for side effects contributing to fall risk. Reinforce activity limits and safety measures with patient and family. Provide visual clues: red socks.

## 2019-12-20 NOTE — ED ADULT NURSE REASSESSMENT NOTE - NS ED NURSE REASSESS COMMENT FT1
received pt aaox3,not in acute distress,with saline lock intact,no redness no swelling noted, with RA and yellow gown,pt wants to wait for .pt ambulates to the bathroom with a steady gait.

## 2019-12-20 NOTE — ED ADULT NURSE NOTE - OBJECTIVE STATEMENT
c/o " I had 3 glasses of rum at the airport before trying to board a plane and I was too drunk to get on the plane"

## 2019-12-20 NOTE — ED PROVIDER NOTE - OBJECTIVE STATEMENT
43 y/o F patient with a significant PMHx of EtOH abuse presents to the ED from airport via EMS for alcohol intoxication. Patient was found visibly drunk and was about to board flight. Patient states she missed flight yesterday due to waking up late. Patient notes having 3 drinks at the airport. Patient has history of EtOH abuse with multiple rehab visits (last one in October). Patient denies any drug use, SI, HI or any other complaints. allergies: prednisolone.

## 2019-12-20 NOTE — ED PROVIDER NOTE - PATIENT PORTAL LINK FT
You can access the FollowMyHealth Patient Portal offered by [facility] by registering at the following website: [email or web address]. By joining [employer]’s FollowMyHealth portal, you will also be able to view your health information using other applications (apps) compatible with our system.

## 2019-12-20 NOTE — ED PROVIDER NOTE - PROGRESS NOTE DETAILS
Pt spoke with parents at [place], spoke with long time neighbor/friend who will help make sure she wakes up for flight. Pt states she has h/o tachycardia, CIWA scale 0, no signs or withdrawal. No pain, fever, dizziness or SOB.

## 2020-01-01 ENCOUNTER — OUTPATIENT (OUTPATIENT)
Dept: OUTPATIENT SERVICES | Facility: HOSPITAL | Age: 45
LOS: 1 days | End: 2020-01-01
Payer: MEDICAID

## 2020-01-01 DIAGNOSIS — Z98.890 OTHER SPECIFIED POSTPROCEDURAL STATES: Chronic | ICD-10-CM

## 2020-01-15 ENCOUNTER — INPATIENT (INPATIENT)
Facility: HOSPITAL | Age: 45
LOS: 0 days | Discharge: LEFT BEFORE DISCHARGE | DRG: 894 | End: 2020-01-16
Attending: INTERNAL MEDICINE | Admitting: INTERNAL MEDICINE
Payer: MEDICAID

## 2020-01-15 VITALS
SYSTOLIC BLOOD PRESSURE: 146 MMHG | OXYGEN SATURATION: 96 % | RESPIRATION RATE: 18 BRPM | HEIGHT: 66 IN | DIASTOLIC BLOOD PRESSURE: 87 MMHG | HEART RATE: 143 BPM | WEIGHT: 143.08 LBS | TEMPERATURE: 98 F

## 2020-01-15 DIAGNOSIS — E87.2 ACIDOSIS: ICD-10-CM

## 2020-01-15 DIAGNOSIS — Z98.890 OTHER SPECIFIED POSTPROCEDURAL STATES: Chronic | ICD-10-CM

## 2020-01-15 LAB
ALBUMIN SERPL ELPH-MCNC: 5 G/DL — SIGNIFICANT CHANGE UP (ref 3.3–5)
ALP SERPL-CCNC: 77 U/L — SIGNIFICANT CHANGE UP (ref 40–120)
ALT FLD-CCNC: 37 U/L — SIGNIFICANT CHANGE UP (ref 10–45)
ANION GAP SERPL CALC-SCNC: 24 MMOL/L — HIGH (ref 5–17)
APPEARANCE UR: ABNORMAL
AST SERPL-CCNC: 58 U/L — HIGH (ref 10–40)
BASOPHILS # BLD AUTO: 0.08 K/UL — SIGNIFICANT CHANGE UP (ref 0–0.2)
BASOPHILS NFR BLD AUTO: 0.9 % — SIGNIFICANT CHANGE UP (ref 0–2)
BILIRUB SERPL-MCNC: 0.6 MG/DL — SIGNIFICANT CHANGE UP (ref 0.2–1.2)
BILIRUB UR-MCNC: NEGATIVE — SIGNIFICANT CHANGE UP
BUN SERPL-MCNC: 12 MG/DL — SIGNIFICANT CHANGE UP (ref 7–23)
CALCIUM SERPL-MCNC: 8.9 MG/DL — SIGNIFICANT CHANGE UP (ref 8.4–10.5)
CHLORIDE SERPL-SCNC: 97 MMOL/L — SIGNIFICANT CHANGE UP (ref 96–108)
CO2 SERPL-SCNC: 19 MMOL/L — LOW (ref 22–31)
COLOR SPEC: YELLOW — SIGNIFICANT CHANGE UP
CREAT SERPL-MCNC: 0.64 MG/DL — SIGNIFICANT CHANGE UP (ref 0.5–1.3)
DIFF PNL FLD: ABNORMAL
EOSINOPHIL # BLD AUTO: 0 K/UL — SIGNIFICANT CHANGE UP (ref 0–0.5)
EOSINOPHIL NFR BLD AUTO: 0 % — SIGNIFICANT CHANGE UP (ref 0–6)
ETHANOL SERPL-MCNC: 296 MG/DL — HIGH (ref 0–10)
GLUCOSE SERPL-MCNC: 100 MG/DL — HIGH (ref 70–99)
GLUCOSE UR QL: NEGATIVE — SIGNIFICANT CHANGE UP
HCG SERPL-ACNC: <0 MIU/ML — SIGNIFICANT CHANGE UP
HCT VFR BLD CALC: 50.5 % — HIGH (ref 34.5–45)
HGB BLD-MCNC: 17.4 G/DL — HIGH (ref 11.5–15.5)
IMM GRANULOCYTES NFR BLD AUTO: 0.4 % — SIGNIFICANT CHANGE UP (ref 0–1.5)
KETONES UR-MCNC: 40 MG/DL
LEUKOCYTE ESTERASE UR-ACNC: NEGATIVE — SIGNIFICANT CHANGE UP
LYMPHOCYTES # BLD AUTO: 2.42 K/UL — SIGNIFICANT CHANGE UP (ref 1–3.3)
LYMPHOCYTES # BLD AUTO: 27 % — SIGNIFICANT CHANGE UP (ref 13–44)
MCHC RBC-ENTMCNC: 32.2 PG — SIGNIFICANT CHANGE UP (ref 27–34)
MCHC RBC-ENTMCNC: 34.5 GM/DL — SIGNIFICANT CHANGE UP (ref 32–36)
MCV RBC AUTO: 93.5 FL — SIGNIFICANT CHANGE UP (ref 80–100)
MONOCYTES # BLD AUTO: 0.5 K/UL — SIGNIFICANT CHANGE UP (ref 0–0.9)
MONOCYTES NFR BLD AUTO: 5.6 % — SIGNIFICANT CHANGE UP (ref 2–14)
NEUTROPHILS # BLD AUTO: 5.91 K/UL — SIGNIFICANT CHANGE UP (ref 1.8–7.4)
NEUTROPHILS NFR BLD AUTO: 66.1 % — SIGNIFICANT CHANGE UP (ref 43–77)
NITRITE UR-MCNC: NEGATIVE — SIGNIFICANT CHANGE UP
NRBC # BLD: 0 /100 WBCS — SIGNIFICANT CHANGE UP (ref 0–0)
PCP SPEC-MCNC: SIGNIFICANT CHANGE UP
PH UR: 6 — SIGNIFICANT CHANGE UP (ref 5–8)
PLATELET # BLD AUTO: 358 K/UL — SIGNIFICANT CHANGE UP (ref 150–400)
POTASSIUM SERPL-MCNC: 4.1 MMOL/L — SIGNIFICANT CHANGE UP (ref 3.5–5.3)
POTASSIUM SERPL-SCNC: 4.1 MMOL/L — SIGNIFICANT CHANGE UP (ref 3.5–5.3)
PROT SERPL-MCNC: 8.3 G/DL — SIGNIFICANT CHANGE UP (ref 6–8.3)
PROT UR-MCNC: ABNORMAL MG/DL
RBC # BLD: 5.4 M/UL — HIGH (ref 3.8–5.2)
RBC # FLD: 12.2 % — SIGNIFICANT CHANGE UP (ref 10.3–14.5)
SODIUM SERPL-SCNC: 140 MMOL/L — SIGNIFICANT CHANGE UP (ref 135–145)
SP GR SPEC: >=1.03 — SIGNIFICANT CHANGE UP (ref 1–1.03)
UROBILINOGEN FLD QL: 0.2 E.U./DL — SIGNIFICANT CHANGE UP
WBC # BLD: 8.95 K/UL — SIGNIFICANT CHANGE UP (ref 3.8–10.5)
WBC # FLD AUTO: 8.95 K/UL — SIGNIFICANT CHANGE UP (ref 3.8–10.5)

## 2020-01-15 PROCEDURE — 99285 EMERGENCY DEPT VISIT HI MDM: CPT

## 2020-01-15 RX ORDER — SODIUM CHLORIDE 9 MG/ML
2000 INJECTION INTRAMUSCULAR; INTRAVENOUS; SUBCUTANEOUS ONCE
Refills: 0 | Status: COMPLETED | OUTPATIENT
Start: 2020-01-15 | End: 2020-01-15

## 2020-01-15 RX ORDER — SODIUM CHLORIDE 9 MG/ML
1000 INJECTION INTRAMUSCULAR; INTRAVENOUS; SUBCUTANEOUS
Refills: 0 | Status: DISCONTINUED | OUTPATIENT
Start: 2020-01-15 | End: 2020-01-16

## 2020-01-15 RX ORDER — AMLODIPINE BESYLATE 2.5 MG/1
2.5 TABLET ORAL DAILY
Refills: 0 | Status: DISCONTINUED | OUTPATIENT
Start: 2020-01-15 | End: 2020-01-16

## 2020-01-15 RX ORDER — THIAMINE MONONITRATE (VIT B1) 100 MG
100 TABLET ORAL EVERY 24 HOURS
Refills: 0 | Status: DISCONTINUED | OUTPATIENT
Start: 2020-01-15 | End: 2020-01-16

## 2020-01-15 RX ORDER — SODIUM CHLORIDE 9 MG/ML
1000 INJECTION INTRAMUSCULAR; INTRAVENOUS; SUBCUTANEOUS ONCE
Refills: 0 | Status: COMPLETED | OUTPATIENT
Start: 2020-01-15 | End: 2020-01-15

## 2020-01-15 RX ORDER — FOLIC ACID 0.8 MG
1 TABLET ORAL DAILY
Refills: 0 | Status: DISCONTINUED | OUTPATIENT
Start: 2020-01-15 | End: 2020-01-16

## 2020-01-15 RX ORDER — ENOXAPARIN SODIUM 100 MG/ML
40 INJECTION SUBCUTANEOUS EVERY 24 HOURS
Refills: 0 | Status: DISCONTINUED | OUTPATIENT
Start: 2020-01-15 | End: 2020-01-16

## 2020-01-15 RX ADMIN — SODIUM CHLORIDE 2000 MILLILITER(S): 9 INJECTION INTRAMUSCULAR; INTRAVENOUS; SUBCUTANEOUS at 13:18

## 2020-01-15 RX ADMIN — SODIUM CHLORIDE 100 MILLILITER(S): 9 INJECTION INTRAMUSCULAR; INTRAVENOUS; SUBCUTANEOUS at 19:21

## 2020-01-15 RX ADMIN — Medication 1 MILLIGRAM(S): at 13:18

## 2020-01-15 RX ADMIN — Medication 25 MILLIGRAM(S): at 18:09

## 2020-01-15 RX ADMIN — Medication 25 MILLIGRAM(S): at 21:50

## 2020-01-15 RX ADMIN — Medication 0.5 MILLIGRAM(S): at 15:29

## 2020-01-15 RX ADMIN — Medication 100 MILLIGRAM(S): at 21:50

## 2020-01-15 RX ADMIN — SODIUM CHLORIDE 1000 MILLILITER(S): 9 INJECTION INTRAMUSCULAR; INTRAVENOUS; SUBCUTANEOUS at 15:03

## 2020-01-15 NOTE — H&P ADULT - PROBLEM SELECTOR PLAN 5
DVT PPX: IMPROVE 0, no ppx  ETHICS: Full Code  DISPOSITION: [facility]. DVT PPX:  DISPOSITION: [facility]. DVT PPX: Lovenox 40mg subQ q24hrs  DISPOSITION: [facility].

## 2020-01-15 NOTE — H&P ADULT - PROBLEM SELECTOR PLAN 3
Pt has been in detox before, wants to go back on naltrexone when she goes home   , ALT 80. Likely 2/2 to alcohol use.   - f/u AM CMP. Multiple admissions for EtOH withdrawal in the past, no prior history of withdrawal seizures, DTs. Pt has been in detox before, currently is in counseling since August 2019. Stopped taking naltrexone for one week.

## 2020-01-15 NOTE — H&P ADULT - PROBLEM SELECTOR PLAN 2
atient with history of HTN, on Amlodipine 2. 5 mg daily. Normotensive.   - f/u AM EKG  - Continue with medication. history of HTN, on Amlodipine 2.5 mg daily, normotensive.   - Amlodipine 2.5 mg daily

## 2020-01-15 NOTE — H&P ADULT - NSHPSOCIALHISTORY_GEN_ALL_CORE
Drinks 1/2L of rum nightly for 8 hours. Denies smoking or drug use. Lives alone. Currently unemployed

## 2020-01-15 NOTE — ED PROVIDER NOTE - OBJECTIVE STATEMENT
44 year old female with history of alcohol abuse presents to ED with concern for palpitations today.  Patient notes her last drink was at 6am at which point she consumed 2 shots of hard liquor.  She denies history of DTs and states she has been abusing alcohol intermittently for 8 years.  She denies associated chest pain, headache, visual changes, fever, chills, shortness of breath, abdominal pain, nausea, emesis, changes to bowel movements, peripheral edema, extremity weakness, extremity pain or any additional acute complaints or concerns at this time.  She notes trying to "get clean" many times in the past and has been unsuccessful.  She notes multiple past ED visits and hospitalizations in the past for same.

## 2020-01-15 NOTE — H&P ADULT - PROBLEM SELECTOR PLAN 4
F: 100cc NS  E: Replete electrolytes PRN  N: DASH/TLC. F: s/p 3L NS   E: Replete K<4, Mg<2  N: DASH/TLC

## 2020-01-15 NOTE — H&P ADULT - HISTORY OF PRESENT ILLNESS
43F PMH HTN,  EtOH abuse with multiple prior admissions, presents with palpitations after drinking 2 shots of rum early this morning. She states that she drinks 1/2 liter of rum nightly for 8 years  She denies any vomiting, fever, chills, CP, SOB, LE edema, urinary changes. She endorse intermittent HA and palpitations t  currently She states that she was last in the hospital in January for ETOH withdrawal. She quit drinking since then, however in May started etoh again for a few days. She weaned herself off and was taking naltrexone at home. She picked up etoh two weeks ago and has been drinking daily since then. She states she drinks about a pint a night, but cant be clear. Her last drink was this AM with rum. She states her last meal was monday, and she has intermittent nausea. She denies any vomiting, fever, chills, CP, SOB, LE edema, urinary changes. She endorse intermittent HA and palpitations that started the past three days. She is a fasion  who is currently unemployed    ED VS: T 98.2F  /87RR 18-22; Sp02 96% RA  ED Course: s/p 3L NS, Ativan 1 mg IVP x1 and 0.5mg x1, EKG sinus tachy, labs with no leukocytosis, notable for an AG 24, EtOH 296, Pt admitted for monitoring of ETOH withdrawal. 43F PMH HTN, EtOH abuse with multiple prior admissions, presents with palpitations after drinking 2 shots of rum early this morning. She states that she drinks 1/2 liter of rum nightly for 8 years. She reports that starting in August, she began counseling and was sober for 1 month. She began drinking again last Tuesday and resumed 1/2 liter of rum nightly. This morning she said that she had trouble sleeping so she decided to drink two more shots of rum. After that she started feeling palpitations. She measured her blood pressure and heart rate and noticed it was high. She does not report any seizures/ICU/intubation. One week ago she reports she stopped taking Naltrexone because she felt it wasn't helping decrease her alcohol use. Otherwise, she denies any nausea, vomiting, fever, chills, CP, SOB, LE edema, urinary changes. She endorses palpitations but denies feeling anxious or having any auditory or visual disturbances.    ED VS: T 98.2F  /87RR 18-22; SpO2 96% RA  ED Course: s/p 3L NS, Ativan 1 mg IVP x1 and 0.5mg x1, EKG sinus tachy, labs with no leukocytosis, notable for an AG 24, EtOH 296, Pt admitted for monitoring of ETOH withdrawal.

## 2020-01-15 NOTE — H&P ADULT - NSHPPHYSICALEXAM_GEN_ALL_CORE
Vital Signs Last 12 Hrs  T(F): 98.2 (01-15-20 @ 12:52), Max: 98.2 (01-15-20 @ 12:52)  HR: 114 (01-15-20 @ 14:54) (114 - 143)  BP: 136/78 (01-15-20 @ 14:54) (136/78 - 146/87)  BP(mean): --  RR: 17 (01-15-20 @ 14:54) (17 - 18)  SpO2: 98% (01-15-20 @ 14:54) (96% - 98%)    PHYSICAL EXAM:  Constitutional: anxious appearing, comfortable in bed.  HEENT: NC/AT, PERRLA, EOMI, no conjunctival pallor or scleral icterus, MMM  Neck: Supple, no JVD  Respiratory: CTA B/L. No w/r/r.   Cardiovascular: tachycardic, regular rhythm, normal S1 and S2, no m/r/g.   Gastrointestinal: +BS, soft NTND, no guarding or rebound tenderness, no palpable masses   Extremities: wwp; no cyanosis, clubbing or edema.   Vascular: Pulses equal and strong throughout.   Neurological: AAOx3, no CN deficits, strength and sensation intact throughout.   Skin: No gross skin abnormalities or rashes

## 2020-01-15 NOTE — ED PROVIDER NOTE - CLINICAL SUMMARY MEDICAL DECISION MAKING FREE TEXT BOX
Patient in ED w concern for ETOH w/d.  Last drink was this morning - noting 2 shots of hard liquor.  Patient tremulous and tachycardic on arrival to ED.  She is given IVF bolus, ativan and monitored in ED.  ETOH level noted.  Given vital signs and dehydration by labs, will plan for admission with continued control of w/d symptoms and hydration.  Patient is aware of plan and in agreement.  Will admit at this time.

## 2020-01-15 NOTE — H&P ADULT - ASSESSMENT
A 44 y/o Female with PMH of EtOH abuse, HTN presents to [facility] with concerns of alcohol withdrawal, , diaphoresis, and palpitations after her last drink this morning, admitted for management of ETOH abuse and concern for withdrawal. 43F PMH of EtOH abuse, HTN presents with palpitations after her last drink this morning, admitted for management of ETOH abuse and concern for withdrawal.

## 2020-01-15 NOTE — ED ADULT NURSE NOTE - OBJECTIVE STATEMENT
Pt is a 45 y/o female c/o palpitations. Pt reports drinking three liters of Vodka in the past week, last drink at 6 am. Pt denies N/V, h/a, visual/tactile/auditory hallucinations. Pt moderately anxious with tremors. Pt placed on continuous cardiac monitor, 's, pt upgraded to MD Barnes.

## 2020-01-15 NOTE — H&P ADULT - PROBLEM SELECTOR PLAN 1
Patient presenting to [facility] ED with complaints of anxiety, palpitations, long-standing history of EtOH abuse , no prior history of withdrawal seizures, DTs  - EtOH level on admission 183; last drink this morning  - s/p Librium 25 mg PO x1, Ativan 1 mg IVP x4  - CIWA 3 for mild anxiety, and tremor during encounter.  - CIWA Protocol, monitor q4h  - Start Librium 25 mg q8h  - Start Ativan 1 mg IVP q2h PRN for CIWA >8  - Fall Precautions   - MV/Folic Acid/Thiamine Patient presenting  with palpitations, long-standing history of EtOH abuse (drinks 1/2 rum for the last 8 years), multiple admissions for EtOH withdrawal in the past, no prior history of withdrawal seizures, DTs ; EtOH level on admission 296; last drink: 6 am on 1/15; s/p Ativan 1 mg IVP x1 and 0.5mg x1, s/p 3 L NS in ED, CIWA 2 for mild tremors  -Librium 25 mg q8h  -Ativan 2 mg IVP PRN for CIWA >8  -CIWA Protocol, monitor q2-4hrs  -Folic Acid/MVI/Thiamine Patient presenting  with palpitations, long-standing history of EtOH abuse (drinks 1/2 rum for the last 8 years), multiple admissions for EtOH withdrawal in the past, no seizures/ICU/intubations/DTs; EtOH level on admission 296; last drink: 6 am on 1/15; s/p Ativan 1 mg IVP x1 and 0.5mg x1, s/p 3 L NS in ED, CIWA 2 for mild tremors  - Librium 25 mg q8h  - Ativan 2 mg IVP PRN for CIWA >8  - CIWA Protocol, monitor q2-4hrs  - Folic Acid/MVI/Thiamine

## 2020-01-15 NOTE — PATIENT PROFILE ADULT - ARRIVAL FROM
Home Dapsone Counseling: I discussed with the patient the risks of dapsone including but not limited to hemolytic anemia, agranulocytosis, rashes, methemoglobinemia, kidney failure, peripheral neuropathy, headaches, GI upset, and liver toxicity.  Patients who start dapsone require monitoring including baseline LFTs and weekly CBCs for the first month, then every month thereafter.  The patient verbalized understanding of the proper use and possible adverse effects of dapsone.  All of the patient's questions and concerns were addressed.

## 2020-01-15 NOTE — H&P ADULT - NSHPLABSRESULTS_GEN_ALL_CORE
LABS:                         17.4   8.95  )-----------( 358      ( 15 Sunil 2020 13:22 )             50.5     01-15    140  |  97  |  12  ----------------------------<  100<H>  4.1   |  19<L>  |  0.64    Ca    8.9      15 Sunil 2020 13:22    TPro  8.3  /  Alb  5.0  /  TBili  0.6  /  DBili  x   /  AST  58<H>  /  ALT  37  /  AlkPhos  77  01-15                  RADIOLOGY, EKG & ADDITIONAL TESTS:

## 2020-01-15 NOTE — ED PROVIDER NOTE - CONSTITUTIONAL, MLM
normal... Anxious appearing, awake, alert, oriented to person, place, time/situation and in no apparent distress.

## 2020-01-16 ENCOUNTER — TRANSCRIPTION ENCOUNTER (OUTPATIENT)
Age: 45
End: 2020-01-16

## 2020-01-16 VITALS
HEART RATE: 87 BPM | SYSTOLIC BLOOD PRESSURE: 147 MMHG | TEMPERATURE: 98 F | RESPIRATION RATE: 16 BRPM | OXYGEN SATURATION: 98 % | DIASTOLIC BLOOD PRESSURE: 86 MMHG

## 2020-01-16 LAB
ANION GAP SERPL CALC-SCNC: 12 MMOL/L — SIGNIFICANT CHANGE UP (ref 5–17)
BUN SERPL-MCNC: 10 MG/DL — SIGNIFICANT CHANGE UP (ref 7–23)
CALCIUM SERPL-MCNC: 7.8 MG/DL — LOW (ref 8.4–10.5)
CHLORIDE SERPL-SCNC: 107 MMOL/L — SIGNIFICANT CHANGE UP (ref 96–108)
CO2 SERPL-SCNC: 21 MMOL/L — LOW (ref 22–31)
CREAT SERPL-MCNC: 0.55 MG/DL — SIGNIFICANT CHANGE UP (ref 0.5–1.3)
GLUCOSE SERPL-MCNC: 112 MG/DL — HIGH (ref 70–99)
HCT VFR BLD CALC: 36.7 % — SIGNIFICANT CHANGE UP (ref 34.5–45)
HGB BLD-MCNC: 12.7 G/DL — SIGNIFICANT CHANGE UP (ref 11.5–15.5)
MAGNESIUM SERPL-MCNC: 1.8 MG/DL — SIGNIFICANT CHANGE UP (ref 1.6–2.6)
MCHC RBC-ENTMCNC: 32.7 PG — SIGNIFICANT CHANGE UP (ref 27–34)
MCHC RBC-ENTMCNC: 34.6 GM/DL — SIGNIFICANT CHANGE UP (ref 32–36)
MCV RBC AUTO: 94.6 FL — SIGNIFICANT CHANGE UP (ref 80–100)
NRBC # BLD: 0 /100 WBCS — SIGNIFICANT CHANGE UP (ref 0–0)
PHOSPHATE SERPL-MCNC: 1.7 MG/DL — LOW (ref 2.5–4.5)
PLATELET # BLD AUTO: 196 K/UL — SIGNIFICANT CHANGE UP (ref 150–400)
POTASSIUM SERPL-MCNC: 3.7 MMOL/L — SIGNIFICANT CHANGE UP (ref 3.5–5.3)
POTASSIUM SERPL-SCNC: 3.7 MMOL/L — SIGNIFICANT CHANGE UP (ref 3.5–5.3)
RBC # BLD: 3.88 M/UL — SIGNIFICANT CHANGE UP (ref 3.8–5.2)
RBC # FLD: 12.5 % — SIGNIFICANT CHANGE UP (ref 10.3–14.5)
SODIUM SERPL-SCNC: 140 MMOL/L — SIGNIFICANT CHANGE UP (ref 135–145)
WBC # BLD: 5.82 K/UL — SIGNIFICANT CHANGE UP (ref 3.8–10.5)
WBC # FLD AUTO: 5.82 K/UL — SIGNIFICANT CHANGE UP (ref 3.8–10.5)

## 2020-01-16 PROCEDURE — 85027 COMPLETE CBC AUTOMATED: CPT

## 2020-01-16 PROCEDURE — 84100 ASSAY OF PHOSPHORUS: CPT

## 2020-01-16 PROCEDURE — 85025 COMPLETE CBC W/AUTO DIFF WBC: CPT

## 2020-01-16 PROCEDURE — 36415 COLL VENOUS BLD VENIPUNCTURE: CPT

## 2020-01-16 PROCEDURE — 83735 ASSAY OF MAGNESIUM: CPT

## 2020-01-16 PROCEDURE — 80307 DRUG TEST PRSMV CHEM ANLYZR: CPT

## 2020-01-16 PROCEDURE — 80053 COMPREHEN METABOLIC PANEL: CPT

## 2020-01-16 PROCEDURE — 99285 EMERGENCY DEPT VISIT HI MDM: CPT | Mod: 25

## 2020-01-16 PROCEDURE — 80048 BASIC METABOLIC PNL TOTAL CA: CPT

## 2020-01-16 PROCEDURE — 96374 THER/PROPH/DIAG INJ IV PUSH: CPT

## 2020-01-16 PROCEDURE — 84702 CHORIONIC GONADOTROPIN TEST: CPT

## 2020-01-16 PROCEDURE — 81001 URINALYSIS AUTO W/SCOPE: CPT

## 2020-01-16 RX ORDER — SODIUM,POTASSIUM PHOSPHATES 278-250MG
1 POWDER IN PACKET (EA) ORAL ONCE
Refills: 0 | Status: DISCONTINUED | OUTPATIENT
Start: 2020-01-16 | End: 2020-01-16

## 2020-01-16 RX ADMIN — Medication 25 MILLIGRAM(S): at 05:52

## 2020-01-16 RX ADMIN — SODIUM CHLORIDE 100 MILLILITER(S): 9 INJECTION INTRAMUSCULAR; INTRAVENOUS; SUBCUTANEOUS at 05:52

## 2020-01-16 RX ADMIN — AMLODIPINE BESYLATE 2.5 MILLIGRAM(S): 2.5 TABLET ORAL at 05:52

## 2020-01-16 RX ADMIN — ENOXAPARIN SODIUM 40 MILLIGRAM(S): 100 INJECTION SUBCUTANEOUS at 09:17

## 2020-01-16 NOTE — DISCHARGE NOTE PROVIDER - NSDCFUADDAPPT_GEN_ALL_CORE_FT
Please follow up with your primary care provider within 7-10 days of discharge. Please call his/her office at your earliest convenience to schedule your appointment.

## 2020-01-16 NOTE — DISCHARGE NOTE PROVIDER - HOSPITAL COURSE
Patient is 42 yo F with past medical history of HTN, alcohol abuse & withdrawal        Presented with palpitations after her last drink, admitted for management of alcohol abuse and concern for withdrawal        Problem List/Main Diagnoses (system-based):     - Acute alcohol intoxication with concern for withdrawal: patient with palpitations, long-standing hx of alcohol abuse (1/2 liter of run daily x 8 years), multiple admissions in the past for alcohol withdrawal without hx of seizures/ICU admission/intubation/DTs. Acutely intoxicated with a level of 296        Inpatient treatment course: librium taper beginning at 25mg q8hrs; ativan 2mg prn for CIWA>8, not required; supplementation with folic acid, MV, thiamine        New medications: folic acid, MV, thiamine        Labs to be followed outpatient: none        Exam to be followed outpatient: none Patient is 42 yo F with past medical history of HTN, alcohol abuse & withdrawal        Presented with palpitations after her last drink, admitted for management of alcohol abuse and concern for withdrawal        Problem List/Main Diagnoses (system-based):     - Acute alcohol intoxication with concern for withdrawal: patient with palpitations, long-standing hx of alcohol abuse (1/2 liter of run daily x 8 years), multiple admissions in the past for alcohol withdrawal without hx of seizures/ICU admission/intubation/DTs. Acutely intoxicated with a level of 296 on admission. Started on librium taper beginning at 25mg q8hrs; ativan 2mg prn for CIWA>8, though was not required; supplementation with folic acid, MV, thiamine initiated. On the morning of 1/16, patient reported feeling significantly improved with VSS. Stated she had to leave to make an important appointment though was told that it has only been less than 24 hours since her last drink and she was still at high risk for withdrawal. Pt with capacity, aware that leaving could lead to worsening symptoms of withdrawal, seizures, and even death. Patient aware of risks but signed out AMA. Will call PCP Dr. Pizano to check in.

## 2020-01-16 NOTE — SBIRT NOTE ADULT - NSSBIRTBRIEFINTDET_GEN_A_CORE
Patient reports drinking ½ a liter of rum daily stating her last drink was 2 shots of rum on 1/15/2020 at 6am. Patient is currently in outpatient treatment at [facility] 3 days a week. SW offered patient list of inpatient services, patient declined.

## 2020-01-16 NOTE — DISCHARGE NOTE PROVIDER - NSDCMRMEDTOKEN_GEN_ALL_CORE_FT
Allegra 24 Hour Allergy oral tablet: 1 tab(s) orally once a day, As Needed  amLODIPine 2.5 mg oral tablet: 1 tab(s) orally once a day  Benadryl 50 mg oral capsule: 1 cap(s) orally once, As Needed  Yolanda 0.35 mg oral tablet: 1 tab(s) orally once a day

## 2020-01-21 DIAGNOSIS — Z71.89 OTHER SPECIFIED COUNSELING: ICD-10-CM

## 2020-01-21 DIAGNOSIS — F10.239 ALCOHOL DEPENDENCE WITH WITHDRAWAL, UNSPECIFIED: ICD-10-CM

## 2020-01-21 DIAGNOSIS — Z88.8 ALLERGY STATUS TO OTHER DRUGS, MEDICAMENTS AND BIOLOGICAL SUBSTANCES: ICD-10-CM

## 2020-01-21 DIAGNOSIS — I10 ESSENTIAL (PRIMARY) HYPERTENSION: ICD-10-CM

## 2020-01-21 DIAGNOSIS — Z79.899 OTHER LONG TERM (CURRENT) DRUG THERAPY: ICD-10-CM

## 2020-01-21 DIAGNOSIS — Z76.89 PERSONS ENCOUNTERING HEALTH SERVICES IN OTHER SPECIFIED CIRCUMSTANCES: ICD-10-CM

## 2020-02-01 ENCOUNTER — OUTPATIENT (OUTPATIENT)
Dept: OUTPATIENT SERVICES | Facility: HOSPITAL | Age: 45
LOS: 1 days | End: 2020-02-01
Payer: MEDICAID

## 2020-02-01 DIAGNOSIS — Z98.890 OTHER SPECIFIED POSTPROCEDURAL STATES: Chronic | ICD-10-CM

## 2020-02-01 PROCEDURE — H0002: CPT

## 2020-02-18 ENCOUNTER — INPATIENT (INPATIENT)
Facility: HOSPITAL | Age: 45
LOS: 2 days | Discharge: HOME | DRG: 897 | End: 2020-02-21
Attending: INTERNAL MEDICINE | Admitting: STUDENT IN AN ORGANIZED HEALTH CARE EDUCATION/TRAINING PROGRAM
Payer: MEDICAID

## 2020-02-18 VITALS
RESPIRATION RATE: 20 BRPM | TEMPERATURE: 98 F | DIASTOLIC BLOOD PRESSURE: 98 MMHG | SYSTOLIC BLOOD PRESSURE: 150 MMHG | WEIGHT: 143.08 LBS | HEART RATE: 141 BPM | OXYGEN SATURATION: 97 %

## 2020-02-18 DIAGNOSIS — Z91.89 OTHER SPECIFIED PERSONAL RISK FACTORS, NOT ELSEWHERE CLASSIFIED: ICD-10-CM

## 2020-02-18 DIAGNOSIS — F10.10 ALCOHOL ABUSE, UNCOMPLICATED: ICD-10-CM

## 2020-02-18 DIAGNOSIS — R63.8 OTHER SYMPTOMS AND SIGNS CONCERNING FOOD AND FLUID INTAKE: ICD-10-CM

## 2020-02-18 DIAGNOSIS — Z98.890 OTHER SPECIFIED POSTPROCEDURAL STATES: Chronic | ICD-10-CM

## 2020-02-18 DIAGNOSIS — I10 ESSENTIAL (PRIMARY) HYPERTENSION: ICD-10-CM

## 2020-02-18 DIAGNOSIS — F10.230 ALCOHOL DEPENDENCE WITH WITHDRAWAL, UNCOMPLICATED: ICD-10-CM

## 2020-02-18 LAB
ALBUMIN SERPL ELPH-MCNC: 4.4 G/DL — SIGNIFICANT CHANGE UP (ref 3.3–5)
ALP SERPL-CCNC: 65 U/L — SIGNIFICANT CHANGE UP (ref 40–120)
ALT FLD-CCNC: 58 U/L — HIGH (ref 10–45)
ANION GAP SERPL CALC-SCNC: 21 MMOL/L — HIGH (ref 5–17)
APPEARANCE UR: ABNORMAL
AST SERPL-CCNC: 68 U/L — HIGH (ref 10–40)
BILIRUB SERPL-MCNC: 0.8 MG/DL — SIGNIFICANT CHANGE UP (ref 0.2–1.2)
BILIRUB UR-MCNC: NEGATIVE — SIGNIFICANT CHANGE UP
BUN SERPL-MCNC: 7 MG/DL — SIGNIFICANT CHANGE UP (ref 7–23)
CALCIUM SERPL-MCNC: 9.1 MG/DL — SIGNIFICANT CHANGE UP (ref 8.4–10.5)
CHLORIDE SERPL-SCNC: 98 MMOL/L — SIGNIFICANT CHANGE UP (ref 96–108)
CO2 SERPL-SCNC: 23 MMOL/L — SIGNIFICANT CHANGE UP (ref 22–31)
COLOR SPEC: YELLOW — SIGNIFICANT CHANGE UP
CREAT SERPL-MCNC: 0.5 MG/DL — SIGNIFICANT CHANGE UP (ref 0.5–1.3)
DIFF PNL FLD: ABNORMAL
ETHANOL SERPL-MCNC: 392 MG/DL — HIGH (ref 0–10)
GLUCOSE SERPL-MCNC: 133 MG/DL — HIGH (ref 70–99)
GLUCOSE UR QL: NEGATIVE — SIGNIFICANT CHANGE UP
HCG UR QL: NEGATIVE — SIGNIFICANT CHANGE UP
HCT VFR BLD CALC: 49.4 % — HIGH (ref 34.5–45)
HGB BLD-MCNC: 16.9 G/DL — HIGH (ref 11.5–15.5)
KETONES UR-MCNC: ABNORMAL MG/DL
LEUKOCYTE ESTERASE UR-ACNC: NEGATIVE — SIGNIFICANT CHANGE UP
LIDOCAIN IGE QN: 53 U/L — SIGNIFICANT CHANGE UP (ref 7–60)
MCHC RBC-ENTMCNC: 32.1 PG — SIGNIFICANT CHANGE UP (ref 27–34)
MCHC RBC-ENTMCNC: 34.2 GM/DL — SIGNIFICANT CHANGE UP (ref 32–36)
MCV RBC AUTO: 93.9 FL — SIGNIFICANT CHANGE UP (ref 80–100)
NITRITE UR-MCNC: NEGATIVE — SIGNIFICANT CHANGE UP
NRBC # BLD: 0 /100 WBCS — SIGNIFICANT CHANGE UP (ref 0–0)
PH UR: 6 — SIGNIFICANT CHANGE UP (ref 5–8)
PLATELET # BLD AUTO: 204 K/UL — SIGNIFICANT CHANGE UP (ref 150–400)
POTASSIUM SERPL-MCNC: 4.7 MMOL/L — SIGNIFICANT CHANGE UP (ref 3.5–5.3)
POTASSIUM SERPL-SCNC: 4.7 MMOL/L — SIGNIFICANT CHANGE UP (ref 3.5–5.3)
PROT SERPL-MCNC: 6.8 G/DL — SIGNIFICANT CHANGE UP (ref 6–8.3)
PROT UR-MCNC: ABNORMAL MG/DL
RBC # BLD: 5.26 M/UL — HIGH (ref 3.8–5.2)
RBC # FLD: 12.4 % — SIGNIFICANT CHANGE UP (ref 10.3–14.5)
SODIUM SERPL-SCNC: 142 MMOL/L — SIGNIFICANT CHANGE UP (ref 135–145)
SP GR SPEC: 1.02 — SIGNIFICANT CHANGE UP (ref 1–1.03)
UROBILINOGEN FLD QL: 0.2 E.U./DL — SIGNIFICANT CHANGE UP
WBC # BLD: 9.32 K/UL — SIGNIFICANT CHANGE UP (ref 3.8–10.5)
WBC # FLD AUTO: 9.32 K/UL — SIGNIFICANT CHANGE UP (ref 3.8–10.5)

## 2020-02-18 PROCEDURE — 99285 EMERGENCY DEPT VISIT HI MDM: CPT

## 2020-02-18 RX ORDER — SODIUM CHLORIDE 9 MG/ML
1000 INJECTION, SOLUTION INTRAVENOUS ONCE
Refills: 0 | Status: COMPLETED | OUTPATIENT
Start: 2020-02-18 | End: 2020-02-18

## 2020-02-18 RX ORDER — AMLODIPINE BESYLATE 2.5 MG/1
2.5 TABLET ORAL DAILY
Refills: 0 | Status: DISCONTINUED | OUTPATIENT
Start: 2020-02-18 | End: 2020-02-21

## 2020-02-18 RX ORDER — INFLUENZA VIRUS VACCINE 15; 15; 15; 15 UG/.5ML; UG/.5ML; UG/.5ML; UG/.5ML
0.5 SUSPENSION INTRAMUSCULAR ONCE
Refills: 0 | Status: DISCONTINUED | OUTPATIENT
Start: 2020-02-18 | End: 2020-02-21

## 2020-02-18 RX ORDER — FOLIC ACID 0.8 MG
1 TABLET ORAL DAILY
Refills: 0 | Status: DISCONTINUED | OUTPATIENT
Start: 2020-02-18 | End: 2020-02-21

## 2020-02-18 RX ORDER — NORETHINDRONE 0.35 MG/1
1 TABLET ORAL
Qty: 0 | Refills: 0 | DISCHARGE

## 2020-02-18 RX ORDER — THIAMINE MONONITRATE (VIT B1) 100 MG
100 TABLET ORAL DAILY
Refills: 0 | Status: DISCONTINUED | OUTPATIENT
Start: 2020-02-18 | End: 2020-02-21

## 2020-02-18 RX ORDER — FEXOFENADINE HCL 30 MG
1 TABLET ORAL
Qty: 0 | Refills: 0 | DISCHARGE

## 2020-02-18 RX ORDER — SODIUM CHLORIDE 9 MG/ML
1000 INJECTION INTRAMUSCULAR; INTRAVENOUS; SUBCUTANEOUS ONCE
Refills: 0 | Status: COMPLETED | OUTPATIENT
Start: 2020-02-18 | End: 2020-02-18

## 2020-02-18 RX ORDER — DIPHENHYDRAMINE HCL 50 MG
1 CAPSULE ORAL
Qty: 0 | Refills: 0 | DISCHARGE

## 2020-02-18 RX ADMIN — Medication 1 MILLIGRAM(S): at 23:28

## 2020-02-18 RX ADMIN — SODIUM CHLORIDE 1000 MILLILITER(S): 9 INJECTION, SOLUTION INTRAVENOUS at 18:29

## 2020-02-18 RX ADMIN — SODIUM CHLORIDE 1000 MILLILITER(S): 9 INJECTION INTRAMUSCULAR; INTRAVENOUS; SUBCUTANEOUS at 17:10

## 2020-02-18 RX ADMIN — Medication 2 MILLIGRAM(S): at 16:06

## 2020-02-18 RX ADMIN — Medication 50 MILLIGRAM(S): at 19:04

## 2020-02-18 RX ADMIN — SODIUM CHLORIDE 1000 MILLILITER(S): 9 INJECTION INTRAMUSCULAR; INTRAVENOUS; SUBCUTANEOUS at 18:29

## 2020-02-18 RX ADMIN — SODIUM CHLORIDE 1000 MILLILITER(S): 9 INJECTION INTRAMUSCULAR; INTRAVENOUS; SUBCUTANEOUS at 16:06

## 2020-02-18 RX ADMIN — Medication 2 MILLIGRAM(S): at 17:55

## 2020-02-18 NOTE — H&P ADULT - PROBLEM SELECTOR PLAN 2
History of HTN, on Amlodipine 2.5 mg daily, normotensive on admission  - Hold amlodipine 2.5 mg daily for now History of HTN, on Amlodipine 2.5 mg daily, normotensive on admission  - C/w amlodipine 2.5 mg daily

## 2020-02-18 NOTE — ED ADULT NURSE NOTE - OBJECTIVE STATEMENT
Pt presents to ED complaining of palpitations. Pt states "I have been trying to wean myself off of alcohol for a year, it has been very hard. This weekend I had 24 bottles of wine. I tried to drink less the last few days. I have been having palpitations and feeling very anxious". Pt reports last drink was this morning- had 1 bottle of wine. Denies hx of seizures r/t ETOH withdrawal. Pt noted to be tachycardic, mildly tremulous. Upgraded to Dr. Walker, at bedside. Placed on continuous cardiac/pulse oximetry monitoring. A&ox4.

## 2020-02-18 NOTE — ED ADULT TRIAGE NOTE - OTHER COMPLAINTS
pt reports palpitations and chest pressure x 3 days.  pt drinks alcohol daily, last drink was a few hours ago.

## 2020-02-18 NOTE — H&P ADULT - PROBLEM SELECTOR PLAN 3
Multiple admissions for EtOH withdrawal in the past, no prior history of withdrawal seizures, DTs. Pt currently attends outpt rehab 3 days/week  -Psych consult in AM

## 2020-02-18 NOTE — H&P ADULT - HISTORY OF PRESENT ILLNESS
43F pmh htn, etoh abuse, multiple prior admits for withdrawal p/w palpitations and fatigue after attempting self-taper since January when last admitted for withdrawal. States normally drinks ~25bottles of wine per week, yesterday and today only had 1. Has been cutting back for past week and noted palpitations and fatigue since Sunday, worsening. Similar to prior episodes. No itching, no avh, no seizures, intubations/dt.    In the ED:  Initial vital signs: T: 98.6 F, HR: 141, BP: 150/98, R: 20, SpO2: 97% on RA  Labs: significant for Hb 16.9, AG 21, alcohol level 392  Imaging:   CXR:   EKG: sinus tachycardia   Medications: 3L NS bolus   Consults: none Pt is a 43yo F pmhx HTN, etoh abuse, multiple prior admits for withdrawal p/w palpitations and fatigue after attempting self-taper since January when last admitted for withdrawal. States she normally drinks ~25bottles of wine per week but has been cutting back for past week. Today she cut back to one large bottle of wine, last drink 6AM this morning. Shortly after sge started to experience palpitations, one episode of emesis at 8AM with none since. She reports this is similar to prior episodes. No prior history of seizures, DTs, or intubations. Feels 2/10 chest pressure associated with palpitations. With mild anxiety, mild sweating. Denies auditory/visual/tactile disturbances. Denies any other acute symptoms. Denies tremor or disorientation. Follows with PCP Harinder. Attends outpt etoh rehab 3x/week.    In the ED:  Initial vital signs: T: 98.6 F, HR: 141, BP: 150/98, R: 20, SpO2: 97% on RA  Labs: significant for Hb 16.9, AG 21, alcohol level 392  EKG: sinus tachycardia   Medications: 2L NS+1L LR bolus, Ativan 2mg IVP x2  Consults: None Pt is a 43yo F pmhx HTN, etoh abuse, multiple prior admits for withdrawal p/w palpitations and one episode emesis after attempting self-taper, which she has been attempting since early January (last admitted for withdrawal 01/15). States she normally drinks ~25bottles of wine per week but has been trying to cut back. Today she cut back to one large bottle of wine, last drink 6AM this morning. Shortly after sge started to experience palpitations, one episode of emesis at 8AM with none since. She reports this is similar to prior episodes. No prior history of seizures, DTs, or intubations. Feels 2/10 chest pressure associated with palpitations. With mild anxiety, mild sweating. Denies auditory/visual/tactile disturbances. Denies any other acute symptoms. Denies tremor or disorientation. Follows with PCP Haridner. Attends outpt etoh rehab 3x/week.    In the ED:  Initial vital signs: T: 98.6 F, HR: 141, BP: 150/98, R: 20, SpO2: 97% on RA  Labs: significant for Hb 16.9, AG 21, alcohol level 392  EKG: sinus tachycardia   Medications: 2L NS+1L LR bolus, Ativan 2mg IVP x2  Consults: None 44 year old female with HTN, alcohol abuse, multiple prior admits for withdrawal p/w palpitations and one episode emesis after attempting self-taper, which she has been attempting since early January (last admitted for withdrawal 01/15). States she normally drinks ~25bottles of wine per week but has been trying to cut back. Today she cut back to one large bottle of wine, last drink 6AM this morning. Shortly after sge started to experience palpitations, one episode of emesis at 8AM with none since. She reports this is similar to prior episodes. Denies prior history of seizures, DTs, or intubations. Feels 2/10 chest pressure associated with palpitations, but improved in ED. With mild anxiety, mild sweating. Denies auditory/visual/tactile disturbances. Denies any other acute symptoms. Denies tremor or disorientation. Follows with PCP Harinder. Reports that she attends outpt alcohol rehab 3x/week.    In the ED:  Initial vital signs: T: 98.6 F, HR: 141, BP: 150/98, R: 20, SpO2: 97% on RA  Labs: significant for Hb 16.9, AG 21, alcohol level 392; Mg 2.1, Phos 3.3  EKG: sinus tachycardia to 139  Medications: 2L NS+1L LR bolus, Ativan 2mg IVP x2

## 2020-02-18 NOTE — H&P ADULT - NSHPLABSRESULTS_GEN_ALL_CORE
( @ 16:13)                      16.9  9.32 )-----------( 204                 49.4    Neutrophils = -- (--%)  Lymphocytes = -- (--%)  Eosinophils = -- (--%)  Basophils = -- (--%)  Monocytes = -- (--%)  Bands = --%        142  |  98  |  7   ----------------------------<  133<H>  4.7   |  23  |  0.50    Ca    9.1      2020 16:13    TPro  6.8  /  Alb  4.4  /  TBili  0.8  /  DBili  x   /  AST  68<H>  /  ALT  58<H>  /  AlkPhos  65            RVP:          Tox:         Urinalysis Basic - ( 2020 17:53 )    Color: Yellow / Appearance: SL Cloudy / S.025 / pH: x  Gluc: x / Ketone: Trace mg/dL  / Bili: Negative / Urobili: 0.2 E.U./dL   Blood: x / Protein: Trace mg/dL / Nitrite: NEGATIVE   Leuk Esterase: NEGATIVE / RBC: < 5 /HPF / WBC < 5 /HPF   Sq Epi: x / Non Sq Epi: 0-5 /HPF / Bacteria: Present /HPF

## 2020-02-18 NOTE — H&P ADULT - PROBLEM SELECTOR PLAN 5
Transition of care performed with sharing of clinical summary. Plan: 1) PCP Contacted on Admission: (Y) --> Name & Phone #: Dr. Pizano  2) Date of Contact with PCP: 02/18  3) PCP Contacted at Discharge: (Y/N, N/A)  4) Summary of Handoff Given to PCP:   5) Post-Discharge Appointment Date and Location:

## 2020-02-18 NOTE — ED PROVIDER NOTE - OBJECTIVE STATEMENT
43F pmh htn, etoh abuse, multiple prior admits for withdrawal p/w palpitations and fatigue after attempting self-taper since January when last admitted for withdrawal. States normally drinks ~25bottles of wine per week, yesterday and today only had 1. Has been cutting back for past week and noted palpitations and fatigue since Sunday, worsening. Similar to prior episodes. No itching, no avh, no seizures, intubations/dt.

## 2020-02-18 NOTE — ED PROVIDER NOTE - PHYSICAL EXAMINATION
VITAL SIGNS: I have reviewed nursing notes and confirm.  CONSTITUTIONAL: Well-developed; well-nourished; in mild distress.  SKIN: Skin is warm and dry, no acute rash.  HEAD: Normocephalic; atraumatic.  EYES:  EOM intact; conjunctiva and sclera clear.  ENT: No nasal discharge; airway clear.  NECK: Supple; Voluntary FROM  CARD: No rubs appreciated, tachycardic rate and rhythm.  RESP: No wheezes, no rales. No respiratory distress  ABD: Soft; non-distended; non-tender; no rebound or guarding  EXT: Normal ROM. No cyanosis or edema.  NEURO: Alert, oriented. mildly tremulous w/ tremulous voice, Grossly unremarkable.  PSYCH: Cooperative, appropriate.

## 2020-02-18 NOTE — H&P ADULT - PROBLEM SELECTOR PLAN 1
Patient presenting with palpitations, long-standing history of EtOH abuse (for past 8 years), multiple admissions for EtOH withdrawal in the past, no seizures/ICU/intubations/DTs; EtOH level on admission 392; last drink: 6 am on 2/18; s/p Ativan 2 mg IVP x2 and 3 L NS in ED, CIWA 3 for mild anxiety, agitation, sweating.  - Librium 50 mg q8h, taper as tolerated  - Ativan 2 mg IVP PRN for CIWA >8  - CIWA Protocol, monitor q2-4hrs  - Folic Acid/MVI/Thiamine Patient presenting with palpitations, long-standing history of EtOH abuse (for past 8 years), multiple admissions for EtOH withdrawal in the past, no seizures/ICU/intubations/DTs; EtOH level on admission 392; last drink: 6 am on 2/18/2020; s/p Ativan 2 mg IVP x2 and 3 L NS in ED, CIWA 3 in ED for mild anxiety, agitation, sweating.  - Librium 50 mg q8h, taper as tolerated  - Ativan 2 mg IVP PRN for CIWA >8  - CIWA Protocol, monitor q2-4hrs  - Folic Acid/MVI/Thiamine  - Monitor electrolytes and replace PRN

## 2020-02-18 NOTE — H&P ADULT - ASSESSMENT
44F PMH of EtOH abuse, HTN presents with palpitations/anxiety after her last drink at 6AM this morning, admitted for management of ETOH withdrawal. 44F with EtOH abuse, HTN presents with palpitations/anxiety after her last alcoholic drink at 6AM this morning, admitted for management of ETOH withdrawal.

## 2020-02-18 NOTE — ED PROVIDER NOTE - CLINICAL SUMMARY MEDICAL DECISION MAKING FREE TEXT BOX
43F pmh htn, etoh abuse, multiple prior admits for withdrawal p/w palpitations and fatigue after attempting self-taper since January when last admitted for withdrawal. Palpitations and fatigue since Sunday, worsening. Similar to prior episodes. No itching, no avh, no seizures, intubations/dt. exam noted for tremulous and tachycardic. Concern alcohol withdrawal, less likely DT, dehydration, electrolyte abnormality. HR improved to 120's after 2L. H/H likely hemoconcentrated due to dehydration. Add'l IVF rx'd and att'l ativan. No severe htn or sever tremulous. anticipate admission. 43F pmh htn, etoh abuse, multiple prior admits for withdrawal p/w palpitations and fatigue after attempting self-taper since January when last admitted for withdrawal. Palpitations and fatigue since Sunday, worsening. Similar to prior episodes. No itching, no avh, no seizures, intubations/dt. exam noted for tremulous and tachycardic. Concern alcohol withdrawal, less likely DT, dehydration, electrolyte abnormality. HR improved to 120's after 2L. H/H likely hemoconcentrated due to dehydration. Add'l IVF rx'd and att'l ativan. No severe htn or sever tremulous. anticipate admission, discussed w/ dr. desouza, accepted regional

## 2020-02-18 NOTE — H&P ADULT - PROBLEM SELECTOR PLAN 4
F: s/p 3L IVF  E: Replete K<4, Mg<2  N: DASH/TLC. F: s/p 3L IVF  E: Replete K<4, Mg<2  N: DASH/TLC    Code: Full  DVT ppx: low risk, no pharmacologic ppx needed (Padua score 0)  GI ppx: not needed  Dispo: [facility]

## 2020-02-18 NOTE — H&P ADULT - NSHPPHYSICALEXAM_GEN_ALL_CORE
GENERAL: no acute distress; appears stated age  HEENT:  atraumatic, normocephalic, conjunctiva and sclera clear, oropharynx clear, moist mucous membranes   NECK: supple  CHEST: no gross deformities   LUNG: clear to auscultation bilaterally; no wheezing, rales, rhonchi, or stridor  HEART: regular rate and rhythm; S1 and S2 audible; no murmurs, rubs, or gallops  ABDOMEN: soft, nontender, nondistended; bowel sounds present in all four quadrants  EXTREMITIES:  2+ peripheral pulses bilaterally; no clubbing, cyanosis, or edema  NEUROLOGY: awake, alert, oriented x3; grossly intact with no focal deficits   SKIN: no rashes or lesions GENERAL: mildly uncomfortable/anxious; appears stated age; no obvious tremors  HEENT:  atraumatic, normocephalic, conjunctiva and sclera clear, oropharynx clear, moist mucous membranes, no tongue fasciculations  NECK: supple  CHEST: no gross deformities   LUNG: clear to auscultation bilaterally; no wheezing, rales, rhonchi, or stridor  HEART: +TACHYCARDIA, regular rhythm; S1 and S2 audible; no murmurs, rubs, or gallops  ABDOMEN: soft, nontender, nondistended; normoactive BS  EXTREMITIES: no clubbing, cyanosis, or edema, no tremors with both arms extended  NEUROLOGY: A&Ox3; grossly intact with no focal deficits,  SKIN: no rashes or lesions    CIWA: 3 (MILD Anxiety, Sweating, Agitation)

## 2020-02-19 LAB
ALBUMIN SERPL ELPH-MCNC: 3.5 G/DL — SIGNIFICANT CHANGE UP (ref 3.3–5)
ALP SERPL-CCNC: 44 U/L — SIGNIFICANT CHANGE UP (ref 40–120)
ALT FLD-CCNC: 38 U/L — SIGNIFICANT CHANGE UP (ref 10–45)
ANION GAP SERPL CALC-SCNC: 8 MMOL/L — SIGNIFICANT CHANGE UP (ref 5–17)
AST SERPL-CCNC: 45 U/L — HIGH (ref 10–40)
BILIRUB SERPL-MCNC: 1.2 MG/DL — SIGNIFICANT CHANGE UP (ref 0.2–1.2)
BUN SERPL-MCNC: 9 MG/DL — SIGNIFICANT CHANGE UP (ref 7–23)
CALCIUM SERPL-MCNC: 8.2 MG/DL — LOW (ref 8.4–10.5)
CHLORIDE SERPL-SCNC: 101 MMOL/L — SIGNIFICANT CHANGE UP (ref 96–108)
CO2 SERPL-SCNC: 27 MMOL/L — SIGNIFICANT CHANGE UP (ref 22–31)
CREAT SERPL-MCNC: 0.53 MG/DL — SIGNIFICANT CHANGE UP (ref 0.5–1.3)
GLUCOSE SERPL-MCNC: 101 MG/DL — HIGH (ref 70–99)
HCT VFR BLD CALC: 36.3 % — SIGNIFICANT CHANGE UP (ref 34.5–45)
HGB BLD-MCNC: 12.5 G/DL — SIGNIFICANT CHANGE UP (ref 11.5–15.5)
MAGNESIUM SERPL-MCNC: 1.8 MG/DL — SIGNIFICANT CHANGE UP (ref 1.6–2.6)
MCHC RBC-ENTMCNC: 32.5 PG — SIGNIFICANT CHANGE UP (ref 27–34)
MCHC RBC-ENTMCNC: 34.4 GM/DL — SIGNIFICANT CHANGE UP (ref 32–36)
MCV RBC AUTO: 94.3 FL — SIGNIFICANT CHANGE UP (ref 80–100)
NRBC # BLD: 0 /100 WBCS — SIGNIFICANT CHANGE UP (ref 0–0)
PHOSPHATE SERPL-MCNC: 2.9 MG/DL — SIGNIFICANT CHANGE UP (ref 2.5–4.5)
PLATELET # BLD AUTO: 138 K/UL — LOW (ref 150–400)
POTASSIUM SERPL-MCNC: 4.5 MMOL/L — SIGNIFICANT CHANGE UP (ref 3.5–5.3)
POTASSIUM SERPL-SCNC: 4.5 MMOL/L — SIGNIFICANT CHANGE UP (ref 3.5–5.3)
PROT SERPL-MCNC: 5.5 G/DL — LOW (ref 6–8.3)
RBC # BLD: 3.85 M/UL — SIGNIFICANT CHANGE UP (ref 3.8–5.2)
RBC # FLD: 12.6 % — SIGNIFICANT CHANGE UP (ref 10.3–14.5)
SODIUM SERPL-SCNC: 136 MMOL/L — SIGNIFICANT CHANGE UP (ref 135–145)
WBC # BLD: 10.06 K/UL — SIGNIFICANT CHANGE UP (ref 3.8–10.5)
WBC # FLD AUTO: 10.06 K/UL — SIGNIFICANT CHANGE UP (ref 3.8–10.5)

## 2020-02-19 PROCEDURE — 93306 TTE W/DOPPLER COMPLETE: CPT | Mod: 26

## 2020-02-19 PROCEDURE — 90792 PSYCH DIAG EVAL W/MED SRVCS: CPT

## 2020-02-19 RX ORDER — LANOLIN ALCOHOL/MO/W.PET/CERES
3 CREAM (GRAM) TOPICAL AT BEDTIME
Refills: 0 | Status: DISCONTINUED | OUTPATIENT
Start: 2020-02-19 | End: 2020-02-21

## 2020-02-19 RX ORDER — LANOLIN ALCOHOL/MO/W.PET/CERES
3 CREAM (GRAM) TOPICAL ONCE
Refills: 0 | Status: COMPLETED | OUTPATIENT
Start: 2020-02-19 | End: 2020-02-19

## 2020-02-19 RX ORDER — MAGNESIUM SULFATE 500 MG/ML
2 VIAL (ML) INJECTION ONCE
Refills: 0 | Status: COMPLETED | OUTPATIENT
Start: 2020-02-19 | End: 2020-02-19

## 2020-02-19 RX ORDER — MIRTAZAPINE 45 MG/1
15 TABLET, ORALLY DISINTEGRATING ORAL AT BEDTIME
Refills: 0 | Status: DISCONTINUED | OUTPATIENT
Start: 2020-02-19 | End: 2020-02-21

## 2020-02-19 RX ADMIN — Medication 100 MILLIGRAM(S): at 12:19

## 2020-02-19 RX ADMIN — AMLODIPINE BESYLATE 2.5 MILLIGRAM(S): 2.5 TABLET ORAL at 06:10

## 2020-02-19 RX ADMIN — Medication 25 MILLIGRAM(S): at 19:49

## 2020-02-19 RX ADMIN — Medication 50 MILLIGRAM(S): at 03:07

## 2020-02-19 RX ADMIN — Medication 1 MILLIGRAM(S): at 01:56

## 2020-02-19 RX ADMIN — Medication 50 GRAM(S): at 17:50

## 2020-02-19 RX ADMIN — Medication 50 MILLIGRAM(S): at 12:19

## 2020-02-19 RX ADMIN — MIRTAZAPINE 15 MILLIGRAM(S): 45 TABLET, ORALLY DISINTEGRATING ORAL at 21:23

## 2020-02-19 RX ADMIN — Medication 3 MILLIGRAM(S): at 01:56

## 2020-02-19 RX ADMIN — Medication 1 MILLIGRAM(S): at 12:19

## 2020-02-19 RX ADMIN — Medication 3 MILLIGRAM(S): at 21:23

## 2020-02-19 RX ADMIN — Medication 1 TABLET(S): at 12:19

## 2020-02-19 NOTE — BEHAVIORAL HEALTH ASSESSMENT NOTE - RISK ASSESSMENT
Low Acute Suicide Risk Denies h/o suicidal ideation, no h/o SA, has boyfriend, attends groups/outpatient program and likes this, has therapist with good relationship but continues to abuse alcohol which likely has contributed to loss of work and increased isolation.

## 2020-02-19 NOTE — BEHAVIORAL HEALTH ASSESSMENT NOTE - SUMMARY
45 yo female with alcohol abuse for last 8 years up to point of drinking 20 bottles wine/week readmitted for etoh withdrawal.  Already in outpatient treatment at [facility] and talks about regular attendance and compliance with Naltrexone but inc use secondary to worsening insomnia and mood symptoms related to current state of life.   Wishes to get into residential program but had difficulty finding one.

## 2020-02-19 NOTE — BEHAVIORAL HEALTH ASSESSMENT NOTE - VIOLENCE PROTECTIVE FACTORS:
Engagement in treatment/Insight into violence risk and need for management/treatment/Residential stability

## 2020-02-19 NOTE — BEHAVIORAL HEALTH ASSESSMENT NOTE - NSBHCONSULTMEDS_PSY_A_CORE FT
Can start trial of Remeron 15mg po at bedtime to help with sleep and possibly underlying mood symptoms.   If patient tolerates this Rx, she can continue to take it after discharge with f/u at [facility] or residential sub abuse program.     Alcohol detox with Librium and vit supp already started.  Team monitoring for breakthrough withdrawal.

## 2020-02-19 NOTE — BEHAVIORAL HEALTH ASSESSMENT NOTE - NSBHADMITCOUNSEL_PSY_A_CORE
risks and benefits of treatment options/instructions for management, treatment and follow up/risk factor reduction/diagnostic results/impressions and/or recommended studies/importance of adherence to chosen treatment

## 2020-02-19 NOTE — SBIRT NOTE ADULT - NSSBIRTBRIEFINTDET_GEN_A_CORE
Patient reported drinking 25 bottles of wine a week with her last drink being at 6am on 2/18/2020. SW offered patient resources, patient declined. Patient currently attends outpatient rehab at [facility] 3x week.

## 2020-02-19 NOTE — BEHAVIORAL HEALTH ASSESSMENT NOTE - NSBHMEDSOTHERFT_PSY_A_CORE
Naltrexone 50mg daily, antihypertensive "lowest dose" (feels HTN may be due to alcohol and withdrawal)

## 2020-02-19 NOTE — PROGRESS NOTE ADULT - SUBJECTIVE AND OBJECTIVE BOX
OVERNIGHT EVENTS: received ativan 1mg IV x2     SUBJECTIVE / INTERVAL HPI: Patient seen and examined at bedside. Says she feels anxious and feels her heart rate being fast. Denies headache, diaphoresis, visual or auditory disturbances, nausea or vomiting, abdominal pain. CIWA=3 (anxious + visible hand tremor)     VITAL SIGNS:  Vital Signs Last 24 Hrs  T(C): 36.3 (2020 08:46), Max: 37.1 (2020 17:59)  T(F): 97.4 (2020 08:46), Max: 98.8 (2020 17:59)  HR: 115 (2020 08:46) (110 - 137)  BP: 135/83 (2020 08:46) (110/70 - 149/76)  BP(mean): --  RR: 17 (2020 08:46) (17 - 22)  SpO2: 97% (2020 08:46) (95% - 99%)    PHYSICAL EXAM:    General: NAD, breathing comfortably on room air, anxious, non-diaphoretic, postural hand tremor  HEENT: NC/AT; PERRL, anicteric sclera; MMM, no tongue tremor  Neck: supple  Cardiovascular: +S1/S2; tachycardic  Respiratory: CTA B/L; no W/R/R  Gastrointestinal: soft, NT/ND; +BSx4  Extremities: WWP; no edema, clubbing or cyanosis  Vascular: 2+ radial, DP/PT pulses B/L  Neurological: AAOx3; no focal deficits    MEDICATIONS:  MEDICATIONS  (STANDING):  amLODIPine   Tablet 2.5 milliGRAM(s) Oral daily  chlordiazePOXIDE 25 milliGRAM(s) Oral every 8 hours  folic acid 1 milliGRAM(s) Oral daily  influenza   Vaccine 0.5 milliLiter(s) IntraMuscular once  mirtazapine 15 milliGRAM(s) Oral at bedtime  multivitamin 1 Tablet(s) Oral daily  thiamine 100 milliGRAM(s) Oral daily    MEDICATIONS  (PRN):  LORazepam   Injectable 2 milliGRAM(s) IV Push every 6 hours PRN CIWA > 8      ALLERGIES:  Allergies    predniSONE (Rash)  Tamiflu (Other)    Intolerances        LABS:                        12.5   10.06 )-----------( 138      ( 2020 07:19 )             36.3         136  |  101  |  9   ----------------------------<  101<H>  4.5   |  27  |  0.53    Ca    8.2<L>      2020 07:19  Phos  2.9       Mg     1.8         TPro  5.5<L>  /  Alb  3.5  /  TBili  1.2  /  DBili  x   /  AST  45<H>  /  ALT  38  /  AlkPhos  44        Urinalysis Basic - ( 2020 17:53 )    Color: Yellow / Appearance: SL Cloudy / S.025 / pH: x  Gluc: x / Ketone: Trace mg/dL  / Bili: Negative / Urobili: 0.2 E.U./dL   Blood: x / Protein: Trace mg/dL / Nitrite: NEGATIVE   Leuk Esterase: NEGATIVE / RBC: < 5 /HPF / WBC < 5 /HPF   Sq Epi: x / Non Sq Epi: 0-5 /HPF / Bacteria: Present /HPF      CAPILLARY BLOOD GLUCOSE          RADIOLOGY & ADDITIONAL TESTS: Reviewed.    ASSESSMENT:    PLAN:

## 2020-02-19 NOTE — BEHAVIORAL HEALTH ASSESSMENT NOTE - NSBHCONSULTFOLLOWAFTERCARE_PSY_A_CORE FT
Residential rehab/28d program if available,  coordinate with [facility] outpatient program.   Continue Remeron 15mg po qbed if tolerated while inhouse.

## 2020-02-19 NOTE — BEHAVIORAL HEALTH ASSESSMENT NOTE - HPI (INCLUDE ILLNESS QUALITY, SEVERITY, DURATION, TIMING, CONTEXT, MODIFYING FACTORS, ASSOCIATED SIGNS AND SYMPTOMS)
43 yo single, domiciled in [place], lives alone female with long history of alcohol dependance readmitted to [facility] for help with detox.  Patient was last here 1/15-16/2020 for same reason but had to leave to take care of personal issues.  Admits to drinking up to 20- bottles of wine in last 1.5 weeks.       Patient open to talking about history of alcohol "severe for last 8 yrs" and describing issues with insomnia and some sadness about state of life and future goals.  States she used to date a  who she would meet up with in the middle of the night (when he was getting off work) leading to irregular sleep.  States she would drink some to induce sleep.  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   Patient is future oriented saying she is here as she knows she needs to stop drinking and hopeful she can get back on her feet to begin getting ready to work again.   States she would like to get into a residential rehab program but has had difficulty finding a program that accepts her insurance.      Is currently in outpt sub abuse treatment at [facility] in [facility] in [place] and sees counselor 1x/week and informally 2 more times. She also attends a woman's group there.  Is prescribed Naltrexone 50mg daily which she thinks has been helpful to decrease amount of etoh and delay first drink until recently.   States she has not tried many prescription meds but that she did try something at [facility] last August for anxiety which made "me cry for 4 days after just 1 day of taking the med" (chart review shows she was tried on Gabapentin 100mg BID at that time).     Patient denies clear history of depressed mood, kaley, suicidality or psychosis.  As previously mentioned, during interview does get tearful when talking about being unemployed and when questioned agrees maybe she is "down" about the state of her life.

## 2020-02-19 NOTE — BEHAVIORAL HEALTH ASSESSMENT NOTE - PAST PSYCHOTROPIC MEDICATION
tried Gabapentin during previous admit with some inc in crying "even while doing laundry".  On Naltrexone. Has tried OTC sleep agents like melatonin, valerian root for sleep.  Was Rx a hypnotic in past and "took whole 30 day suppy in less than a week cause it wasn't working".   Denies other psychotropic med trials.

## 2020-02-19 NOTE — BEHAVIORAL HEALTH ASSESSMENT NOTE - SUICIDE PROTECTIVE FACTORS
Supportive social network of family or friends/Identifies reasons for living/Has future plans/Fear of death or the actual act of killing self/Positive therapeutic relationships

## 2020-02-20 ENCOUNTER — TRANSCRIPTION ENCOUNTER (OUTPATIENT)
Age: 45
End: 2020-02-20

## 2020-02-20 LAB
ANION GAP SERPL CALC-SCNC: 10 MMOL/L — SIGNIFICANT CHANGE UP (ref 5–17)
BUN SERPL-MCNC: 10 MG/DL — SIGNIFICANT CHANGE UP (ref 7–23)
CALCIUM SERPL-MCNC: 8.5 MG/DL — SIGNIFICANT CHANGE UP (ref 8.4–10.5)
CHLORIDE SERPL-SCNC: 103 MMOL/L — SIGNIFICANT CHANGE UP (ref 96–108)
CO2 SERPL-SCNC: 27 MMOL/L — SIGNIFICANT CHANGE UP (ref 22–31)
CREAT SERPL-MCNC: 0.55 MG/DL — SIGNIFICANT CHANGE UP (ref 0.5–1.3)
GLUCOSE SERPL-MCNC: 95 MG/DL — SIGNIFICANT CHANGE UP (ref 70–99)
HCT VFR BLD CALC: 37.9 % — SIGNIFICANT CHANGE UP (ref 34.5–45)
HGB BLD-MCNC: 12.8 G/DL — SIGNIFICANT CHANGE UP (ref 11.5–15.5)
MAGNESIUM SERPL-MCNC: 2.2 MG/DL — SIGNIFICANT CHANGE UP (ref 1.6–2.6)
MCHC RBC-ENTMCNC: 32.5 PG — SIGNIFICANT CHANGE UP (ref 27–34)
MCHC RBC-ENTMCNC: 33.8 GM/DL — SIGNIFICANT CHANGE UP (ref 32–36)
MCV RBC AUTO: 96.2 FL — SIGNIFICANT CHANGE UP (ref 80–100)
NRBC # BLD: 0 /100 WBCS — SIGNIFICANT CHANGE UP (ref 0–0)
PLATELET # BLD AUTO: 131 K/UL — LOW (ref 150–400)
POTASSIUM SERPL-MCNC: 4.2 MMOL/L — SIGNIFICANT CHANGE UP (ref 3.5–5.3)
POTASSIUM SERPL-SCNC: 4.2 MMOL/L — SIGNIFICANT CHANGE UP (ref 3.5–5.3)
RBC # BLD: 3.94 M/UL — SIGNIFICANT CHANGE UP (ref 3.8–5.2)
RBC # FLD: 12.3 % — SIGNIFICANT CHANGE UP (ref 10.3–14.5)
SODIUM SERPL-SCNC: 140 MMOL/L — SIGNIFICANT CHANGE UP (ref 135–145)
WBC # BLD: 5.14 K/UL — SIGNIFICANT CHANGE UP (ref 3.8–10.5)
WBC # FLD AUTO: 5.14 K/UL — SIGNIFICANT CHANGE UP (ref 3.8–10.5)

## 2020-02-20 RX ADMIN — Medication 25 MILLIGRAM(S): at 03:53

## 2020-02-20 RX ADMIN — Medication 1 TABLET(S): at 11:17

## 2020-02-20 RX ADMIN — Medication 3 MILLIGRAM(S): at 22:21

## 2020-02-20 RX ADMIN — Medication 100 MILLIGRAM(S): at 11:17

## 2020-02-20 RX ADMIN — Medication 25 MILLIGRAM(S): at 19:28

## 2020-02-20 RX ADMIN — MIRTAZAPINE 15 MILLIGRAM(S): 45 TABLET, ORALLY DISINTEGRATING ORAL at 22:21

## 2020-02-20 RX ADMIN — Medication 1 MILLIGRAM(S): at 11:17

## 2020-02-20 RX ADMIN — AMLODIPINE BESYLATE 2.5 MILLIGRAM(S): 2.5 TABLET ORAL at 06:37

## 2020-02-20 RX ADMIN — Medication 25 MILLIGRAM(S): at 11:17

## 2020-02-20 NOTE — DIETITIAN INITIAL EVALUATION ADULT. - ADD RECOMMEND
c/w MVI, thiamine, folic acid 2/2 etoh. Encourage adequate PO, discussed current diet order, discussed meal ordering protocol.

## 2020-02-20 NOTE — DIETITIAN INITIAL EVALUATION ADULT. - PROBLEM SELECTOR PLAN 4
F: s/p 3L IVF  E: Replete K<4, Mg<2  N: DASH/TLC    Code: Full  DVT ppx: low risk, no pharmacologic ppx needed (Padua score 0)  GI ppx: not needed  Dispo: [facility]

## 2020-02-20 NOTE — DISCHARGE NOTE PROVIDER - CARE PROVIDER_API CALL
Kaleb Pizano)  Medicine  [address] [place] [address]  Phone: [phone]  Fax: [phone]  Follow Up Time:

## 2020-02-20 NOTE — PROGRESS NOTE ADULT - PROBLEM SELECTOR PLAN 4
F: s/p 3L IVF  E: Replete K<4, Mg<2  N: DASH/TLC    Code: Full  DVT ppx: low risk, no pharmacologic ppx needed (Padua score 0)  GI ppx: not needed  Dispo: [facility]
F: none  E: Replete K<4, Mg<2  N: DASH/TLC    Code: Full  DVT ppx: low risk, no pharmacologic ppx needed (Padua score 0)  GI ppx: not needed  Dispo: [facility]

## 2020-02-20 NOTE — PROGRESS NOTE ADULT - PROBLEM SELECTOR PLAN 1
Patient presenting with palpitations, long-standing history of EtOH abuse (for past 8 years), multiple admissions for EtOH withdrawal in the past, no seizures/ICU/intubations/DTs; EtOH level on admission 392; last drink: 6 am on 2/18/2020; s/p Ativan 2 mg IVP x2 and 3 L NS in ED, CIWA 3 in ED for mild anxiety, agitation, sweating. Current CIWA=3 (anxious + visible hand tremor), tapered librium 25mg q8  - c/w Librium 25 mg q8h, taper as tolerated  - Ativan 2 mg IVP PRN for CIWA >8  - CIWA Protocol, monitor q2-4hrs  - Folic Acid/MVI/Thiamine  - Monitor electrolytes and replace PRN
p/w palpitations, long-standing history of EtOH abuse (for past 8 years), multiple admissions for EtOH withdrawal in the past, no seizures/ICU/intubations/DTs; EtOH level on admission 392; last drink: 6 am on 2/18/2020. Current CIWA=0  -tapered librium to 25mg q8  - c/w Librium 25 mg q8h, taper as tolerated  - Ativan 2 mg IVP PRN for CIWA >8  - CIWA Protocol, monitor q2-4hrs  - Folic Acid/MVI/Thiamine  - Monitor electrolytes and replace PRN

## 2020-02-20 NOTE — PROGRESS NOTE ADULT - SUBJECTIVE AND OBJECTIVE BOX
incomplete OVERNIGHT EVENTS: No ativan given    SUBJECTIVE / INTERVAL HPI: Patient seen and examined at bedside. Says she is feeling better. Would like to join [facility] on discharge. Slept very well on the new Remeron. Denies headache, diaphoresis, anxiety, nausea, vomiting, abdominal pain. CIWA=0       T(F): 97.5 (20 @ 09:12)  HR: 95 (20 @ 09:12)  BP: 132/80 (20 @ 09:12)  RR: 16 (20 @ 09:12)  SpO2: 99% (20 @ 09:12)    PHYSICAL EXAM:    General: NAD, breathing comfortably on room air, anxious, non-diaphoretic, no postural hand tremor  HEENT: NC/AT; PERRL, anicteric sclera; MMM, no tongue tremor  Neck: supple  Cardiovascular: +S1/S2; RRR  Respiratory: CTA B/L; no W/R/R  Gastrointestinal: soft, NT/ND; +BSx4  Extremities: WWP; no edema, clubbing or cyanosis  Vascular: 2+ radial, DP/PT pulses B/L  Neurological: AAOx3; no focal deficits       LABS:                         12.8   5.14  )-----------( 131      ( 2020 06:38 )             37.9     02-20    140  |  103  |  10  ----------------------------<  95  4.2   |  27  |  0.55    Ca    8.5      2020 06:38  Phos  2.9     -  Mg     2.2     -    TPro  5.5<L>  /  Alb  3.5  /  TBili  1.2  /  DBili  x   /  AST  45<H>  /  ALT  38  /  AlkPhos  44  -19      Urinalysis Basic - ( 2020 17:53 )    Color: Yellow / Appearance: SL Cloudy / S.025 / pH: x  Gluc: x / Ketone: Trace mg/dL  / Bili: Negative / Urobili: 0.2 E.U./dL   Blood: x / Protein: Trace mg/dL / Nitrite: NEGATIVE   Leuk Esterase: NEGATIVE / RBC: < 5 /HPF / WBC < 5 /HPF   Sq Epi: x / Non Sq Epi: 0-5 /HPF / Bacteria: Present /HPF                RADIOLOGY, EKG & ADDITIONAL TESTS: Reviewed.

## 2020-02-20 NOTE — PROGRESS NOTE ADULT - ASSESSMENT
44F with EtOH abuse, HTN presents with palpitations/anxiety after her last alcoholic drink at 6AM this morning, admitted for management of ETOH withdrawal. Current CIWA=3, tapered librium 25mg q8
44F with EtOH abuse, HTN presents with palpitations/anxiety after her last alcoholic drink at 6AM this morning, admitted for management of ETOH withdrawal. Current CIWA=3, tapered librium 25mg q8

## 2020-02-20 NOTE — DISCHARGE NOTE PROVIDER - HOSPITAL COURSE
44F PMH EtOH abuse (Multiple admissions for EtOH withdrawal, no seizures or DT’s, currently attends outpt rehab 3 days/week), HTN         p/w palpitations/anxiety after her last alcoholic drink at 6AM on 2/18/2020. Drinks around 25bottles of wine per week but has been trying to cut back        #Alcohol withdrawal: In ED ,150/98, alcohol level 392, EKG sinus tachycardia, CIWA 3 (mild anxiety, agitation, sweating). Started on Librium and tapered off to a CIWA=0. Given Folic Acid/MVI/Thiamine    #Alcohol abuse: Pt currently in outpt sub abuse treatment at [facility] in [place] in [place] and sees counselor 3x/week and is prescribed Naltrexone 50mg daily there. Consulted psychiatry. Pt described issues with insomnia and some sadness about state of life and future goals leading to her drinking. Denied hx of depressed mood, kaley, suicidality or psychosis. Started Remeron 15mg to help w/ sleep which pt benefited from     #HTN: c/w amlodipine 2.5mg        Labs to follow up: none    Exam to follow up: none    Follow up: PCP 44F PMH EtOH abuse (Multiple admissions for EtOH withdrawal, no seizures or DT’s, currently attends outpt rehab 3 days/week), HTN         p/w palpitations/anxiety after her last alcoholic drink at 6AM on 2/18/2020. Drinks around 25bottles of wine per week but has been trying to cut back        #Alcohol withdrawal: In ED ,150/98, alcohol level 392, EKG sinus tachycardia, CIWA 3 (mild anxiety, agitation, sweating). Started on Librium and tapered off to a CIWA=0. Given Folic Acid/MVI/Thiamine    #Alcohol abuse: Pt currently in outpt sub abuse treatment at [facility] in [address] in [place] and sees counselor 3x/week and is prescribed Naltrexone 50mg daily there. Consulted psychiatry. Pt described issues with insomnia and some sadness about state of life and future goals leading to her drinking. Denied hx of depressed mood, kaley, suicidality or psychosis. Started Remeron 15mg to help w/ sleep which pt benefited from     #HTN: c/w amlodipine 2.5mg        #Thrombocytopenia: Pt w/ . No signs or symptoms of bleeding.         New meds: 1 dose of librium 25mg sent to pharmacy, Remeron 15mg qhs    Labs to follow up: PLT    Exam to follow up: none    Follow up: PCP, Psych 44F PMH EtOH abuse (Multiple admissions for EtOH withdrawal, no seizures or DT’s, currently attends outpt rehab 3 days/week), HTN         p/w palpitations/anxiety after her last alcoholic drink at 6AM on 2/18/2020. Drinks around 25bottles of wine per week but has been trying to cut back        #Alcohol withdrawal: In ED ,150/98, alcohol level 392, EKG sinus tachycardia, CIWA 3 (mild anxiety, agitation, sweating). Started on Librium and tapered off to a CIWA=0. Given Folic Acid/MVI/Thiamine    #Alcohol abuse: Pt currently in outpt sub abuse treatment at [facility] in [address] in [place] and sees counselor 3x/week and is prescribed Naltrexone 50mg daily there. Consulted psychiatry. Pt described issues with insomnia and some sadness about state of life and future goals leading to her drinking. Denied hx of depressed mood, kaley, suicidality or psychosis. Started Remeron 15mg to help w/ sleep which pt benefited from. Pt discharged w/ CIWA=0    #HTN: c/w amlodipine 2.5mg        #Thrombocytopenia: Pt w/ . No signs or symptoms of bleeding.         New meds: 1 dose of librium 25mg sent to pharmacy, Remeron 15mg qhs    Labs to follow up: PLT    Exam to follow up: Heart rate    Follow up: PCP, Psych 44F PMH EtOH abuse (Multiple admissions for EtOH withdrawal, no seizures or DT’s, currently attends outpt rehab 3 days/week), HTN         p/w palpitations/anxiety after her last alcoholic drink at 6AM on 2/18/2020. Drinks around 25bottles of wine per week but has been trying to cut back        #Alcohol withdrawal: In ED ,150/98, alcohol level 392, EKG sinus tachycardia, CIWA 3 (mild anxiety, agitation, sweating). Started on Librium and tapered off to a CIWA=0. Given Folic Acid/MVI/Thiamine    #Alcohol abuse: Pt currently in outpt sub abuse treatment at [facility] in [address] in [place] and sees counselor 3x/week and is prescribed Naltrexone 50mg daily there. Consulted psychiatry. Pt described issues with insomnia and some sadness about state of life and future goals leading to her drinking. Denied hx of depressed mood, kaley, suicidality or psychosis. Started Remeron 15mg to help w/ sleep which pt benefited from. Pt discharged w/ CIWA=0    #HTN: c/w amlodipine 2.5mg        #Thrombocytopenia: Pt w/ . No signs or symptoms of bleeding.         New meds: Remeron 15mg qhs    Labs to follow up: PLT    Exam to follow up: Heart rate    Follow up: PCP, Psych

## 2020-02-20 NOTE — DISCHARGE NOTE PROVIDER - NSDCMRMEDTOKEN_GEN_ALL_CORE_FT
amLODIPine 2.5 mg oral tablet: 1 tab(s) orally once a day amLODIPine 2.5 mg oral tablet: 1 tab(s) orally once a day  chlordiazePOXIDE 25 mg oral capsule: 1 cap(s) orally every 12 hours  mirtazapine 15 mg oral tablet: 1 tab(s) orally once a day (at bedtime) amLODIPine 2.5 mg oral tablet: 1 tab(s) orally once a day  chlordiazePOXIDE 25 mg oral capsule: 1 cap(s) orally once MDD:1   folic acid 1 mg oral tablet: 1 tab(s) orally once a day  mirtazapine 15 mg oral tablet: 1 tab(s) orally once a day (at bedtime)  Multiple Vitamins oral tablet: 1 tab(s) orally once a day  thiamine 100 mg oral tablet: 1 tab(s) orally once a day amLODIPine 2.5 mg oral tablet: 1 tab(s) orally once a day  folic acid 1 mg oral tablet: 1 tab(s) orally once a day  mirtazapine 15 mg oral tablet: 1 tab(s) orally once a day (at bedtime)  Multiple Vitamins oral tablet: 1 tab(s) orally once a day  thiamine 100 mg oral tablet: 1 tab(s) orally once a day

## 2020-02-20 NOTE — DIETITIAN INITIAL EVALUATION ADULT. - OTHER INFO
Pt seen for initial assessment. 44F with EtOH abuse, HTN presents with palpitations/anxiety after her last alcoholic drink at 6AM this morning, admitted for management of ETOH withdrawal. Behavioral health following for anxiety, depression, sleep disturbance, alcohol. Skin: no edema, intact. Pt seen resting in bed, awake, alert. Pt is on DASH diet with good PO intake. Reports decreased PO xfew days PTA (reports periods of decreased PO during heavy drinking), no dietary restrictions, no trouble chewing/swallowing. NKFA. Denies GI distress, last BM today. Reports wt stable around 143lbs. Not reporting pain at this time. RD to follow up per protocol.

## 2020-02-20 NOTE — DIETITIAN INITIAL EVALUATION ADULT. - PROBLEM SELECTOR PLAN 2
History of HTN, on Amlodipine 2.5 mg daily, normotensive on admission  - C/w amlodipine 2.5 mg daily

## 2020-02-20 NOTE — DIETITIAN INITIAL EVALUATION ADULT. - PROBLEM SELECTOR PLAN 1
Patient presenting with palpitations, long-standing history of EtOH abuse (for past 8 years), multiple admissions for EtOH withdrawal in the past, no seizures/ICU/intubations/DTs; EtOH level on admission 392; last drink: 6 am on 2/18/2020; s/p Ativan 2 mg IVP x2 and 3 L NS in ED, CIWA 3 in ED for mild anxiety, agitation, sweating.  - Librium 50 mg q8h, taper as tolerated  - Ativan 2 mg IVP PRN for CIWA >8  - CIWA Protocol, monitor q2-4hrs  - Folic Acid/MVI/Thiamine  - Monitor electrolytes and replace PRN

## 2020-02-20 NOTE — DISCHARGE NOTE PROVIDER - NSDCFUADDAPPT_GEN_ALL_CORE_FT
Kaleb Pizano)  Medicine  [address] [place] [address]  Phone: [phone]  Fax: [phone]  Follow Up Time: aKleb Pizano)  Medicine  [address] [place] [address]  Phone: [phone]  Fax: [phone]  Follow Up Time: Please call to schedule an appointment

## 2020-02-20 NOTE — PROGRESS NOTE ADULT - PROBLEM SELECTOR PLAN 5
Transition of care performed with sharing of clinical summary. Plan: 1) PCP Contacted on Admission: (Y) --> Name & Phone #: Dr. Pizano  2) Date of Contact with PCP: 02/18  3) PCP Contacted at Discharge: (Y/N, N/A)  4) Summary of Handoff Given to PCP:   5) Post-Discharge Appointment Date and Location:
Transition of care performed with sharing of clinical summary. Plan: 1) PCP Contacted on Admission: (Y) --> Name & Phone #: Dr. Pizano  2) Date of Contact with PCP: 02/18  3) PCP Contacted at Discharge: (Y/N, N/A)  4) Summary of Handoff Given to PCP:   5) Post-Discharge Appointment Date and Location:

## 2020-02-20 NOTE — DISCHARGE NOTE PROVIDER - NSDCCPCAREPLAN_GEN_ALL_CORE_FT
PRINCIPAL DISCHARGE DIAGNOSIS  Diagnosis: Alcohol withdrawal, uncomplicated  Assessment and Plan of Treatment: You came in with alcohol withdrawel symptoms. We treated you with a medication called librium. Please try to slowly cut down o PRINCIPAL DISCHARGE DIAGNOSIS  Diagnosis: Alcohol withdrawal, uncomplicated  Assessment and Plan of Treatment: You came in with alcohol withdrawel symptoms. We treated you with a medication called librium. Please take one more dose of this medication (1 pill later tonight). Please see your primary care doctor and discuss ways of cutting down on your alcohol use safely. If you develop headaches, nausea, vomiting, abdominal pain, see or hear things that arent there, please return to the emergency room.      SECONDARY DISCHARGE DIAGNOSES  Diagnosis: Alcohol abuse  Assessment and Plan of Treatment: We had the psychiatrists see you and tge    Diagnosis: Hypertension  Assessment and Plan of Treatment: Please take your amlodipine medications as prescribed and follow up with your primary care doctor. If you develop chest pain, shortness of breath, abdominal pain, please return to the emergency room. PRINCIPAL DISCHARGE DIAGNOSIS  Diagnosis: Alcohol withdrawal, uncomplicated  Assessment and Plan of Treatment: You came in with alcohol withdrawel symptoms. We treated you with a medication called librium. Please take one more dose of this medication (1 pill later tonight). Please see your primary care doctor and discuss ways of cutting down on your alcohol use safely. If you develop headaches, nausea, vomiting, abdominal pain, see or hear things that arent there, please return to the emergency room.      SECONDARY DISCHARGE DIAGNOSES  Diagnosis: Alcohol abuse  Assessment and Plan of Treatment: We had the psychiatrists see you and they recommended remeron 15mg once at night to help your sleep at night. Please use remeron to sleep instead of alcohol. Relying on alcohol for sleep is unhealthy and dangerous. The risks of alcohol abuse include liver, heart, and kidney disease which can lead to death. Please follow up with your primary care doctor to discuss safe ways to decrease your alcohol use. Please follow up with a psychiatrist at your clinic to discuss further remeron use.    Diagnosis: Hypertension  Assessment and Plan of Treatment: Please take your amlodipine medications as prescribed and follow up with your primary care doctor. If you develop chest pain, shortness of breath, abdominal pain, please return to the emergency room. PRINCIPAL DISCHARGE DIAGNOSIS  Diagnosis: Alcohol withdrawal, uncomplicated  Assessment and Plan of Treatment: You came in with alcohol withdrawel symptoms. We treated you with a medication called librium. Please see your primary care doctor and discuss ways of cutting down on your alcohol use safely. If you develop headaches, nausea, vomiting, abdominal pain, see or hear things that arent there, please return to the emergency room.      SECONDARY DISCHARGE DIAGNOSES  Diagnosis: Alcohol abuse  Assessment and Plan of Treatment: We had the psychiatrists see you and they recommended remeron 15mg once at night to help your sleep at night. Please use remeron to sleep instead of alcohol. Relying on alcohol for sleep is unhealthy and dangerous. The risks of alcohol abuse include liver, heart, and kidney disease which can lead to death. Please follow up with your primary care doctor to discuss safe ways to decrease your alcohol use. Please follow up with a psychiatrist at your clinic to discuss further remeron use.    Diagnosis: Hypertension  Assessment and Plan of Treatment: Please take your amlodipine medications as prescribed and follow up with your primary care doctor. If you develop chest pain, shortness of breath, abdominal pain, please return to the emergency room.    Diagnosis: Thrombocytopenia  Assessment and Plan of Treatment: You had a slightly below normal platelet count. This may be caused by your alcohol use. Please see your primary care doctor for further monitoring and evaluation. If you develop chest pain, shortness of breath, bruising, bumps on your body, bleeding from your gums, blood in your urine or stool, make sure to return to the emergency room.

## 2020-02-20 NOTE — DIETITIAN INITIAL EVALUATION ADULT. - ENERGY NEEDS
Ht: 5'6", Wt: 64.9kg, IBW: 59.1kg, 109.8%IBW.   Needs calculated based on [facility] standards of care.

## 2020-02-20 NOTE — PROGRESS NOTE ADULT - PROBLEM SELECTOR PLAN 2
History of HTN, on Amlodipine 2.5 mg daily, normotensive on admission  - C/w amlodipine 2.5 mg daily
History of HTN, on Amlodipine 2.5 mg daily, normotensive on admission  - C/w amlodipine 2.5 mg daily

## 2020-02-20 NOTE — PROGRESS NOTE ADULT - PROBLEM SELECTOR PLAN 3
Multiple admissions for EtOH withdrawal in the past, no prior history of withdrawal seizures, DTs. Pt currently attends outpt rehab 3 days/week  -consulted psych who recommended Remeron 15mg qhs as pt drinks alcohol to help her sleep
Multiple admissions for EtOH withdrawal in the past, no prior history of withdrawal seizures, DTs. Pt currently attends outpt rehab 3 days/week  -c/w Remeron 15mg qhs for insomnia per psych

## 2020-02-21 ENCOUNTER — TRANSCRIPTION ENCOUNTER (OUTPATIENT)
Age: 45
End: 2020-02-21

## 2020-02-21 VITALS
DIASTOLIC BLOOD PRESSURE: 73 MMHG | HEART RATE: 96 BPM | OXYGEN SATURATION: 100 % | TEMPERATURE: 98 F | RESPIRATION RATE: 18 BRPM | SYSTOLIC BLOOD PRESSURE: 112 MMHG

## 2020-02-21 LAB
ANION GAP SERPL CALC-SCNC: 10 MMOL/L — SIGNIFICANT CHANGE UP (ref 5–17)
BUN SERPL-MCNC: 14 MG/DL — SIGNIFICANT CHANGE UP (ref 7–23)
CALCIUM SERPL-MCNC: 8.7 MG/DL — SIGNIFICANT CHANGE UP (ref 8.4–10.5)
CHLORIDE SERPL-SCNC: 104 MMOL/L — SIGNIFICANT CHANGE UP (ref 96–108)
CO2 SERPL-SCNC: 26 MMOL/L — SIGNIFICANT CHANGE UP (ref 22–31)
CREAT SERPL-MCNC: 0.57 MG/DL — SIGNIFICANT CHANGE UP (ref 0.5–1.3)
GLUCOSE SERPL-MCNC: 91 MG/DL — SIGNIFICANT CHANGE UP (ref 70–99)
HCT VFR BLD CALC: 39.8 % — SIGNIFICANT CHANGE UP (ref 34.5–45)
HGB BLD-MCNC: 13.2 G/DL — SIGNIFICANT CHANGE UP (ref 11.5–15.5)
MAGNESIUM SERPL-MCNC: 1.8 MG/DL — SIGNIFICANT CHANGE UP (ref 1.6–2.6)
MCHC RBC-ENTMCNC: 32.7 PG — SIGNIFICANT CHANGE UP (ref 27–34)
MCHC RBC-ENTMCNC: 33.2 GM/DL — SIGNIFICANT CHANGE UP (ref 32–36)
MCV RBC AUTO: 98.5 FL — SIGNIFICANT CHANGE UP (ref 80–100)
NRBC # BLD: 0 /100 WBCS — SIGNIFICANT CHANGE UP (ref 0–0)
PLATELET # BLD AUTO: 135 K/UL — LOW (ref 150–400)
POTASSIUM SERPL-MCNC: 4 MMOL/L — SIGNIFICANT CHANGE UP (ref 3.5–5.3)
POTASSIUM SERPL-SCNC: 4 MMOL/L — SIGNIFICANT CHANGE UP (ref 3.5–5.3)
RBC # BLD: 4.04 M/UL — SIGNIFICANT CHANGE UP (ref 3.8–5.2)
RBC # FLD: 12.2 % — SIGNIFICANT CHANGE UP (ref 10.3–14.5)
SODIUM SERPL-SCNC: 140 MMOL/L — SIGNIFICANT CHANGE UP (ref 135–145)
WBC # BLD: 6.78 K/UL — SIGNIFICANT CHANGE UP (ref 3.8–10.5)
WBC # FLD AUTO: 6.78 K/UL — SIGNIFICANT CHANGE UP (ref 3.8–10.5)

## 2020-02-21 PROCEDURE — 83690 ASSAY OF LIPASE: CPT

## 2020-02-21 PROCEDURE — 81025 URINE PREGNANCY TEST: CPT

## 2020-02-21 PROCEDURE — 85027 COMPLETE CBC AUTOMATED: CPT

## 2020-02-21 PROCEDURE — 96361 HYDRATE IV INFUSION ADD-ON: CPT

## 2020-02-21 PROCEDURE — 83735 ASSAY OF MAGNESIUM: CPT

## 2020-02-21 PROCEDURE — 36415 COLL VENOUS BLD VENIPUNCTURE: CPT

## 2020-02-21 PROCEDURE — 80307 DRUG TEST PRSMV CHEM ANLYZR: CPT

## 2020-02-21 PROCEDURE — 84100 ASSAY OF PHOSPHORUS: CPT

## 2020-02-21 PROCEDURE — 81001 URINALYSIS AUTO W/SCOPE: CPT

## 2020-02-21 PROCEDURE — 96376 TX/PRO/DX INJ SAME DRUG ADON: CPT

## 2020-02-21 PROCEDURE — 96374 THER/PROPH/DIAG INJ IV PUSH: CPT

## 2020-02-21 PROCEDURE — 99285 EMERGENCY DEPT VISIT HI MDM: CPT | Mod: 25

## 2020-02-21 PROCEDURE — 80053 COMPREHEN METABOLIC PANEL: CPT

## 2020-02-21 PROCEDURE — 93306 TTE W/DOPPLER COMPLETE: CPT

## 2020-02-21 PROCEDURE — 80048 BASIC METABOLIC PNL TOTAL CA: CPT

## 2020-02-21 RX ORDER — MIRTAZAPINE 45 MG/1
1 TABLET, ORALLY DISINTEGRATING ORAL
Qty: 30 | Refills: 0
Start: 2020-02-21 | End: 2020-03-21

## 2020-02-21 RX ORDER — FOLIC ACID 0.8 MG
1 TABLET ORAL
Qty: 30 | Refills: 0
Start: 2020-02-21 | End: 2020-03-21

## 2020-02-21 RX ORDER — MIRTAZAPINE 45 MG/1
1 TABLET, ORALLY DISINTEGRATING ORAL
Qty: 0 | Refills: 0 | DISCHARGE
Start: 2020-02-21

## 2020-02-21 RX ORDER — AMLODIPINE BESYLATE 2.5 MG/1
1 TABLET ORAL
Qty: 30 | Refills: 0
Start: 2020-02-21 | End: 2020-03-21

## 2020-02-21 RX ORDER — THIAMINE MONONITRATE (VIT B1) 100 MG
1 TABLET ORAL
Qty: 30 | Refills: 0
Start: 2020-02-21 | End: 2020-03-21

## 2020-02-21 RX ORDER — MAGNESIUM SULFATE 500 MG/ML
2 VIAL (ML) INJECTION ONCE
Refills: 0 | Status: COMPLETED | OUTPATIENT
Start: 2020-02-21 | End: 2020-02-21

## 2020-02-21 RX ADMIN — Medication 25 MILLIGRAM(S): at 02:14

## 2020-02-21 RX ADMIN — Medication 25 MILLIGRAM(S): at 13:18

## 2020-02-21 RX ADMIN — Medication 1 TABLET(S): at 13:19

## 2020-02-21 RX ADMIN — Medication 100 MILLIGRAM(S): at 13:19

## 2020-02-21 RX ADMIN — Medication 50 GRAM(S): at 13:19

## 2020-02-21 RX ADMIN — AMLODIPINE BESYLATE 2.5 MILLIGRAM(S): 2.5 TABLET ORAL at 05:48

## 2020-02-21 RX ADMIN — Medication 1 MILLIGRAM(S): at 13:19

## 2020-02-21 NOTE — DISCHARGE NOTE NURSING/CASE MANAGEMENT/SOCIAL WORK - NSDCFUADDAPPT_GEN_ALL_CORE_FT
Kaleb Pizano)  Medicine  [address] [place] [address]  Phone: [phone]  Fax: [phone]  Follow Up Time: Please call to schedule an appointment

## 2020-02-21 NOTE — DISCHARGE NOTE NURSING/CASE MANAGEMENT/SOCIAL WORK - PATIENT PORTAL LINK FT
You can access the FollowMyHealth Patient Portal offered by [facility] by registering at the following website: [email or web address]. By joining [employer]’s FollowMyHealth portal, you will also be able to view your health information using other applications (apps) compatible with our system.

## 2020-02-26 DIAGNOSIS — D69.6 THROMBOCYTOPENIA, UNSPECIFIED: ICD-10-CM

## 2020-02-26 DIAGNOSIS — F32.9 MAJOR DEPRESSIVE DISORDER, SINGLE EPISODE, UNSPECIFIED: ICD-10-CM

## 2020-02-26 DIAGNOSIS — F10.239 ALCOHOL DEPENDENCE WITH WITHDRAWAL, UNSPECIFIED: ICD-10-CM
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
## 2020-02-26 DIAGNOSIS — F41.9 ANXIETY DISORDER, UNSPECIFIED: ICD-10-CM

## 2020-02-26 DIAGNOSIS — F10.229 ALCOHOL DEPENDENCE WITH INTOXICATION, UNSPECIFIED: ICD-10-CM

## 2020-02-26 DIAGNOSIS — I10 ESSENTIAL (PRIMARY) HYPERTENSION: ICD-10-CM

## 2020-03-02 NOTE — PATIENT PROFILE ADULT - CONTRAINDICATIONS & PRECAUTIONS (SELECT ALL THAT APPLY)
· Has mixed hyperlipidemia, but is on no medications currently   · Will hold on any additional medications Patient/surrogate refused vaccine...

## 2020-04-22 DIAGNOSIS — Z71.89 OTHER SPECIFIED COUNSELING: ICD-10-CM

## 2020-05-07 ENCOUNTER — INPATIENT (INPATIENT)
Facility: HOSPITAL | Age: 45
LOS: 4 days | Discharge: HOME | DRG: 897 | End: 2020-05-12
Admitting: HOSPITALIST
Payer: MEDICAID

## 2020-05-07 VITALS
OXYGEN SATURATION: 98 % | TEMPERATURE: 98 F | HEART RATE: 132 BPM | RESPIRATION RATE: 22 BRPM | HEIGHT: 66 IN | DIASTOLIC BLOOD PRESSURE: 91 MMHG | WEIGHT: 134.92 LBS | SYSTOLIC BLOOD PRESSURE: 198 MMHG

## 2020-05-07 DIAGNOSIS — Z98.890 OTHER SPECIFIED POSTPROCEDURAL STATES: Chronic | ICD-10-CM

## 2020-05-07 LAB
ALBUMIN SERPL ELPH-MCNC: 4.3 G/DL — SIGNIFICANT CHANGE UP (ref 3.3–5)
ALP SERPL-CCNC: 49 U/L — SIGNIFICANT CHANGE UP (ref 40–120)
ALT FLD-CCNC: 49 U/L — HIGH (ref 10–45)
ANION GAP SERPL CALC-SCNC: 20 MMOL/L — HIGH (ref 5–17)
APPEARANCE UR: CLEAR — SIGNIFICANT CHANGE UP
AST SERPL-CCNC: 86 U/L — HIGH (ref 10–40)
BILIRUB SERPL-MCNC: 0.9 MG/DL — SIGNIFICANT CHANGE UP (ref 0.2–1.2)
BILIRUB UR-MCNC: NEGATIVE — SIGNIFICANT CHANGE UP
BUN SERPL-MCNC: 6 MG/DL — LOW (ref 7–23)
CALCIUM SERPL-MCNC: 9 MG/DL — SIGNIFICANT CHANGE UP (ref 8.4–10.5)
CHLORIDE SERPL-SCNC: 99 MMOL/L — SIGNIFICANT CHANGE UP (ref 96–108)
CO2 SERPL-SCNC: 21 MMOL/L — LOW (ref 22–31)
COLOR SPEC: YELLOW — SIGNIFICANT CHANGE UP
CREAT SERPL-MCNC: 0.56 MG/DL — SIGNIFICANT CHANGE UP (ref 0.5–1.3)
DIFF PNL FLD: NEGATIVE — SIGNIFICANT CHANGE UP
ETHANOL SERPL-MCNC: 283 MG/DL — HIGH (ref 0–10)
GLUCOSE SERPL-MCNC: 84 MG/DL — SIGNIFICANT CHANGE UP (ref 70–99)
GLUCOSE UR QL: NEGATIVE — SIGNIFICANT CHANGE UP
HCG SERPL-ACNC: <0 MIU/ML — SIGNIFICANT CHANGE UP
HCT VFR BLD CALC: 40.8 % — SIGNIFICANT CHANGE UP (ref 34.5–45)
HGB BLD-MCNC: 13.9 G/DL — SIGNIFICANT CHANGE UP (ref 11.5–15.5)
KETONES UR-MCNC: ABNORMAL MG/DL
LEUKOCYTE ESTERASE UR-ACNC: NEGATIVE — SIGNIFICANT CHANGE UP
MCHC RBC-ENTMCNC: 32.9 PG — SIGNIFICANT CHANGE UP (ref 27–34)
MCHC RBC-ENTMCNC: 34.1 GM/DL — SIGNIFICANT CHANGE UP (ref 32–36)
MCV RBC AUTO: 96.5 FL — SIGNIFICANT CHANGE UP (ref 80–100)
NITRITE UR-MCNC: NEGATIVE — SIGNIFICANT CHANGE UP
NRBC # BLD: 0 /100 WBCS — SIGNIFICANT CHANGE UP (ref 0–0)
PH UR: 7 — SIGNIFICANT CHANGE UP (ref 5–8)
PLATELET # BLD AUTO: 154 K/UL — SIGNIFICANT CHANGE UP (ref 150–400)
POTASSIUM SERPL-MCNC: 3.7 MMOL/L — SIGNIFICANT CHANGE UP (ref 3.5–5.3)
POTASSIUM SERPL-SCNC: 3.7 MMOL/L — SIGNIFICANT CHANGE UP (ref 3.5–5.3)
PROT SERPL-MCNC: 6.9 G/DL — SIGNIFICANT CHANGE UP (ref 6–8.3)
PROT UR-MCNC: NEGATIVE MG/DL — SIGNIFICANT CHANGE UP
RBC # BLD: 4.23 M/UL — SIGNIFICANT CHANGE UP (ref 3.8–5.2)
RBC # FLD: 14.5 % — SIGNIFICANT CHANGE UP (ref 10.3–14.5)
SARS-COV-2 RNA SPEC QL NAA+PROBE: SIGNIFICANT CHANGE UP
SODIUM SERPL-SCNC: 140 MMOL/L — SIGNIFICANT CHANGE UP (ref 135–145)
SP GR SPEC: 1.01 — SIGNIFICANT CHANGE UP (ref 1–1.03)
UROBILINOGEN FLD QL: 0.2 E.U./DL — SIGNIFICANT CHANGE UP
WBC # BLD: 4.46 K/UL — SIGNIFICANT CHANGE UP (ref 3.8–10.5)
WBC # FLD AUTO: 4.46 K/UL — SIGNIFICANT CHANGE UP (ref 3.8–10.5)

## 2020-05-07 PROCEDURE — 93010 ELECTROCARDIOGRAM REPORT: CPT

## 2020-05-07 PROCEDURE — 99285 EMERGENCY DEPT VISIT HI MDM: CPT

## 2020-05-07 RX ORDER — AMLODIPINE BESYLATE 2.5 MG/1
2.5 TABLET ORAL DAILY
Refills: 0 | Status: DISCONTINUED | OUTPATIENT
Start: 2020-05-07 | End: 2020-05-11

## 2020-05-07 RX ORDER — FOLIC ACID 0.8 MG
1 TABLET ORAL DAILY
Refills: 0 | Status: DISCONTINUED | OUTPATIENT
Start: 2020-05-07 | End: 2020-05-12

## 2020-05-07 RX ORDER — MIRTAZAPINE 45 MG/1
15 TABLET, ORALLY DISINTEGRATING ORAL AT BEDTIME
Refills: 0 | Status: DISCONTINUED | OUTPATIENT
Start: 2020-05-07 | End: 2020-05-08

## 2020-05-07 RX ORDER — THIAMINE MONONITRATE (VIT B1) 100 MG
100 TABLET ORAL DAILY
Refills: 0 | Status: COMPLETED | OUTPATIENT
Start: 2020-05-07 | End: 2020-05-10

## 2020-05-07 RX ORDER — SODIUM CHLORIDE 9 MG/ML
1000 INJECTION INTRAMUSCULAR; INTRAVENOUS; SUBCUTANEOUS ONCE
Refills: 0 | Status: COMPLETED | OUTPATIENT
Start: 2020-05-07 | End: 2020-05-07

## 2020-05-07 RX ADMIN — SODIUM CHLORIDE 1000 MILLILITER(S): 9 INJECTION INTRAMUSCULAR; INTRAVENOUS; SUBCUTANEOUS at 20:51

## 2020-05-07 RX ADMIN — SODIUM CHLORIDE 1000 MILLILITER(S): 9 INJECTION INTRAMUSCULAR; INTRAVENOUS; SUBCUTANEOUS at 21:35

## 2020-05-07 RX ADMIN — Medication 2 MILLIGRAM(S): at 20:51

## 2020-05-07 RX ADMIN — SODIUM CHLORIDE 1000 MILLILITER(S): 9 INJECTION INTRAMUSCULAR; INTRAVENOUS; SUBCUTANEOUS at 20:35

## 2020-05-07 RX ADMIN — SODIUM CHLORIDE 1000 MILLILITER(S): 9 INJECTION INTRAMUSCULAR; INTRAVENOUS; SUBCUTANEOUS at 21:51

## 2020-05-07 RX ADMIN — Medication 2 MILLIGRAM(S): at 22:22

## 2020-05-07 NOTE — ED ADULT TRIAGE NOTE - CHIEF COMPLAINT QUOTE
had been drinking everyday this past 4 weeks (last time today) - this past few days my heartbeat is beating fast; also had 2 episodes of nosebleed today; denies blood thinners

## 2020-05-07 NOTE — ED PROVIDER NOTE - OBJECTIVE STATEMENT
44F pmh htn, etoh abuse, multiple prior admits for withdrawal p/w palpitations, racing heart feeling and mild fatigue after attempting self-taper for past month. last admitted february 2020 for same. Last drink today, 1/4 liter hard liquour, usually drinks 1/2 per day. States has been trying to self taper. Denies AVH,   Similar to prior episodes. No itching, no avh, no seizures, intubations, dt.  No cough, no f/c, no n/v, no abd pain, no cp.   Also notes 2x epistaxis today, denies prior episodes.

## 2020-05-07 NOTE — ED ADULT NURSE NOTE - OBJECTIVE STATEMENT
had been drinking everyday this past 4 weeks (last time today) - this past few days my heartbeat is beating fast; also had 2 episodes of nosebleed today; denies blood thinners  no active epistaxis, no tremors, steady gait noted, denies complaint otherwise, EDT to bedside for EKG upon presentation from triage

## 2020-05-07 NOTE — ED PROVIDER NOTE - CLINICAL SUMMARY MEDICAL DECISION MAKING FREE TEXT BOX
44F pmh htn, etoh abuse, multiple prior admits for withdrawal p/w palpitations, racing heart feeling and mild fatigue after attempting self-taper for past month. last admitted february 2020 for same. Last drink today, 1/4 liter hard liquour, usually drinks 1/2 per day. States has been trying to self taper. Exam noted for tachycardia, tremulousness, no sig htn. Concern alcohol withdrawal, dehydration, electrolyte abnormality, no e/o DT. Not suspicious for COVID. 44F pmh htn, etoh abuse, multiple prior admits for withdrawal p/w palpitations, racing heart feeling and mild fatigue after attempting self-taper for past month. last admitted february 2020 for same. Last drink today, 1/4 liter hard liquour, usually drinks 1/2 per day. States has been trying to self taper. Exam noted for tachycardia, tremulousness, no sig htn. Concern alcohol withdrawal, dehydration, electrolyte abnormality, no e/o DT. Not suspicious for COVID. Improved w/ IVF and ativan 2mg x2. 44F pmh htn, etoh abuse, multiple prior admits for withdrawal p/w palpitations, racing heart feeling and mild fatigue after attempting self-taper for past month. last admitted february 2020 for same. Last drink today, 1/4 liter hard liqueur, usually drinks 1/2 per day. States has been trying to self taper. Exam noted for tachycardia, tremulousness, no sig htn. Concern alcohol withdrawal, dehydration, electrolyte abnormality, no e/o DT. Not suspicious for COVID. Improved w/ IVF and ativan 2mg x2.

## 2020-05-07 NOTE — ED ADULT NURSE REASSESSMENT NOTE - NS ED NURSE REASSESS COMMENT FT1
interventions as noted, [email or web address], provided with warm blanket for comfort, Dr. Walker at bedside for pt. evaluation, assessment on-going

## 2020-05-07 NOTE — ED PROVIDER NOTE - PHYSICAL EXAMINATION
VITAL SIGNS: I have reviewed nursing notes and confirm.  CONSTITUTIONAL: Well-developed; well-nourished; in mild distress.  SKIN: Skin is warm and dry, no acute rash.  HEAD: Normocephalic; atraumatic.  EYES:  EOM intact; conjunctiva and sclera clear.  ENT: +R nares dried bloody tissue; airway clear.  NECK: Supple; Voluntary FROM  CARD: No rubs appreciated, tachycardic rate and rhythm.  RESP: No wheezes, no rales. No respiratory distress  ABD: Soft; non-distended; non-tender; no rebound or guarding  EXT: Normal ROM. No cyanosis or edema.  NEURO: Alert, oriented, tremulous, otherwise unremarkable.  PSYCH: Cooperative, near-tearful, appropriate.

## 2020-05-07 NOTE — ED ADULT NURSE REASSESSMENT NOTE - NS ED NURSE REASSESS COMMENT FT1
pt. consuming snacks from bag without difficulty, given water to drink, per request, asking if she can call and order something from the cafeteria, informed cafeteria closed until am, with understanding verbalized, offered sandwich

## 2020-05-07 NOTE — ED ADULT NURSE NOTE - CHPI ED NUR SYMPTOMS NEG
no tingling/no weakness/no dizziness/no fever/no nausea/no chills/no pain/no vomiting/no decreased eating/drinking

## 2020-05-08 DIAGNOSIS — E87.2 ACIDOSIS: ICD-10-CM

## 2020-05-08 LAB
ALBUMIN SERPL ELPH-MCNC: 3.8 G/DL — SIGNIFICANT CHANGE UP (ref 3.3–5)
ALP SERPL-CCNC: 42 U/L — SIGNIFICANT CHANGE UP (ref 40–120)
ALT FLD-CCNC: 38 U/L — SIGNIFICANT CHANGE UP (ref 10–45)
ANION GAP SERPL CALC-SCNC: 11 MMOL/L — SIGNIFICANT CHANGE UP (ref 5–17)
AST SERPL-CCNC: 62 U/L — HIGH (ref 10–40)
B-OH-BUTYR SERPL-SCNC: 1.8 MMOL/L — HIGH
BILIRUB SERPL-MCNC: 1 MG/DL — SIGNIFICANT CHANGE UP (ref 0.2–1.2)
BUN SERPL-MCNC: 6 MG/DL — LOW (ref 7–23)
CALCIUM SERPL-MCNC: 8.2 MG/DL — LOW (ref 8.4–10.5)
CHLORIDE SERPL-SCNC: 100 MMOL/L — SIGNIFICANT CHANGE UP (ref 96–108)
CO2 SERPL-SCNC: 26 MMOL/L — SIGNIFICANT CHANGE UP (ref 22–31)
CREAT SERPL-MCNC: 0.64 MG/DL — SIGNIFICANT CHANGE UP (ref 0.5–1.3)
GLUCOSE SERPL-MCNC: 108 MG/DL — HIGH (ref 70–99)
MAGNESIUM SERPL-MCNC: 1.2 MG/DL — LOW (ref 1.6–2.6)
PHOSPHATE SERPL-MCNC: 3.4 MG/DL — SIGNIFICANT CHANGE UP (ref 2.5–4.5)
POTASSIUM SERPL-MCNC: 3.5 MMOL/L — SIGNIFICANT CHANGE UP (ref 3.5–5.3)
POTASSIUM SERPL-SCNC: 3.5 MMOL/L — SIGNIFICANT CHANGE UP (ref 3.5–5.3)
PROT SERPL-MCNC: 6 G/DL — SIGNIFICANT CHANGE UP (ref 6–8.3)
SODIUM SERPL-SCNC: 137 MMOL/L — SIGNIFICANT CHANGE UP (ref 135–145)

## 2020-05-08 PROCEDURE — 99223 1ST HOSP IP/OBS HIGH 75: CPT | Mod: GC

## 2020-05-08 RX ORDER — POTASSIUM CHLORIDE 20 MEQ
40 PACKET (EA) ORAL ONCE
Refills: 0 | Status: COMPLETED | OUTPATIENT
Start: 2020-05-08 | End: 2020-05-08

## 2020-05-08 RX ORDER — MAGNESIUM SULFATE 500 MG/ML
4 VIAL (ML) INJECTION ONCE
Refills: 0 | Status: COMPLETED | OUTPATIENT
Start: 2020-05-08 | End: 2020-05-08

## 2020-05-08 RX ORDER — POTASSIUM CHLORIDE 20 MEQ
20 PACKET (EA) ORAL ONCE
Refills: 0 | Status: COMPLETED | OUTPATIENT
Start: 2020-05-08 | End: 2020-05-08

## 2020-05-08 RX ADMIN — Medication 1 MILLIGRAM(S): at 09:34

## 2020-05-08 RX ADMIN — Medication 20 MILLIEQUIVALENT(S): at 12:59

## 2020-05-08 RX ADMIN — Medication 100 MILLIGRAM(S): at 09:33

## 2020-05-08 RX ADMIN — Medication 2 MILLIGRAM(S): at 09:33

## 2020-05-08 RX ADMIN — Medication 100 GRAM(S): at 14:50

## 2020-05-08 RX ADMIN — Medication 50 MILLIGRAM(S): at 00:02

## 2020-05-08 RX ADMIN — AMLODIPINE BESYLATE 2.5 MILLIGRAM(S): 2.5 TABLET ORAL at 05:26

## 2020-05-08 RX ADMIN — Medication 1 TABLET(S): at 09:33

## 2020-05-08 RX ADMIN — Medication 50 MILLIGRAM(S): at 21:54

## 2020-05-08 RX ADMIN — Medication 50 MILLIGRAM(S): at 13:00

## 2020-05-08 RX ADMIN — Medication 50 MILLIGRAM(S): at 05:26

## 2020-05-08 RX ADMIN — Medication 40 MILLIEQUIVALENT(S): at 13:00

## 2020-05-08 NOTE — PROGRESS NOTE ADULT - SUBJECTIVE AND OBJECTIVE BOX
Patient examined at bedside with no acute events overnight. Patient states she is feeling palpitations but thinks she is doing a bit better. Denies any other ROS.    T(C): 36.9 (20 @ 08:42), Max: 37.6 (20 @ 02:28)  HR: 115 (20 @ 08:42) (100 - 132)  BP: 156/94 (20 @ 08:42) (120/72 - 198/91)  RR: 20 (20 @ 08:42) (17 - 22)  SpO2: 98% (20 @ 08:42) (96% - 100%)  Wt(kg): --Vital Signs Last 24 Hrs      Review of Systems:  -All other ROS negative, except those noted in HPI    PHYSICAL EXAM:  GENERAL: NAD, sitting in bed  HEAD:  Atraumatic, Normocephalic  EYES: EOMI, PERRLA, conjunctiva and sclera clear  ENMT: Moist mucous membranes, Good dentition, No lesions  NECK: Supple, No JVD  NERVOUS SYSTEM:  Alert & Oriented X3, Good concentration; CIWA 4 (outstretched hands)  CHEST/LUNG: Clear to percussion bilaterally; No rales, rhonchi, wheezing, or rubs  HEART: normal s1 and s2 No murmurs, rubs, or gallops, tachycardic   ABDOMEN: Soft, Nontender, Nondistended; Bowel sounds present  EXTREMITIES:  2+ Peripheral Pulses, No clubbing, cyanosis, or edema  LYMPH: No lymphadenopathy noted  SKIN: No rashes or lesions    amLODIPine   Tablet 2.5 milliGRAM(s) Oral daily  chlordiazePOXIDE 50 milliGRAM(s) Oral every 8 hours  chlordiazePOXIDE   Oral   folic acid 1 milliGRAM(s) Oral daily  LORazepam   Injectable 2 milliGRAM(s) IV Push every 2 hours PRN  multivitamin 1 Tablet(s) Oral daily  potassium chloride   Powder 40 milliEquivalent(s) Oral once  potassium chloride   Powder 20 milliEquivalent(s) Oral once  thiamine 100 milliGRAM(s) Oral daily      LABS:                        13.9   4.46  )-----------( 154      ( 07 May 2020 20:39 )             40.8     05-08    137  |  100  |  6<L>  ----------------------------<  108<H>  3.5   |  26  |  0.64    Ca    8.2<L>      08 May 2020 10:16    TPro  6.0  /  Alb  3.8  /  TBili  1.0  /  DBili  x   /  AST  62<H>  /  ALT  38  /  AlkPhos  42  05-08      Urinalysis Basic - ( 07 May 2020 21:52 )    Color: Yellow / Appearance: Clear / S.010 / pH: x  Gluc: x / Ketone: Trace mg/dL  / Bili: Negative / Urobili: 0.2 E.U./dL   Blood: x / Protein: NEGATIVE mg/dL / Nitrite: NEGATIVE   Leuk Esterase: NEGATIVE / RBC: x / WBC x   Sq Epi: x / Non Sq Epi: x / Bacteria: x      CAPILLARY BLOOD GLUCOSE            Urinalysis Basic - ( 07 May 2020 21:52 )    Color: Yellow / Appearance: Clear / S.010 / pH: x  Gluc: x / Ketone: Trace mg/dL  / Bili: Negative / Urobili: 0.2 E.U./dL   Blood: x / Protein: NEGATIVE mg/dL / Nitrite: NEGATIVE   Leuk Esterase: NEGATIVE / RBC: x / WBC x   Sq Epi: x / Non Sq Epi: x / Bacteria: x

## 2020-05-08 NOTE — H&P ADULT - HISTORY OF PRESENT ILLNESS
44 year old female with HTN, alcohol abuse, multiple prior admits for withdrawal who presents after trying to self-taper herself down from EtOH. As per patient she relapsed back in March and since that time has been slowly tapering herself down. She normally drinks 1/2 bottle of vodka a day, but today had 1/4. She had severe shakes, diaphoresis but no hallucinations. She felt like she needed to come to the ED given severity of symptoms. She has never had DTs or seizures in the past, never been intubated due to EtOH use.    In the ED T98.1, , /91, RR22 98%. She was given 4mg Ativan and 2L NS.

## 2020-05-08 NOTE — PROGRESS NOTE ADULT - PROBLEM SELECTOR PLAN 1
Patient presenting with palpitations, long-standing history of EtOH abuse (for past 8 years), multiple admissions for EtOH withdrawal in the past, no seizures/ICU/intubations/DTs; CIWA 4 today (for tremor)  - Librium 50 mg q8h, taper as tolerated  - Ativan 2 mg IVP PRN for CIWA >8  - CIWA Protocol, monitor q2-4hrs  - Folic Acid/MVI/Thiamine  - Monitor electrolytes and replace PRN

## 2020-05-08 NOTE — H&P ADULT - PROBLEM SELECTOR PLAN 6
Transition of care performed with sharing of clinical summary. Plan: 1) PCP Contacted on Admission: (Y) --> Name & Phone #: Dr. Pizano; deferring admission to his service at this time. Call made to Dr. Diaz, awaiting response  2) Date of Contact with PCP:   3) PCP Contacted at Discharge: (Y/N, N/A)  4) Summary of Handoff Given to PCP:   5) Post-Discharge Appointment Date and Location:

## 2020-05-08 NOTE — H&P ADULT - PROBLEM SELECTOR PLAN 1
Patient presenting with palpitations, long-standing history of EtOH abuse (for past 8 years), multiple admissions for EtOH withdrawal in the past, no seizures/ICU/intubations/DTs; EtOH level on admission last drink: s/p Ativan 2 mg IVP x2 and 2L NS in ED, CIWA 3 in ED for tremor.  - Librium 50 mg q8h, taper as tolerated  - Ativan 2 mg IVP PRN for CIWA >8  - CIWA Protocol, monitor q2-4hrs  - Folic Acid/MVI/Thiamine  - Monitor electrolytes and replace PRN

## 2020-05-08 NOTE — PROGRESS NOTE ADULT - PROBLEM SELECTOR PLAN 5
F: s/p 2L IVF  E: Replete K<4, Mg<2  N: Reg  Code: Full  DVT ppx: low risk, no pharmacologic ppx needed (Padua score 0)  GI ppx: not needed  Dispo: [facility]

## 2020-05-08 NOTE — H&P ADULT - NSHPLABSRESULTS_GEN_ALL_CORE
LABS                        13.9   4.46  )-----------( 154      ( 07 May 2020 20:39 )             40.8     05-07    140  |  99  |  6<L>  ----------------------------<  84  3.7   |  21<L>  |  0.56    Ca    9.0      07 May 2020 20:39    TPro  6.9  /  Alb  4.3  /  TBili  0.9  /  DBili  x   /  AST  86<H>  /  ALT  49<H>  /  AlkPhos  49  05-07      Urinalysis Basic - ( 07 May 2020 21:52 )    Color: Yellow / Appearance: Clear / S.010 / pH: x  Gluc: x / Ketone: Trace mg/dL  / Bili: Negative / Urobili: 0.2 E.U./dL   Blood: x / Protein: NEGATIVE mg/dL / Nitrite: NEGATIVE   Leuk Esterase: NEGATIVE / RBC: x / WBC x   Sq Epi: x / Non Sq Epi: x / Bacteria: x

## 2020-05-08 NOTE — H&P ADULT - NSHPPHYSICALEXAM_GEN_ALL_CORE
VITALS  Vital Signs Last 24 Hrs  T(C): 37.1 (07 May 2020 22:49), Max: 37.1 (07 May 2020 22:49)  T(F): 98.7 (07 May 2020 22:49), Max: 98.7 (07 May 2020 22:49)  HR: 106 (07 May 2020 22:49) (100 - 132)  BP: 134/82 (07 May 2020 22:49) (128/82 - 198/91)  BP(mean): --  RR: 18 (07 May 2020 22:49) (17 - 22)  SpO2: 96% (07 May 2020 22:49) (96% - 99%)    I&O's Summary    07 May 2020 07:01  -  08 May 2020 00:09  --------------------------------------------------------  IN: 2000 mL / OUT: 0 mL / NET: 2000 mL        CAPILLARY BLOOD GLUCOSE          PHYSICAL EXAM  General: A&Ox3; NAD  Head: NC/AT; PERRL; EOMI; anicteric sclera  Neck: Supple; no JVD  Respiratory: CTA B/L; no wheezes/crackles/rales auscultated w/ good air movement  Cardiovascular: Regular rhythm/rate; S1/S2; no gallops or murmurs auscultated  Gastrointestinal: Soft; NTND w/out rebound tenderness or guarding; bowel sounds normal  Extremities: WWP; no edema or cyanosis; radial/pedal pulses palpable  Neurological:  CNII-XII grossly intact; tremor at baseline; no obvious focal deficits  Psych: Normal affect, no agitation or anxiety noted    CIWA: 3 for tremor

## 2020-05-08 NOTE — H&P ADULT - ASSESSMENT
44F with EtOH abuse, HTN presents with palpitations/anxiety after her last alcoholic drink this morning, admitted for management of ETOH withdrawal.

## 2020-05-08 NOTE — PROGRESS NOTE ADULT - PROBLEM SELECTOR PROBLEM 3
12/24/17 0734   Patient Assessment/Suction   Level of Consciousness (AVPU) alert   Respiratory Effort Normal;Unlabored   All Lung Fields Breath Sounds coarse   Cough Frequency infrequent   Cough Type nonproductive   PRE-TX-O2-ETCO2   O2 Device (Oxygen Therapy) nasal cannula   $ Is the patient on Low Flow Oxygen? Yes   Flow (L/min) 0.5   SpO2 (!) 97 %   Pulse Oximetry Type Continuous   $ Pulse Oximetry - Multiple Charge Pulse Oximetry - Multiple   Pulse 137   Resp (!) 36   Aerosol Therapy   $ Aerosol Therapy Charges Aerosol Treatment   Respiratory Treatment Status given   SVN/Inhaler Treatment Route blow by   Position During Treatment HOB at 30 degrees   Patient Tolerance good   Post-Treatment   Post-treatment Heart Rate (beats/min) 156   Post-treatment Resp Rate (breaths/min) 40   All Fields Breath Sounds aeration increased   Chest Physiotherapy   $ Chest Physiotherapy Charges Subsequent   Patient Tolerance good   Chest Physiotherapy Type percussion   Method manual percussor   Position HOB elevated 30 degrees   Lung Fields/Time Cycle (all)   Total Time (Min) 10      Alcohol abuse

## 2020-05-08 NOTE — PROGRESS NOTE ADULT - PROBLEM SELECTOR PLAN 3
Multiple admissions for EtOH withdrawal in the past, no prior history of withdrawal seizures, DTs. Pt currently attends outpt rehab 3 days/week  -Discussed goals of quitting and abstaining

## 2020-05-08 NOTE — H&P ADULT - PROBLEM SELECTOR PLAN 3
Multiple admissions for EtOH withdrawal in the past, no prior history of withdrawal seizures, DTs. Pt currently attends outpt rehab 3 days/week  -Discussed goals of quitting and abstaining,  support in AM

## 2020-05-08 NOTE — H&P ADULT - PROBLEM SELECTOR PLAN 4
Acidosis on admission with slight AG, likely from ketoacidosis from EtOH use  -Patient eating sandwich when seen in ED  -Continue Hydration as needed  -F/U AM BMP

## 2020-05-08 NOTE — H&P ADULT - PROBLEM SELECTOR PLAN 5
F: s/p 2L IVF  E: Replete K<4, Mg<2  N: Reg  Code: Full  DVT ppx: low risk, no pharmacologic ppx needed (Padua score 0)  GI ppx: not needed  Dispo: [facility]

## 2020-05-09 DIAGNOSIS — F41.9 ANXIETY DISORDER, UNSPECIFIED: ICD-10-CM

## 2020-05-09 LAB
ANION GAP SERPL CALC-SCNC: 11 MMOL/L — SIGNIFICANT CHANGE UP (ref 5–17)
BUN SERPL-MCNC: 4 MG/DL — LOW (ref 7–23)
CALCIUM SERPL-MCNC: 8.9 MG/DL — SIGNIFICANT CHANGE UP (ref 8.4–10.5)
CHLORIDE SERPL-SCNC: 102 MMOL/L — SIGNIFICANT CHANGE UP (ref 96–108)
CO2 SERPL-SCNC: 24 MMOL/L — SIGNIFICANT CHANGE UP (ref 22–31)
CREAT SERPL-MCNC: 0.52 MG/DL — SIGNIFICANT CHANGE UP (ref 0.5–1.3)
GLUCOSE SERPL-MCNC: 99 MG/DL — SIGNIFICANT CHANGE UP (ref 70–99)
MAGNESIUM SERPL-MCNC: 2.2 MG/DL — SIGNIFICANT CHANGE UP (ref 1.6–2.6)
PHOSPHATE SERPL-MCNC: 3.3 MG/DL — SIGNIFICANT CHANGE UP (ref 2.5–4.5)
POTASSIUM SERPL-MCNC: 4.3 MMOL/L — SIGNIFICANT CHANGE UP (ref 3.5–5.3)
POTASSIUM SERPL-SCNC: 4.3 MMOL/L — SIGNIFICANT CHANGE UP (ref 3.5–5.3)
SODIUM SERPL-SCNC: 137 MMOL/L — SIGNIFICANT CHANGE UP (ref 135–145)

## 2020-05-09 PROCEDURE — 99233 SBSQ HOSP IP/OBS HIGH 50: CPT | Mod: GC

## 2020-05-09 RX ORDER — SODIUM CHLORIDE 9 MG/ML
1000 INJECTION, SOLUTION INTRAVENOUS ONCE
Refills: 0 | Status: COMPLETED | OUTPATIENT
Start: 2020-05-09 | End: 2020-05-09

## 2020-05-09 RX ADMIN — Medication 1 MILLIGRAM(S): at 11:34

## 2020-05-09 RX ADMIN — SODIUM CHLORIDE 500 MILLILITER(S): 9 INJECTION, SOLUTION INTRAVENOUS at 15:52

## 2020-05-09 RX ADMIN — Medication 100 MILLIGRAM(S): at 11:35

## 2020-05-09 RX ADMIN — Medication 50 MILLIGRAM(S): at 21:18

## 2020-05-09 RX ADMIN — AMLODIPINE BESYLATE 2.5 MILLIGRAM(S): 2.5 TABLET ORAL at 07:06

## 2020-05-09 RX ADMIN — Medication 1 TABLET(S): at 11:34

## 2020-05-09 RX ADMIN — Medication 50 MILLIGRAM(S): at 07:06

## 2020-05-09 NOTE — PROGRESS NOTE ADULT - PROBLEM SELECTOR PLAN 5
Patient endorses feeling uneasy and rapid heartbeat when abstaining from alcohol. Endorses wanting to drink when she feels her heart racing  - Possible 2/2 withdrawal; however, likely anxiety component  - Consider Hydroxyzine vs. Propanolol PRN for tachycardia

## 2020-05-09 NOTE — PROGRESS NOTE ADULT - PROBLEM SELECTOR PLAN 6
F: s/p 2L IVF  E: Replete K<4, Mg<2  N: Reg  Code: Full  DVT ppx: low risk, no pharmacologic ppx needed (Padua score 0)  GI ppx: not needed  Dispo: [facility]

## 2020-05-09 NOTE — PROGRESS NOTE ADULT - SUBJECTIVE AND OBJECTIVE BOX
Patient examined at bedside with no acute events overnight. Ms. Gonzalez states she feels less anxious this morning. Denies any auditory, visual or tactile disturbances.     Vital Signs Last 24 Hrs  T(C): 35.9 (09 May 2020 11:09), Max: 37 (09 May 2020 05:31)  T(F): 96.7 (09 May 2020 11:09), Max: 98.6 (09 May 2020 05:31)  HR: 113 (09 May 2020 11:09) (108 - 127)  BP: 123/88 (09 May 2020 11:09) (123/88 - 146/95)  RR: 18 (09 May 2020 11:09) (18 - 20)  SpO2: 97% (09 May 2020 11:09) (97% - 99%)      Review of Systems:  -All other ROS negative, except those noted in HPI    PHYSICAL EXAM:  GENERAL: NAD, sitting in bed  HEAD:  Atraumatic, Normocephalic  EYES: EOMI, PERRLA, conjunctiva and sclera clear  ENMT: Moist mucous membranes, Good dentition, No lesions  NECK: Supple, No JVD  NERVOUS SYSTEM:  Alert & Oriented X3, Good concentration; CIWA 4 (outstretched hands)  CHEST/LUNG: Clear to percussion bilaterally; No rales, rhonchi, wheezing, or rubs  HEART: normal s1 and s2 No murmurs, rubs, or gallops, tachycardic   ABDOMEN: Soft, Nontender, Nondistended; Bowel sounds present  EXTREMITIES:  2+ Peripheral Pulses, No clubbing, cyanosis, or edema  LYMPH: No lymphadenopathy noted  SKIN: No rashes or lesions    amLODIPine   Tablet 2.5 milliGRAM(s) Oral daily  chlordiazePOXIDE 50 milliGRAM(s) Oral every 8 hours  chlordiazePOXIDE   Oral   folic acid 1 milliGRAM(s) Oral daily  LORazepam   Injectable 2 milliGRAM(s) IV Push every 2 hours PRN  multivitamin 1 Tablet(s) Oral daily  potassium chloride   Powder 40 milliEquivalent(s) Oral once  potassium chloride   Powder 20 milliEquivalent(s) Oral once  thiamine 100 milliGRAM(s) Oral daily      LABS:                        13.9   4.46  )-----------( 154      ( 07 May 2020 20:39 )             40.8     05-08    137  |  100  |  6<L>  ----------------------------<  108<H>  3.5   |  26  |  0.64    Ca    8.2<L>      08 May 2020 10:16    TPro  6.0  /  Alb  3.8  /  TBili  1.0  /  DBili  x   /  AST  62<H>  /  ALT  38  /  AlkPhos  42  05-08      Urinalysis Basic - ( 07 May 2020 21:52 )    Color: Yellow / Appearance: Clear / S.010 / pH: x  Gluc: x / Ketone: Trace mg/dL  / Bili: Negative / Urobili: 0.2 E.U./dL   Blood: x / Protein: NEGATIVE mg/dL / Nitrite: NEGATIVE   Leuk Esterase: NEGATIVE / RBC: x / WBC x   Sq Epi: x / Non Sq Epi: x / Bacteria: x      CAPILLARY BLOOD GLUCOSE            Urinalysis Basic - ( 07 May 2020 21:52 )    Color: Yellow / Appearance: Clear / S.010 / pH: x  Gluc: x / Ketone: Trace mg/dL  / Bili: Negative / Urobili: 0.2 E.U./dL   Blood: x / Protein: NEGATIVE mg/dL / Nitrite: NEGATIVE   Leuk Esterase: NEGATIVE / RBC: x / WBC x   Sq Epi: x / Non Sq Epi: x / Bacteria: x

## 2020-05-10 LAB
ANION GAP SERPL CALC-SCNC: 11 MMOL/L — SIGNIFICANT CHANGE UP (ref 5–17)
BUN SERPL-MCNC: 7 MG/DL — SIGNIFICANT CHANGE UP (ref 7–23)
CALCIUM SERPL-MCNC: 8.8 MG/DL — SIGNIFICANT CHANGE UP (ref 8.4–10.5)
CHLORIDE SERPL-SCNC: 105 MMOL/L — SIGNIFICANT CHANGE UP (ref 96–108)
CO2 SERPL-SCNC: 24 MMOL/L — SIGNIFICANT CHANGE UP (ref 22–31)
CREAT SERPL-MCNC: 0.59 MG/DL — SIGNIFICANT CHANGE UP (ref 0.5–1.3)
GLUCOSE SERPL-MCNC: 95 MG/DL — SIGNIFICANT CHANGE UP (ref 70–99)
HCT VFR BLD CALC: 39.6 % — SIGNIFICANT CHANGE UP (ref 34.5–45)
HGB BLD-MCNC: 13 G/DL — SIGNIFICANT CHANGE UP (ref 11.5–15.5)
MAGNESIUM SERPL-MCNC: 2 MG/DL — SIGNIFICANT CHANGE UP (ref 1.6–2.6)
MCHC RBC-ENTMCNC: 32.8 GM/DL — SIGNIFICANT CHANGE UP (ref 32–36)
MCHC RBC-ENTMCNC: 32.8 PG — SIGNIFICANT CHANGE UP (ref 27–34)
MCV RBC AUTO: 100 FL — SIGNIFICANT CHANGE UP (ref 80–100)
NRBC # BLD: 0 /100 WBCS — SIGNIFICANT CHANGE UP (ref 0–0)
PHOSPHATE SERPL-MCNC: 3.6 MG/DL — SIGNIFICANT CHANGE UP (ref 2.5–4.5)
PLATELET # BLD AUTO: 155 K/UL — SIGNIFICANT CHANGE UP (ref 150–400)
POTASSIUM SERPL-MCNC: 4.2 MMOL/L — SIGNIFICANT CHANGE UP (ref 3.5–5.3)
POTASSIUM SERPL-SCNC: 4.2 MMOL/L — SIGNIFICANT CHANGE UP (ref 3.5–5.3)
RBC # BLD: 3.96 M/UL — SIGNIFICANT CHANGE UP (ref 3.8–5.2)
RBC # FLD: 14.7 % — HIGH (ref 10.3–14.5)
SODIUM SERPL-SCNC: 140 MMOL/L — SIGNIFICANT CHANGE UP (ref 135–145)
WBC # BLD: 5.13 K/UL — SIGNIFICANT CHANGE UP (ref 3.8–10.5)
WBC # FLD AUTO: 5.13 K/UL — SIGNIFICANT CHANGE UP (ref 3.8–10.5)

## 2020-05-10 PROCEDURE — 99233 SBSQ HOSP IP/OBS HIGH 50: CPT | Mod: GC

## 2020-05-10 RX ADMIN — Medication 50 MILLIGRAM(S): at 05:51

## 2020-05-10 RX ADMIN — Medication 1 TABLET(S): at 12:16

## 2020-05-10 RX ADMIN — Medication 50 MILLIGRAM(S): at 18:05

## 2020-05-10 RX ADMIN — Medication 2 MILLIGRAM(S): at 21:10

## 2020-05-10 RX ADMIN — Medication 100 MILLIGRAM(S): at 12:16

## 2020-05-10 RX ADMIN — AMLODIPINE BESYLATE 2.5 MILLIGRAM(S): 2.5 TABLET ORAL at 05:51

## 2020-05-10 RX ADMIN — Medication 1 MILLIGRAM(S): at 12:16

## 2020-05-10 NOTE — PROGRESS NOTE ADULT - PROBLEM SELECTOR PLAN 2
on Amlodipine 2.5 mg daily, normotensive on admission  - C/w amlodipine 2.5 mg daily  -Can likely be stopped once w/d better controlled

## 2020-05-10 NOTE — PROGRESS NOTE ADULT - SUBJECTIVE AND OBJECTIVE BOX
Patient examined at bedside with no acute events overnight. Pt feels that she is improving. No new complaints, still feels anxious. 12 pt ROS is negative.     Vital Signs Last 24 Hrs  T(C): 36.8 (10 May 2020 10:04), Max: 37 (09 May 2020 19:30)  T(F): 98.3 (10 May 2020 10:04), Max: 98.6 (09 May 2020 19:30)  HR: 121 (10 May 2020 10:04) (101 - 121)  BP: 110/80 (10 May 2020 10:04) (110/80 - 130/91)  BP(mean): --  RR: 16 (10 May 2020 10:04) (16 - 18)  SpO2: 96% (10 May 2020 10:04) (96% - 99%)    PHYSICAL EXAM:  GENERAL: NAD, sitting in bed  HEAD:  Atraumatic, Normocephalic  EYES: EOMI, PERRLA, conjunctiva and sclera clear  ENMT: Moist mucous membranes, Good dentition, No lesions  NECK: Supple, No JVD  NERVOUS SYSTEM:  Alert & Oriented X3, Good concentration; CIWA 4 (outstretched hands)  CHEST/LUNG: Clear to percussion bilaterally; No rales, rhonchi, wheezing, or rubs  HEART: normal s1 and s2 No murmurs, rubs, or gallops, tachycardic   ABDOMEN: Soft, Nontender, Nondistended; Bowel sounds present  EXTREMITIES:  2+ Peripheral Pulses, No clubbing, cyanosis, or edema  LYMPH: No lymphadenopathy noted  SKIN: No rashes or lesions    MEDICATIONS  (STANDING):  amLODIPine   Tablet 2.5 milliGRAM(s) Oral daily  chlordiazePOXIDE 50 milliGRAM(s) Oral every 12 hours  folic acid 1 milliGRAM(s) Oral daily  multivitamin 1 Tablet(s) Oral daily    MEDICATIONS  (PRN):  LORazepam   Injectable 2 milliGRAM(s) IV Push every 2 hours PRN CIWA-Ar score increase by 2 points and a total score of 7 or less                            13.0   5.13  )-----------( 155      ( 10 May 2020 06:50 )             39.6   05-10    140  |  105  |  7   ----------------------------<  95  4.2   |  24  |  0.59    Ca    8.8      10 May 2020 06:50  Phos  3.6     05-10  Mg     2.0     05-10

## 2020-05-10 NOTE — PROGRESS NOTE ADULT - PROBLEM SELECTOR PLAN 1
Patient presenting with palpitations, long-standing history of EtOH abuse (for past 8 years), multiple admissions for EtOH withdrawal in the past, no seizures/ICU/intubations/DTs; CIWA 4 today (for tremor)  - Librium 50 mg q12h, taper as tolerated  - Ativan 2 mg IVP PRN for CIWA >8  - CIWA Protocol, monitor q2-4hrs  - Folic Acid/MVI/Thiamine  - Monitor electrolytes and replace PRN

## 2020-05-11 LAB
ANION GAP SERPL CALC-SCNC: 10 MMOL/L — SIGNIFICANT CHANGE UP (ref 5–17)
BUN SERPL-MCNC: 12 MG/DL — SIGNIFICANT CHANGE UP (ref 7–23)
CALCIUM SERPL-MCNC: 8.5 MG/DL — SIGNIFICANT CHANGE UP (ref 8.4–10.5)
CHLORIDE SERPL-SCNC: 106 MMOL/L — SIGNIFICANT CHANGE UP (ref 96–108)
CO2 SERPL-SCNC: 23 MMOL/L — SIGNIFICANT CHANGE UP (ref 22–31)
CREAT SERPL-MCNC: 0.59 MG/DL — SIGNIFICANT CHANGE UP (ref 0.5–1.3)
GLUCOSE SERPL-MCNC: 89 MG/DL — SIGNIFICANT CHANGE UP (ref 70–99)
HCT VFR BLD CALC: 37 % — SIGNIFICANT CHANGE UP (ref 34.5–45)
HGB BLD-MCNC: 12.4 G/DL — SIGNIFICANT CHANGE UP (ref 11.5–15.5)
MAGNESIUM SERPL-MCNC: 2 MG/DL — SIGNIFICANT CHANGE UP (ref 1.6–2.6)
MCHC RBC-ENTMCNC: 33.4 PG — SIGNIFICANT CHANGE UP (ref 27–34)
MCHC RBC-ENTMCNC: 33.5 GM/DL — SIGNIFICANT CHANGE UP (ref 32–36)
MCV RBC AUTO: 99.7 FL — SIGNIFICANT CHANGE UP (ref 80–100)
NRBC # BLD: 0 /100 WBCS — SIGNIFICANT CHANGE UP (ref 0–0)
PLATELET # BLD AUTO: 152 K/UL — SIGNIFICANT CHANGE UP (ref 150–400)
POTASSIUM SERPL-MCNC: 4.1 MMOL/L — SIGNIFICANT CHANGE UP (ref 3.5–5.3)
POTASSIUM SERPL-SCNC: 4.1 MMOL/L — SIGNIFICANT CHANGE UP (ref 3.5–5.3)
RBC # BLD: 3.71 M/UL — LOW (ref 3.8–5.2)
RBC # FLD: 15.1 % — HIGH (ref 10.3–14.5)
SODIUM SERPL-SCNC: 139 MMOL/L — SIGNIFICANT CHANGE UP (ref 135–145)
WBC # BLD: 6.73 K/UL — SIGNIFICANT CHANGE UP (ref 3.8–10.5)
WBC # FLD AUTO: 6.73 K/UL — SIGNIFICANT CHANGE UP (ref 3.8–10.5)

## 2020-05-11 PROCEDURE — 99233 SBSQ HOSP IP/OBS HIGH 50: CPT | Mod: GC

## 2020-05-11 RX ORDER — DULOXETINE HYDROCHLORIDE 30 MG/1
20 CAPSULE, DELAYED RELEASE ORAL DAILY
Refills: 0 | Status: DISCONTINUED | OUTPATIENT
Start: 2020-05-11 | End: 2020-05-12

## 2020-05-11 RX ORDER — BENZOCAINE AND MENTHOL 5; 1 G/100ML; G/100ML
1 LIQUID ORAL THREE TIMES A DAY
Refills: 0 | Status: DISCONTINUED | OUTPATIENT
Start: 2020-05-11 | End: 2020-05-12

## 2020-05-11 RX ORDER — THIAMINE MONONITRATE (VIT B1) 100 MG
100 TABLET ORAL DAILY
Refills: 0 | Status: DISCONTINUED | OUTPATIENT
Start: 2020-05-11 | End: 2020-05-12

## 2020-05-11 RX ADMIN — Medication 1 TABLET(S): at 11:35

## 2020-05-11 RX ADMIN — AMLODIPINE BESYLATE 2.5 MILLIGRAM(S): 2.5 TABLET ORAL at 06:31

## 2020-05-11 RX ADMIN — Medication 100 MILLIGRAM(S): at 17:32

## 2020-05-11 RX ADMIN — Medication 25 MILLIGRAM(S): at 17:32

## 2020-05-11 RX ADMIN — Medication 1 MILLIGRAM(S): at 11:35

## 2020-05-11 RX ADMIN — Medication 50 MILLIGRAM(S): at 06:31

## 2020-05-11 NOTE — PROGRESS NOTE ADULT - PROBLEM SELECTOR PLAN 5
Patient endorses feeling uneasy and rapid heartbeat when abstaining from alcohol. Endorses wanting to drink when she feels her heart racing  - Possible 2/2 withdrawal; however, likely anxiety component  - Consider Hydroxyzine vs. Propanolol PRN for tachycardia Patient endorses feeling uneasy and rapid heartbeat when abstaining from alcohol. Endorses wanting to drink when she feels her heart racing  - Possible 2/2 withdrawal; however, likely anxiety component  - Start SNRI as inpatient for anxiety; failed SSRI in past per patient

## 2020-05-11 NOTE — PROGRESS NOTE ADULT - ASSESSMENT
44F with EtOH abuse, HTN presents with palpitations/anxiety after her last alcoholic drink this morning, admitted for management of ETOH withdrawal. Ms. Gonzalez is a 44F with EtOH abuse, HTN presents with palpitations/anxiety after her last alcoholic drink this morning, admitted for management of ETOH withdrawal.

## 2020-05-11 NOTE — PROGRESS NOTE ADULT - PROBLEM SELECTOR PLAN 6
F: s/p 2L IVF  E: Replete K<4, Mg<2  N: Reg  Code: Full  DVT ppx: low risk, no pharmacologic ppx needed (Padua score 0)  GI ppx: not needed  Dispo: [facility] F: s/p 2L IVF; LR PRN  E: Replete K<4, Mg<2  N: Reg  Code: Full  DVT ppx: low risk, no pharmacologic ppx needed (Padua score 0)  GI ppx: not needed  Dispo: [facility]

## 2020-05-11 NOTE — PROGRESS NOTE ADULT - PROBLEM SELECTOR PLAN 4
resolved. patient with good PO intake and 2 L IVF resolved. patient with good PO intake and 2 L IVF  - s/p LR bolus on 5/9

## 2020-05-11 NOTE — PROGRESS NOTE ADULT - PROBLEM SELECTOR PLAN 3
Multiple admissions for EtOH withdrawal in the past, no prior history of withdrawal seizures, DTs. Pt currently attends outpt rehab 3 days/week  -Discussed goals of quitting and abstaining Multiple admissions for EtOH withdrawal in the past, no prior history of withdrawal seizures, DTs. Pt currently attends outpt rehab 3 days/week  -Discussed goals of quitting and abstaining  - Patient in contact with addiction counselor via tele health  - F/u with PCP addiction specialist on 5/20  - Takes naltrexone at home for alcohol abuse disorder but states she has enough pills until PCP appointment

## 2020-05-11 NOTE — CHART NOTE - NSCHARTNOTEFT_GEN_A_CORE
Upon Nutritional Assessment by the Registered Dietitian your patient was determined to meet criteria / has evidence of the following diagnosis/diagnoses:          [X ]  Mild Protein Calorie Malnutrition        [ ]  Moderate Protein Calorie Malnutrition        [ ] Severe Protein Calorie Malnutrition        [ ] Unspecified Protein Calorie Malnutrition        [ ] Underweight / BMI <19        [ ] Morbid Obesity / BMI > 40    Suspect mild malnutrition AEB decreased PO intake for ~3 weeks and ~6% unintentional weight loss x~4months    Findings as based on:  •  Comprehensive nutrition assessment and consultation    Treatment:    The following diet has been recommended: see full note from 5/11    PROVIDER Section:     By signing this assessment you are acknowledging and agree with the diagnosis/diagnoses assigned by the Registered Dietitian    Comments:

## 2020-05-11 NOTE — PROGRESS NOTE ADULT - PROBLEM SELECTOR PLAN 1
Patient presenting with palpitations, long-standing history of EtOH abuse (for past 8 years), multiple admissions for EtOH withdrawal in the past, no seizures/ICU/intubations/DTs; CIWA 4 today (for tremor)  - Librium 50 mg q12h, taper as tolerated  - Ativan 2 mg IVP PRN for CIWA >8  - CIWA Protocol, monitor q2-4hrs  - Folic Acid/MVI/Thiamine  - Monitor electrolytes and replace PRN Patient presenting with palpitations, long-standing history of EtOH abuse (for past 8 years), multiple admissions for EtOH withdrawal in the past, no seizures/ICU/intubations/DTs; CIWA 4 today (for tremor)  - Librium 25 mg q12h, taper as tolerated  - Ativan 2 mg IVP PRN for CIWA >8  - CIWA Protocol, monitor q2-4hrs  - Folic Acid/MVI/Thiamine  - Monitor electrolytes and replace PRN  - Taper likely completed on 5/12 and will be discharged with follow up at [facility] PCP

## 2020-05-11 NOTE — PROGRESS NOTE ADULT - SUBJECTIVE AND OBJECTIVE BOX
Patient examined at bedside with no acute events overnight. Pt feels that she is improving. No new complaints, still feels anxious. 12 pt ROS is negative.     Vital Signs Last 24 Hrs  T(C): 36.8 (10 May 2020 10:04), Max: 37 (09 May 2020 19:30)  T(F): 98.3 (10 May 2020 10:04), Max: 98.6 (09 May 2020 19:30)  HR: 121 (10 May 2020 10:04) (101 - 121)  BP: 110/80 (10 May 2020 10:04) (110/80 - 130/91)  BP(mean): --  RR: 16 (10 May 2020 10:04) (16 - 18)  SpO2: 96% (10 May 2020 10:04) (96% - 99%)    PHYSICAL EXAM:  GENERAL: NAD, sitting in bed  HEAD:  Atraumatic, Normocephalic  EYES: EOMI, PERRLA, conjunctiva and sclera clear  ENMT: Moist mucous membranes, Good dentition, No lesions  NECK: Supple, No JVD  NERVOUS SYSTEM:  Alert & Oriented X3, Good concentration; CIWA 4 (outstretched hands)  CHEST/LUNG: Clear to percussion bilaterally; No rales, rhonchi, wheezing, or rubs  HEART: normal s1 and s2 No murmurs, rubs, or gallops, tachycardic   ABDOMEN: Soft, Nontender, Nondistended; Bowel sounds present  EXTREMITIES:  2+ Peripheral Pulses, No clubbing, cyanosis, or edema  LYMPH: No lymphadenopathy noted  SKIN: No rashes or lesions    MEDICATIONS  (STANDING):  amLODIPine   Tablet 2.5 milliGRAM(s) Oral daily  chlordiazePOXIDE 50 milliGRAM(s) Oral every 12 hours  folic acid 1 milliGRAM(s) Oral daily  multivitamin 1 Tablet(s) Oral daily    MEDICATIONS  (PRN):  LORazepam   Injectable 2 milliGRAM(s) IV Push every 2 hours PRN CIWA-Ar score increase by 2 points and a total score of 7 or less                            13.0   5.13  )-----------( 155      ( 10 May 2020 06:50 )             39.6   05-10    140  |  105  |  7   ----------------------------<  95  4.2   |  24  |  0.59    Ca    8.8      10 May 2020 06:50  Phos  3.6     05-10  Mg     2.0     05-10 Interval/Subjective:  NAEON. Less tremulous but still endorses anxiety. No SOB, denies any visual, tactile, auditory hallucinations    Vital Signs Last 24 Hrs  T(C): 36.7 (11 May 2020 09:19), Max: 37.1 (11 May 2020 06:25)  T(F): 98.1 (11 May 2020 09:19), Max: 98.8 (11 May 2020 06:25)  HR: 98 (11 May 2020 09:19) (95 - 123)  BP: 113/79 (11 May 2020 09:19) (107/75 - 120/80)  RR: 18 (11 May 2020 09:19) (18 - 18)  SpO2: 96% (11 May 2020 09:19) (96% - 97%)    PHYSICAL EXAM:  GENERAL: NAD, sitting in bed  HEAD:  Atraumatic, Normocephalic  EYES: EOMI, PERRLA, conjunctiva and sclera clear  ENMT: Moist mucous membranes, Good dentition, No lesions  NECK: Supple, No JVD  NERVOUS SYSTEM:  Alert & Oriented X3, Good concentration; CIWA 4 (outstretched hands)  CHEST/LUNG: CTAB; No rales, rhonchi, wheezing, or rubs  HEART: normal s1 and s2 No murmurs, rubs, or gallops, tachycardic   ABDOMEN: Soft, Nontender, Nondistended; Bowel sounds present  EXTREMITIES:  2+ Peripheral Pulses, No clubbing, cyanosis, or edema  LYMPH: No lymphadenopathy noted  SKIN: No rashes or lesions    MEDICATIONS  (STANDING):  amLODIPine   Tablet 2.5 milliGRAM(s) Oral daily  chlordiazePOXIDE 50 milliGRAM(s) Oral every 12 hours  folic acid 1 milliGRAM(s) Oral daily  multivitamin 1 Tablet(s) Oral daily    MEDICATIONS  (PRN):  LORazepam   Injectable 2 milliGRAM(s) IV Push every 2 hours PRN CIWA-Ar score increase by 2 points and a total score of 7 or less                 LABS:                         12.4   6.73  )-----------( 152      ( 11 May 2020 06:46 )             37.0     05-11    139  |  106  |  12  ----------------------------<  89  4.1   |  23  |  0.59    Ca    8.5      11 May 2020 06:46  Phos  3.6     05-10  Mg     2.0     05-11                    RADIOLOGY, EKG & ADDITIONAL TESTS: Reviewed.

## 2020-05-11 NOTE — PROGRESS NOTE ADULT - ATTENDING COMMENTS
Patient was seen and examined with the resident team today.  I agree with Dr. Dick's assessment and plan with the following exceptions/additions:     Briefly, this is a 43yo woman with a PMH of EtOH abuse/dependence and HTN who p/w palpitations and anxiety, self-medicated with EtOH, and found to have s/sx's c/f ETOH withdrawal.  When counselling patient about EtOH use, she discloses fear that comes with exercise-induced tachycardia, which then prompts her to drink.  In attempting to explain that tachycardia is a proper physiological response to exercise, she appears rigid to accept this concept.  She fixates on her HR and needs for speaking to her counselor and Psychiatrist, which she has been having trouble reaching.      -- c/w Librium taper  -- will ask SW to reach out to her outpt rehab facility   -- will re-attempt education on tachycardia and EtOH cessation  -- offered SSRI or SNRI but patient refused   -- will stop Norvasc   -- DVT PPx - ambulatory  -- Dispo - home after PM Librium on 5/12    Carmen De La Paz  [phone]

## 2020-05-11 NOTE — CHART NOTE - NSCHARTNOTEFT_GEN_A_CORE
Admitting Diagnosis:   Patient is a 44y old  Female who presents with a chief complaint of Alcohol withdrawal (11 May 2020 09:01)    Consult: Yes [   ]  No [ X ]    Reason for Initial Nutrition Assessment: LOS    PAST MEDICAL & SURGICAL HISTORY:  Hypertension  Alcohol abuse  S/P wrist surgery    Current Nutrition Order: Regular diet    PO Intake: Good (%) [ X ]  Fair (50-75%) [   ] Poor (<25%) [   ]    GI Issues: no N/V, solid BM this AM    Pain: none per pt    Skin Integrity: Aleksandr score 21    Labs:   05-11    139  |  106  |  12  ----------------------------<  89  4.1   |  23  |  0.59    Ca    8.5      11 May 2020 06:46  Phos  3.6     05-10  Mg     2.0     05-11    Nutritionally Pertinent Lab Values:  No nutritionally significant labs to report at this time    Medications:  MEDICATIONS  (STANDING):  chlordiazePOXIDE 25 milliGRAM(s) Oral every 12 hours  folic acid 1 milliGRAM(s) Oral daily  multivitamin 1 Tablet(s) Oral daily    MEDICATIONS  (PRN):  LORazepam   Injectable 2 milliGRAM(s) IV Push every 2 hours PRN CIWA-Ar score increase by 2 points and a total score of 7 or less    Admitted Anthropometrics:  Ht (5/7): 167.6cm, Wt (5/7): 61.2kg, IBW: 59.1kg+/-10%, %IBW: 103%, BMI: 21.8     Nutrition Focused Physical Exam: Completed [   ]  Unable to complete [ X visual  ]  No significant findings on visual exam (only upper body visualized)    Estimated energy needs:   ABW used to calculate energy needs due to pt's current body weight within % IBW.  Needs adjusted for suspected malnutrition. Aim for higher ends of kcal/protein ranges.    6167-2098 kcal (25-30 kcal/kg ABW)  61-73gm protein (1.0-1.2gm/kg ABW)  5238-6709 mL(25-30 mL/kg ABW)    Subjective: 44F with EtOH abuse, HTN presents with palpitations/anxiety after her last alcoholic drink this morning, admitted for management of ETOH withdrawal. Librium 25 mg q12h, taper as tolerated, taper likely to complete tomorrow per notes. Pt seen in room, resting in bed. Pt states appetite has been decreased for ~3 weeks prior to admission 2/2 alcohol intake. Appetite now improved, pt consuming >75% of meals. Pt states at baseline she has a good appetite and consumes a healthy diet (lots of fruit, vegetables, whole grains; minimal meat), denies food allergies. Pt states her UBW is ~143lb and she said she weighed this at the beginning of 2020. Pt states she has lost weight but is unsure of how much weight she has lost. Weight documented in chart is 134lb (5/7), indicating an unintentional weight loss of ~9lb x~4 months (~6%). Likely plan for d/c tomorrow per chart. Please see below for full nutritional recommendations- d/w team. RD to monitor and f/u per protocol.    Nutrition Diagnosis:  Suspect mild malnutrition RT inadequate energy intake AEB decreased PO intake for ~3 weeks and ~6% unintentional weight loss x~4months    Goal: Pt to consistently meet >75%EER PO with good tolerance, no further s/s malnutrition    Recommendations:  1. Recommend continue regular diet  >>consider adding Ensure Enlive if appetite decreases   2. Monitor labs (BMP, lytes); replete lytes prn  3. Obtain current weight and trend weekly weights for further assessment  4. Recommend continue MVI, folic acid; recommend add thiamine     Education: Continued adequate PO intake- pt appeared receptive    Risk Level: High [   ] Moderate [ X  ]  Low [   ]

## 2020-05-11 NOTE — PROGRESS NOTE ADULT - PROBLEM SELECTOR PLAN 2
on Amlodipine 2.5 mg daily, normotensive on admission  - C/w amlodipine 2.5 mg daily  -Can likely be stopped once w/d better controlled DC amlodipine 2.5 mg daily  - Consider Propanolol for tachycardia/palpitations PRN for discharge

## 2020-05-12 ENCOUNTER — TRANSCRIPTION ENCOUNTER (OUTPATIENT)
Age: 45
End: 2020-05-12

## 2020-05-12 VITALS
OXYGEN SATURATION: 98 % | TEMPERATURE: 97 F | HEART RATE: 93 BPM | DIASTOLIC BLOOD PRESSURE: 76 MMHG | RESPIRATION RATE: 18 BRPM | SYSTOLIC BLOOD PRESSURE: 112 MMHG

## 2020-05-12 PROCEDURE — 93005 ELECTROCARDIOGRAM TRACING: CPT

## 2020-05-12 PROCEDURE — 99239 HOSP IP/OBS DSCHRG MGMT >30: CPT | Mod: GC

## 2020-05-12 PROCEDURE — 80048 BASIC METABOLIC PNL TOTAL CA: CPT

## 2020-05-12 PROCEDURE — 96376 TX/PRO/DX INJ SAME DRUG ADON: CPT

## 2020-05-12 PROCEDURE — 87635 SARS-COV-2 COVID-19 AMP PRB: CPT

## 2020-05-12 PROCEDURE — 36415 COLL VENOUS BLD VENIPUNCTURE: CPT

## 2020-05-12 PROCEDURE — 83735 ASSAY OF MAGNESIUM: CPT

## 2020-05-12 PROCEDURE — 84100 ASSAY OF PHOSPHORUS: CPT

## 2020-05-12 PROCEDURE — 84702 CHORIONIC GONADOTROPIN TEST: CPT

## 2020-05-12 PROCEDURE — 96374 THER/PROPH/DIAG INJ IV PUSH: CPT

## 2020-05-12 PROCEDURE — 82010 KETONE BODYS QUAN: CPT

## 2020-05-12 PROCEDURE — 81003 URINALYSIS AUTO W/O SCOPE: CPT

## 2020-05-12 PROCEDURE — 80307 DRUG TEST PRSMV CHEM ANLYZR: CPT

## 2020-05-12 PROCEDURE — 96361 HYDRATE IV INFUSION ADD-ON: CPT

## 2020-05-12 PROCEDURE — 80053 COMPREHEN METABOLIC PANEL: CPT

## 2020-05-12 PROCEDURE — 99285 EMERGENCY DEPT VISIT HI MDM: CPT | Mod: 25

## 2020-05-12 PROCEDURE — 85027 COMPLETE CBC AUTOMATED: CPT

## 2020-05-12 RX ORDER — DULOXETINE HYDROCHLORIDE 30 MG/1
1 CAPSULE, DELAYED RELEASE ORAL
Qty: 30 | Refills: 1
Start: 2020-05-12 | End: 2020-07-10

## 2020-05-12 RX ORDER — THIAMINE MONONITRATE (VIT B1) 100 MG
1 TABLET ORAL
Qty: 30 | Refills: 0
Start: 2020-05-12 | End: 2020-06-10

## 2020-05-12 RX ADMIN — Medication 1 MILLIGRAM(S): at 12:55

## 2020-05-12 RX ADMIN — Medication 1 TABLET(S): at 12:55

## 2020-05-12 RX ADMIN — Medication 2 MILLIGRAM(S): at 01:55

## 2020-05-12 RX ADMIN — BENZOCAINE AND MENTHOL 1 LOZENGE: 5; 1 LIQUID ORAL at 00:05

## 2020-05-12 RX ADMIN — Medication 25 MILLIGRAM(S): at 05:40

## 2020-05-12 RX ADMIN — Medication 100 MILLIGRAM(S): at 12:55

## 2020-05-12 RX ADMIN — DULOXETINE HYDROCHLORIDE 20 MILLIGRAM(S): 30 CAPSULE, DELAYED RELEASE ORAL at 12:55

## 2020-05-12 NOTE — DISCHARGE NOTE NURSING/CASE MANAGEMENT/SOCIAL WORK - NSDCFUADDAPPT_GEN_ALL_CORE_FT
Please follow up with your outpatient psychiatrist regarding your naltrexone prescription and the titration of you duloxetine prescription within 4 weeks of discharge.    You have an appointment with Dr. Reyes on May 20th at [facility].

## 2020-05-12 NOTE — DISCHARGE NOTE PROVIDER - HOSPITAL COURSE
Ms. Gonzalez is a 44F with EtOH abuse, HTN presents with palpitations/anxiety after her last alcoholic drink this morning, admitted for management of ETOH withdrawal.        Problem List/Main Diagnoses:    #Alcohol Withdrawal    -  Patient presenting with palpitations, long-standing history of EtOH abuse (for past 8 years)    - Librium taper completed as an inpatient    - Thiamine and Multivitamin Prescription provided as outpatient    - Continue home naltrexone per outpatient psychiatrist        #HTN    - Amlodipine 2.5 mg daily as an inpatient    - Discontinued due to improvement of blood pressure, likely 2/2 withdrawal        #Anxiety    - SNRI (Cymbalta) started as inpatient    - F/U with PCP regarding management and titration        Inpatient treatment course:     On 5/8, Ms. Gonzalez was admitted for alcohol withdrawal. She was started on a librium taper with ativan pushes as needed. Her initial labs were significant for alcoholic ketoacidosis but quickly resolved with treatment. On 5/11, Cymbalta was started for anxiety. On 5/12, her librium taper was completed and she was stable for discharge with close follow up with her psychiatrist and primary care provider.         New medications:    - Cymbalta 20 mg daily    - Thiamine 1 mg daily         Labs to be followed outpatient:     - None    Exam to be followed outpatient:     - None Ms. Gonzalez is a 44F with EtOH abuse, HTN presents with palpitations/anxiety after her last alcoholic drink this morning, admitted for management of ETOH withdrawal.        Problem List/Main Diagnoses:    #Alcohol Withdrawal    -  Patient presenting with palpitations, long-standing history of EtOH abuse (for past 8 years)    - Librium taper completed as an inpatient    - Thiamine and Multivitamin Prescription provided as outpatient    - Continue home naltrexone per outpatient psychiatrist        #Heart Palpitations    - Your presenting symptoms of uneasiness and racing HR was monitored as an inpatient    - You were given medications for this condition     - You will follow up with your primary care physician        #HTN    - Amlodipine 2.5 mg daily as an inpatient    - Discontinued due to improvement of blood pressure, likely 2/2 withdrawal        #Anxiety    - SNRI (Cymbalta) started as inpatient    - F/U with PCP regarding management and titration        Inpatient treatment course:     On 5/8, Ms. Gonzalez was admitted for alcohol withdrawal. She was started on a librium taper with ativan pushes as needed. Her initial labs were significant for alcoholic ketoacidosis but quickly resolved with treatment. On 5/11, Cymbalta was started for anxiety. On 5/12, her librium taper was completed and she was stable for discharge with close follow up with her psychiatrist and primary care provider.         New medications:    - Cymbalta 20 mg daily    - Thiamine 1 mg daily         Labs to be followed outpatient:     - None    Exam to be followed outpatient:     - None

## 2020-05-12 NOTE — DISCHARGE NOTE PROVIDER - NSDCFUADDAPPT_GEN_ALL_CORE_FT
Please follow up with your outpatient psychiatrist regarding your naltrexone prescription and the titration of you duloxetine prescription within 4 weeks of discharge.    You have an appointment with Dr. Reyes on May 20th at [facility].

## 2020-05-12 NOTE — DISCHARGE NOTE PROVIDER - CARE PROVIDER_API CALL
João Reyes  [facility]  [address] , [place] [address]  Phone: [phone]  Fax: (   )    -  Scheduled Appointment: 05/20/2020

## 2020-05-12 NOTE — DISCHARGE NOTE PROVIDER - PROVIDER TOKENS
FREE:[LAST:[Eric],FIRST:[João],PHONE:[phone]],FAX:[(   [phone]    -],ADDRESS:[[facility]   [address] [place] [address]],SCHEDULEDAPPT:[05/20/2020]]

## 2020-05-12 NOTE — DISCHARGE NOTE NURSING/CASE MANAGEMENT/SOCIAL WORK - PATIENT PORTAL LINK FT
You can access the FollowMyHealth Patient Portal offered by [facility] by registering at the following website: [email or web address]. By joining [email or web address]’s FollowMyHealth portal, you will also be able to view your health information using other applications (apps) compatible with our system.

## 2020-05-12 NOTE — DISCHARGE NOTE PROVIDER - NSDCCPCAREPLAN_GEN_ALL_CORE_FT
PRINCIPAL DISCHARGE DIAGNOSIS  Diagnosis: Alcohol abuse  Assessment and Plan of Treatment: You were admitted for alcohol withdrawal. You were treated with a librium taper and your symptoms were monitored. It is important to follow up closely with your psychiatrist concerning your naltrexone prescirption, as well as your addiction counselor to prevent hospitalizations in the future. You will continue to take thiamine as an outpatient as you did in the hospital. Please reach out to the addiction hotlines if you have thoughts of hurting yourself or urges to drink. You are at risk of alcohol poisoning due to decreased tolerance from abstinence.      SECONDARY DISCHARGE DIAGNOSES  Diagnosis: Anxiety  Assessment and Plan of Treatment: Your tachycardia was likely related to anxiety and alcohol withdrawal. You were started on a medication called duloxetine. It is important to take this medication every day and monitor potential side effects such as decreased libido. You will not see the full effects of this medication until six weeks of therapy and it is important to take the medication until you can witness its full effectiveness. Please follow up with your psychiatrist or PCP concerning refills or changing the dose. PRINCIPAL DISCHARGE DIAGNOSIS  Diagnosis: Alcohol abuse  Assessment and Plan of Treatment: You were admitted for alcohol withdrawal. You were treated with a librium taper and your symptoms were monitored. It is important to follow up closely with your psychiatrist concerning your naltrexone prescirption, as well as your addiction counselor to prevent hospitalizations in the future. You will continue to take thiamine as an outpatient as you did in the hospital. Please reach out to the addiction hotlines if you have thoughts of hurting yourself or urges to drink. You are at risk of alcohol poisoning due to decreased tolerance from abstinence.      SECONDARY DISCHARGE DIAGNOSES  Diagnosis: Heart palpitations  Assessment and Plan of Treatment: Your presenting symptom of a racing heartbeat was monitored and treated as needed with anti anxiety medications. It is recommended to follow up with your PCP and have electrocardiograms at yearly checkups.    Diagnosis: Anxiety  Assessment and Plan of Treatment: Your tachycardia was likely related to anxiety and alcohol withdrawal. You were started on a medication called duloxetine. It is important to take this medication every day and monitor potential side effects such as decreased libido. You will not see the full effects of this medication until six weeks of therapy and it is important to take the medication until you can witness its full effectiveness. Please follow up with your psychiatrist or PCP concerning refills or changing the dose.

## 2020-05-12 NOTE — DISCHARGE NOTE PROVIDER - NSDCMRMEDTOKEN_GEN_ALL_CORE_FT
DULoxetine 20 mg oral delayed release capsule: 1 cap(s) orally once a day MDD:20 mg  folic acid 1 mg oral tablet: 1 tab(s) orally once a day  Multiple Vitamins oral tablet: 1 tab(s) orally once a day  thiamine 100 mg oral tablet: 1 tab(s) orally once a day MDD:100 mg

## 2020-05-14 DIAGNOSIS — F10.10 ALCOHOL ABUSE, UNCOMPLICATED: ICD-10-CM

## 2020-05-14 DIAGNOSIS — Z11.59 ENCOUNTER FOR SCREENING FOR OTHER VIRAL DISEASES: ICD-10-CM

## 2020-05-14 DIAGNOSIS — F41.9 ANXIETY DISORDER, UNSPECIFIED: ICD-10-CM

## 2020-05-14 DIAGNOSIS — Z88.8 ALLERGY STATUS TO OTHER DRUGS, MEDICAMENTS AND BIOLOGICAL SUBSTANCES STATUS: ICD-10-CM

## 2020-05-14 DIAGNOSIS — E87.2 ACIDOSIS: ICD-10-CM

## 2020-05-14 DIAGNOSIS — I10 ESSENTIAL (PRIMARY) HYPERTENSION: ICD-10-CM

## 2020-05-14 DIAGNOSIS — F10.239 ALCOHOL DEPENDENCE WITH WITHDRAWAL, UNSPECIFIED: ICD-10-CM
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
## 2020-05-14 DIAGNOSIS — E44.1 MILD PROTEIN-CALORIE MALNUTRITION: ICD-10-CM

## 2020-06-04 ENCOUNTER — INPATIENT (INPATIENT)
Facility: HOSPITAL | Age: 45
LOS: 0 days | Discharge: HOME | DRG: 897 | End: 2020-06-05
Attending: INTERNAL MEDICINE | Admitting: INTERNAL MEDICINE
Payer: MEDICAID

## 2020-06-04 VITALS
HEART RATE: 130 BPM | OXYGEN SATURATION: 99 % | SYSTOLIC BLOOD PRESSURE: 131 MMHG | TEMPERATURE: 98 F | RESPIRATION RATE: 18 BRPM | WEIGHT: 139.99 LBS | HEIGHT: 66 IN | DIASTOLIC BLOOD PRESSURE: 92 MMHG

## 2020-06-04 DIAGNOSIS — F19.10 OTHER PSYCHOACTIVE SUBSTANCE ABUSE, UNCOMPLICATED: ICD-10-CM

## 2020-06-04 DIAGNOSIS — F10.239 ALCOHOL DEPENDENCE WITH WITHDRAWAL, UNSPECIFIED: ICD-10-CM

## 2020-06-04 DIAGNOSIS — Z29.9 ENCOUNTER FOR PROPHYLACTIC MEASURES, UNSPECIFIED: ICD-10-CM

## 2020-06-04 DIAGNOSIS — Z98.890 OTHER SPECIFIED POSTPROCEDURAL STATES: Chronic | ICD-10-CM

## 2020-06-04 LAB
ALBUMIN SERPL ELPH-MCNC: 4.6 G/DL — SIGNIFICANT CHANGE UP (ref 3.3–5)
ALP SERPL-CCNC: 52 U/L — SIGNIFICANT CHANGE UP (ref 40–120)
ALT FLD-CCNC: 68 U/L — HIGH (ref 10–45)
ANION GAP SERPL CALC-SCNC: 16 MMOL/L — SIGNIFICANT CHANGE UP (ref 5–17)
AST SERPL-CCNC: 65 U/L — HIGH (ref 10–40)
BASOPHILS # BLD AUTO: 0.04 K/UL — SIGNIFICANT CHANGE UP (ref 0–0.2)
BASOPHILS NFR BLD AUTO: 0.4 % — SIGNIFICANT CHANGE UP (ref 0–2)
BILIRUB SERPL-MCNC: 1 MG/DL — SIGNIFICANT CHANGE UP (ref 0.2–1.2)
BUN SERPL-MCNC: 5 MG/DL — LOW (ref 7–23)
CALCIUM SERPL-MCNC: 9.7 MG/DL — SIGNIFICANT CHANGE UP (ref 8.4–10.5)
CHLORIDE SERPL-SCNC: 103 MMOL/L — SIGNIFICANT CHANGE UP (ref 96–108)
CO2 SERPL-SCNC: 24 MMOL/L — SIGNIFICANT CHANGE UP (ref 22–31)
CREAT SERPL-MCNC: 0.58 MG/DL — SIGNIFICANT CHANGE UP (ref 0.5–1.3)
EOSINOPHIL # BLD AUTO: 0.02 K/UL — SIGNIFICANT CHANGE UP (ref 0–0.5)
EOSINOPHIL NFR BLD AUTO: 0.2 % — SIGNIFICANT CHANGE UP (ref 0–6)
ETHANOL SERPL-MCNC: 31 MG/DL — HIGH (ref 0–10)
GLUCOSE SERPL-MCNC: 112 MG/DL — HIGH (ref 70–99)
HCG SERPL-ACNC: <0 MIU/ML — SIGNIFICANT CHANGE UP
HCT VFR BLD CALC: 45.8 % — HIGH (ref 34.5–45)
HGB BLD-MCNC: 15.4 G/DL — SIGNIFICANT CHANGE UP (ref 11.5–15.5)
IMM GRANULOCYTES NFR BLD AUTO: 0.5 % — SIGNIFICANT CHANGE UP (ref 0–1.5)
LIDOCAIN IGE QN: 58 U/L — SIGNIFICANT CHANGE UP (ref 7–60)
LYMPHOCYTES # BLD AUTO: 0.86 K/UL — LOW (ref 1–3.3)
LYMPHOCYTES # BLD AUTO: 9 % — LOW (ref 13–44)
MCHC RBC-ENTMCNC: 33.6 GM/DL — SIGNIFICANT CHANGE UP (ref 32–36)
MCHC RBC-ENTMCNC: 33.7 PG — SIGNIFICANT CHANGE UP (ref 27–34)
MCV RBC AUTO: 100.2 FL — HIGH (ref 80–100)
MONOCYTES # BLD AUTO: 1 K/UL — HIGH (ref 0–0.9)
MONOCYTES NFR BLD AUTO: 10.5 % — SIGNIFICANT CHANGE UP (ref 2–14)
NEUTROPHILS # BLD AUTO: 7.56 K/UL — HIGH (ref 1.8–7.4)
NEUTROPHILS NFR BLD AUTO: 79.4 % — HIGH (ref 43–77)
NRBC # BLD: 0 /100 WBCS — SIGNIFICANT CHANGE UP (ref 0–0)
PLATELET # BLD AUTO: 183 K/UL — SIGNIFICANT CHANGE UP (ref 150–400)
POTASSIUM SERPL-MCNC: 4.5 MMOL/L — SIGNIFICANT CHANGE UP (ref 3.5–5.3)
POTASSIUM SERPL-SCNC: 4.5 MMOL/L — SIGNIFICANT CHANGE UP (ref 3.5–5.3)
PROT SERPL-MCNC: 7.4 G/DL — SIGNIFICANT CHANGE UP (ref 6–8.3)
RBC # BLD: 4.57 M/UL — SIGNIFICANT CHANGE UP (ref 3.8–5.2)
RBC # FLD: 14 % — SIGNIFICANT CHANGE UP (ref 10.3–14.5)
SARS-COV-2 RNA SPEC QL NAA+PROBE: SIGNIFICANT CHANGE UP
SODIUM SERPL-SCNC: 143 MMOL/L — SIGNIFICANT CHANGE UP (ref 135–145)
TROPONIN T SERPL-MCNC: <0.01 NG/ML — SIGNIFICANT CHANGE UP (ref 0–0.01)
WBC # BLD: 9.53 K/UL — SIGNIFICANT CHANGE UP (ref 3.8–10.5)
WBC # FLD AUTO: 9.53 K/UL — SIGNIFICANT CHANGE UP (ref 3.8–10.5)

## 2020-06-04 PROCEDURE — 99222 1ST HOSP IP/OBS MODERATE 55: CPT | Mod: GC

## 2020-06-04 PROCEDURE — 93010 ELECTROCARDIOGRAM REPORT: CPT

## 2020-06-04 PROCEDURE — 99285 EMERGENCY DEPT VISIT HI MDM: CPT

## 2020-06-04 RX ORDER — DULOXETINE HYDROCHLORIDE 30 MG/1
20 CAPSULE, DELAYED RELEASE ORAL DAILY
Refills: 0 | Status: DISCONTINUED | OUTPATIENT
Start: 2020-06-04 | End: 2020-06-05

## 2020-06-04 RX ORDER — FOLIC ACID 0.8 MG
1 TABLET ORAL DAILY
Refills: 0 | Status: DISCONTINUED | OUTPATIENT
Start: 2020-06-04 | End: 2020-06-05

## 2020-06-04 RX ORDER — SODIUM CHLORIDE 9 MG/ML
1000 INJECTION INTRAMUSCULAR; INTRAVENOUS; SUBCUTANEOUS ONCE
Refills: 0 | Status: COMPLETED | OUTPATIENT
Start: 2020-06-04 | End: 2020-06-04

## 2020-06-04 RX ORDER — THIAMINE MONONITRATE (VIT B1) 100 MG
100 TABLET ORAL ONCE
Refills: 0 | Status: COMPLETED | OUTPATIENT
Start: 2020-06-04 | End: 2020-06-04

## 2020-06-04 RX ORDER — THIAMINE MONONITRATE (VIT B1) 100 MG
100 TABLET ORAL DAILY
Refills: 0 | Status: DISCONTINUED | OUTPATIENT
Start: 2020-06-04 | End: 2020-06-05

## 2020-06-04 RX ORDER — FOLIC ACID 0.8 MG
1 TABLET ORAL ONCE
Refills: 0 | Status: COMPLETED | OUTPATIENT
Start: 2020-06-04 | End: 2020-06-04

## 2020-06-04 RX ADMIN — SODIUM CHLORIDE 1000 MILLILITER(S): 9 INJECTION INTRAMUSCULAR; INTRAVENOUS; SUBCUTANEOUS at 17:45

## 2020-06-04 RX ADMIN — Medication 1 MILLIGRAM(S): at 19:51

## 2020-06-04 RX ADMIN — Medication 100 MILLIGRAM(S): at 19:51

## 2020-06-04 RX ADMIN — Medication 1 TABLET(S): at 19:51

## 2020-06-04 RX ADMIN — Medication 2 MILLIGRAM(S): at 16:13

## 2020-06-04 RX ADMIN — SODIUM CHLORIDE 2000 MILLILITER(S): 9 INJECTION INTRAMUSCULAR; INTRAVENOUS; SUBCUTANEOUS at 16:12

## 2020-06-04 RX ADMIN — DULOXETINE HYDROCHLORIDE 20 MILLIGRAM(S): 30 CAPSULE, DELAYED RELEASE ORAL at 23:11

## 2020-06-04 NOTE — ED PROVIDER NOTE - OBJECTIVE STATEMENT
43 yo EtOH abuse, HTN presents with palpitations/anxiety after her last alcoholic drink this morning, here for management of ETOH withdrawal. states was trying to improve sx on own with small sips of etoh 250ml over the last 24 hours, prior hx of withdrawal treatments and relapses. sob w palpitations but otherwise wo palpitations. 43 yo EtOH abuse, HTN presents with palpitations/anxiety after her last alcoholic drink this morning, here for management of ETOH withdrawal. states was trying to improve sx on own with small sips of etoh 250ml over the last 24 hours, prior hx of withdrawal treatments and relapses. sob w palpitations but otherwise wo palpitations. no hx of DTs, no other Denies associated  NVD, lightheaded, diaphoresis,  cough/rhinorrhea,  LE pain/swelling, focal weakness/numbness, recent travel/immobilization, abd pain, urinary complaints, f/c.

## 2020-06-04 NOTE — ED ADULT NURSE NOTE - OBJECTIVE STATEMENT
Patient A&Ox4, respirations even and unlabored, tremors observed, speaks in clear and full sentences, flushed skin observed, mildly diaphoretic. Patient reports Hx ETOH and alcohol withdrawal, states last alcoholic drink was "a cup of rum" at 10 AM, "It wasn't as much as I used to drink". Denies chest pain, SOB, LOC, recent falls. Left AC 18g IV placed, labs drawn and sent. Sinus tachycardia on monitor.

## 2020-06-04 NOTE — ED ADULT TRIAGE NOTE - AS HEIGHT TYPE
stated Referred To Mid-Level For Closure Text (Leave Blank If You Do Not Want): After obtaining clear surgical margins the patient was sent to a mid-level provider for surgical repair.  The patient understands they will receive post-surgical care and follow-up from the mid-level provider.

## 2020-06-04 NOTE — H&P ADULT - PROBLEM SELECTOR PLAN 2
Prescribed Naltrexone by Psychiatrist.    - SW in AM    #Anxiety:  Continue Cymbalta Prescribed Naltrexone by Psychiatrist.  Still has telehealth f/u with Psych at her outpatient rehab.  - Call in AM to secure close follow up    #Anxiety: Did not take Cymbalta prescribed on previous admission, preferring only herbal supplements.  After discussion patient is amenable to trial of SNRI.  - Continue Cymbalta

## 2020-06-04 NOTE — H&P ADULT - ASSESSMENT
44 year old female with HTN, alcohol abuse, multiple prior admits for withdrawal including one month ago, who presents with palpitations/anxiety after her last alcoholic drink this morning, admitted for EtOH withdrawal.

## 2020-06-04 NOTE — ED ADULT TRIAGE NOTE - OTHER COMPLAINTS
patient c/o palpitations in the setting of alcohol withdrawal--reports last drink at 10 AM--- +tongue fasiculations

## 2020-06-04 NOTE — H&P ADULT - PROBLEM SELECTOR PLAN 1
EtOH abuse x 8 years, multiple admissions for EtOH withdrawal in the past, no seizures/ICU/intubations/DTs; EtOH level on admission 31, last drink 10 am: s/p Ativan 2 mg IVP x1 and 1L NS in ED, CIWA 5 in ED for tremor and anxiety.  - Ativan 2 mg IVP PRN for CIWA >8  - CIWA Protocol, monitor 4hrs  - Folic Acid/MVI/Thiamine  - Monitor electrolytes and replace PRN EtOH abuse x 8 years, multiple admissions for EtOH withdrawal in the past, no seizures/ICU/intubations/DTs; EtOH level on admission 31, last drink 10 am: s/p Ativan 2 mg IVP x1 and 1L NS in ED, CIWA 5 in ED for tremor and anxiety.    - Ativan 2 mg IVP PRN for CIWA >8  - CIWA Protocol, with symptom triggered benzodiazepines  - Folic Acid/Thiamine  - Monitor electrolytes and replace PRN

## 2020-06-04 NOTE — H&P ADULT - NSHPLABSRESULTS_GEN_ALL_CORE
LABS:                         15.4   9.53  )-----------( 183      ( 04 Jun 2020 16:00 )             45.8     06-04    143  |  103  |  5<L>  ----------------------------<  112<H>  4.5   |  24  |  0.58    Ca    9.7      04 Jun 2020 16:00  Mg     1.6     06-04    TPro  7.4  /  Alb  4.6  /  TBili  1.0  /  DBili  x   /  AST  65<H>  /  ALT  68<H>  /  AlkPhos  52  06-04    CARDIAC MARKERS ( 04 Jun 2020 16:00 )  x     / <0.01 ng/mL / x     / x     / x          RADIOLOGY, EKG & ADDITIONAL TESTS: Reviewed.

## 2020-06-04 NOTE — ED ADULT NURSE REASSESSMENT NOTE - NS ED NURSE REASSESS COMMENT FT1
Patient is feeling better and is transferred to floor for further evaluation. Report was given to Mrs. Ashford.

## 2020-06-04 NOTE — H&P ADULT - HISTORY OF PRESENT ILLNESS
44 year old female with HTN, alcohol abuse, multiple prior admits for withdrawal including one month ago, who presents with palpitations/anxiety after her last alcoholic drink this morning. She was attempting to treat withdrawal symptoms at home with small amounts of etoh.  She normally drinks 1/2 bottle of vodka a day.  She has never had DTs or seizures in the past, never been intubated due to EtOH use    Labs unremarkable.  EKG sinus tachycardia.  CIWA 5 in ED.  She received 1L NS and Ativan 2 mg IVP. 44 year old female with HTN, alcohol abuse, multiple prior admits for withdrawal including one month ago, who presents with palpitations/anxiety after her last alcoholic drink this morning. She was attempting to treat withdrawal symptoms at home with small amounts of etoh.  Previously drinking 1/2 bottle of vodka a day.  She has never had DTs or seizures in the past, never been intubated due to EtOH use.    Labs unremarkable.  EKG sinus tachycardia.  CIWA 5 in ED.  She received 1L NS and Ativan 2 mg IVP.

## 2020-06-04 NOTE — ED PROVIDER NOTE - CLINICAL SUMMARY MEDICAL DECISION MAKING FREE TEXT BOX
Pt here for etoh withdrawal sx Pt here for etoh withdrawal sx, palpitations, noted tachycardia CIWA 5, will hydrate, administer benzos, MV/folic acid/thiamine, reassess.

## 2020-06-04 NOTE — H&P ADULT - NSHPPHYSICALEXAM_GEN_ALL_CORE
VITAL SIGNS:  T(C): 37.1 (06-04-20 @ 19:48), Max: 37.1 (06-04-20 @ 17:51)  T(F): 98.8 (06-04-20 @ 19:48), Max: 98.8 (06-04-20 @ 17:51)  HR: 115 (06-04-20 @ 19:48) (110 - 130)  BP: 120/92 (06-04-20 @ 19:48) (120/92 - 139/97)  BP(mean): --  RR: 16 (06-04-20 @ 19:48) (16 - 18)  SpO2: 97% (06-04-20 @ 19:48) (96% - 99%)  Wt(kg): --    PHYSICAL EXAM:    Constitutional: WDWN resting comfortably in bed; NAD  Head: NC/AT  Eyes: PERRL, EOMI, clear conjunctiva  ENT: no nasal discharge; uvula midline, no oropharyngeal erythema or exudates; MMM  Neck: supple; no JVD or thyromegaly  Respiratory: CTA B/L; no W/R/R, no retractions  Cardiac: +S1/S2; RRR; no M/R/G; PMI non-displaced  Gastrointestinal: soft, NT/ND; no rebound or guarding; +BSx4  Genitourinary: normal external genitalia  Back: spine midline, no bony tenderness or step-offs; no CVAT B/L  Extremities: WWP, no clubbing or cyanosis; no peripheral edema  Musculoskeletal: NROM x4; no joint swelling, tenderness or erythema  Vascular: 2+ radial, femoral, DP/PT pulses B/L  Dermatologic: skin warm, dry and intact; no rashes, wounds, or scars  Lymphatic: no submandibular or cervical LAD  Neurologic: AAOx3; CNII-XII grossly intact; no focal deficits  Psychiatric: affect and characteristics of appearance, verbalizations, behaviors are appropriate VITAL SIGNS:  T(C): 37.1 (06-04-20 @ 19:48), Max: 37.1 (06-04-20 @ 17:51)  T(F): 98.8 (06-04-20 @ 19:48), Max: 98.8 (06-04-20 @ 17:51)  HR: 115 (06-04-20 @ 19:48) (110 - 130)  BP: 120/92 (06-04-20 @ 19:48) (120/92 - 139/97)  BP(mean): --  RR: 16 (06-04-20 @ 19:48) (16 - 18)  SpO2: 97% (06-04-20 @ 19:48) (96% - 99%)  Wt(kg): --    PHYSICAL EXAM:    Constitutional: WDWN resting comfortably in bed; NAD  Head: NC/AT  Eyes: PERRL, EOMI, clear conjunctiva  ENT: no nasal discharge; no oropharyngeal erythema or exudates; MMM  Neck: supple; no JVD or thyromegaly  Respiratory: CTA B/L; no W/R/R, no retractions  Cardiac: +S1/S2; RRR; no M/R/G  Gastrointestinal: soft, NT/ND; no rebound or guarding; +BSx4  Back: spine midline, no bony tenderness or step-offs; no CVAT B/L  Extremities: WWP, no clubbing or cyanosis; no peripheral edema  Musculoskeletal: NROM x 4; no joint swelling, tenderness or erythema  Vascular: 2+ radial, DP/PT pulses B/L  Dermatologic: skin warm, dry and intact; no rashes, wounds, or scars  Lymphatic: no submandibular or cervical LAD  Neurologic: CIWA 2 for palpable tremor, AAOx3; CNII-XII grossly intact; no focal deficits  Psychiatric: affect and characteristics of appearance, verbalizations, behaviors are appropriate

## 2020-06-05 ENCOUNTER — TRANSCRIPTION ENCOUNTER (OUTPATIENT)
Age: 45
End: 2020-06-05

## 2020-06-05 VITALS
RESPIRATION RATE: 18 BRPM | HEART RATE: 91 BPM | OXYGEN SATURATION: 97 % | DIASTOLIC BLOOD PRESSURE: 94 MMHG | TEMPERATURE: 98 F | SYSTOLIC BLOOD PRESSURE: 138 MMHG

## 2020-06-05 LAB
ANION GAP SERPL CALC-SCNC: 10 MMOL/L — SIGNIFICANT CHANGE UP (ref 5–17)
BUN SERPL-MCNC: 7 MG/DL — SIGNIFICANT CHANGE UP (ref 7–23)
CALCIUM SERPL-MCNC: 8.6 MG/DL — SIGNIFICANT CHANGE UP (ref 8.4–10.5)
CHLORIDE SERPL-SCNC: 104 MMOL/L — SIGNIFICANT CHANGE UP (ref 96–108)
CO2 SERPL-SCNC: 26 MMOL/L — SIGNIFICANT CHANGE UP (ref 22–31)
CREAT SERPL-MCNC: 0.53 MG/DL — SIGNIFICANT CHANGE UP (ref 0.5–1.3)
GLUCOSE SERPL-MCNC: 94 MG/DL — SIGNIFICANT CHANGE UP (ref 70–99)
POTASSIUM SERPL-MCNC: 3.5 MMOL/L — SIGNIFICANT CHANGE UP (ref 3.5–5.3)
POTASSIUM SERPL-SCNC: 3.5 MMOL/L — SIGNIFICANT CHANGE UP (ref 3.5–5.3)
SODIUM SERPL-SCNC: 140 MMOL/L — SIGNIFICANT CHANGE UP (ref 135–145)

## 2020-06-05 PROCEDURE — 80048 BASIC METABOLIC PNL TOTAL CA: CPT

## 2020-06-05 PROCEDURE — 85025 COMPLETE CBC W/AUTO DIFF WBC: CPT

## 2020-06-05 PROCEDURE — 93005 ELECTROCARDIOGRAM TRACING: CPT

## 2020-06-05 PROCEDURE — 80307 DRUG TEST PRSMV CHEM ANLYZR: CPT

## 2020-06-05 PROCEDURE — 99285 EMERGENCY DEPT VISIT HI MDM: CPT | Mod: 25

## 2020-06-05 PROCEDURE — 96374 THER/PROPH/DIAG INJ IV PUSH: CPT

## 2020-06-05 PROCEDURE — 84484 ASSAY OF TROPONIN QUANT: CPT

## 2020-06-05 PROCEDURE — 83735 ASSAY OF MAGNESIUM: CPT

## 2020-06-05 PROCEDURE — 80053 COMPREHEN METABOLIC PANEL: CPT

## 2020-06-05 PROCEDURE — 36415 COLL VENOUS BLD VENIPUNCTURE: CPT

## 2020-06-05 PROCEDURE — 87635 SARS-COV-2 COVID-19 AMP PRB: CPT

## 2020-06-05 PROCEDURE — 96361 HYDRATE IV INFUSION ADD-ON: CPT

## 2020-06-05 PROCEDURE — 84702 CHORIONIC GONADOTROPIN TEST: CPT

## 2020-06-05 PROCEDURE — 99238 HOSP IP/OBS DSCHRG MGMT 30/<: CPT | Mod: GC

## 2020-06-05 PROCEDURE — 83690 ASSAY OF LIPASE: CPT

## 2020-06-05 RX ORDER — POTASSIUM CHLORIDE 20 MEQ
40 PACKET (EA) ORAL ONCE
Refills: 0 | Status: COMPLETED | OUTPATIENT
Start: 2020-06-05 | End: 2020-06-05

## 2020-06-05 RX ADMIN — Medication 40 MILLIEQUIVALENT(S): at 12:06

## 2020-06-05 RX ADMIN — Medication 1 MILLIGRAM(S): at 12:06

## 2020-06-05 RX ADMIN — Medication 100 MILLIGRAM(S): at 12:06

## 2020-06-05 NOTE — SBIRT NOTE ADULT - NSSBIRTBRIEFINTDET_GEN_A_CORE
Patient reported relapsing on alcohol a few weeks ago. Patient has been drinking half a bottle of vodka daily. Patient is currently in outpatient treatment at [facility]. Patient was going 3x week. Patient stated that since COVID, she has been talking to her counselor on the telephone but [facility] plans on reopening possibly next week. SW offered her additional resources; patient declined.

## 2020-06-05 NOTE — PROGRESS NOTE ADULT - SUBJECTIVE AND OBJECTIVE BOX
OVERNIGHT EVENTS:  No acute events overnight.    SUBJECTIVE / INTERVAL HPI: Patient seen and examined at bedside. Denies any complaints of chest pain, SOB, abdominal pain, N/V/D.    VITAL SIGNS:  Vital Signs Last 24 Hrs  T(C): 36.9 (05 Jun 2020 12:17), Max: 37.2 (04 Jun 2020 21:09)  T(F): 98.5 (05 Jun 2020 12:17), Max: 99 (04 Jun 2020 21:09)  HR: 91 (05 Jun 2020 12:17) (91 - 130)  BP: 138/94 (05 Jun 2020 12:17) (120/92 - 142/90)  BP(mean): --  RR: 18 (05 Jun 2020 12:17) (16 - 19)  SpO2: 97% (05 Jun 2020 12:17) (96% - 100%)    PHYSICAL EXAM:    General: Lying comfortably in bed, NAD  HEENT: NC/AT, PERRL, anicteric sclera; MMM  Neck: supple  Cardiovascular: +S1/S2, RRR  Respiratory: CTA B/L, no W/R/R  Gastrointestinal: soft, NT/ND, +BS  Extremities: WWP, no edema or cyanosis  Vascular: 2+ radial, DP/PT pulses B/L  Neurological: AAOx3, no focal deficits, CIWA 4 for anxiety and tremor    MEDICATIONS:  MEDICATIONS  (STANDING):  DULoxetine 20 milliGRAM(s) Oral daily  folic acid 1 milliGRAM(s) Oral daily  thiamine 100 milliGRAM(s) Oral daily    MEDICATIONS  (PRN):  LORazepam   Injectable 2 milliGRAM(s) IV Push every 2 hours PRN CIWA-Ar score increase by 2 points and a total score of 7 or less      ALLERGIES:  Allergies    predniSONE (Rash)  Tamiflu (Other)    Intolerances        LABS:                        15.4   9.53  )-----------( 183      ( 04 Jun 2020 16:00 )             45.8     06-05    140  |  104  |  7   ----------------------------<  94  3.5   |  26  |  0.53    Ca    8.6      05 Jun 2020 06:53  Mg     1.6     06-04    TPro  7.4  /  Alb  4.6  /  TBili  1.0  /  DBili  x   /  AST  65<H>  /  ALT  68<H>  /  AlkPhos  52  06-04        CAPILLARY BLOOD GLUCOSE          RADIOLOGY & ADDITIONAL TESTS: Reviewed.

## 2020-06-05 NOTE — DISCHARGE NOTE PROVIDER - NSDCCPCAREPLAN_GEN_ALL_CORE_FT
PRINCIPAL DISCHARGE DIAGNOSIS  Diagnosis: Anxiety, generalized  Assessment and Plan of Treatment: You reported palpitations and anxiety when you were admitted to the hospital. After you are discharged from the hospital, you should continue to take your duloxetine, which will take 2-3 weeks to take effect. In the mean time, please follow up with your psychiatrist for further management and avoid using alcohol to treat your anxiety, as this can have dangerous interactions with your duloxetine.      SECONDARY DISCHARGE DIAGNOSES  Diagnosis: Alcohol abuse  Assessment and Plan of Treatment:

## 2020-06-05 NOTE — DISCHARGE NOTE PROVIDER - NSDCMRMEDTOKEN_GEN_ALL_CORE_FT
DULoxetine 20 mg oral delayed release capsule: 1 cap(s) orally once a day MDD:20 mg  folic acid 1 mg oral tablet: 1 tab(s) orally once a day  Multiple Vitamins oral tablet: 1 tab(s) orally once a day  thiamine 100 mg oral tablet: 1 tab(s) orally once a day MDD:100 mg  thiamine 100 mg oral tablet: 1 tab(s) orally once a day MDD:100 mg

## 2020-06-05 NOTE — PROGRESS NOTE ADULT - PROBLEM SELECTOR PLAN 1
EtOH abuse x 8 years, multiple admissions for EtOH withdrawal in the past, no seizures/ICU/intubations/DTs; EtOH level on admission 31, last drink 10 am: s/p Ativan 2 mg IVP x1 and 1L NS in ED, CIWA 5 in ED for tremor and anxiety.    - Ativan 2 mg IVP PRN for CIWA >8  - CIWA Protocol, with symptom triggered benzodiazepines  - Folic Acid/Thiamine  - Monitor electrolytes and replace PRN

## 2020-06-05 NOTE — DISCHARGE NOTE NURSING/CASE MANAGEMENT/SOCIAL WORK - PATIENT PORTAL LINK FT
You can access the FollowMyHealth Patient Portal offered by [facility] by registering at the following website: [email or web address]. By joining [employer]’s FollowMyHealth portal, you will also be able to view your health information using other applications (apps) compatible with our system.

## 2020-06-05 NOTE — DISCHARGE NOTE PROVIDER - NSDCFUADDAPPT_GEN_ALL_CORE_FT
Please follow up with your outpatient psychiatrist and primary care provider after you are discharged from the hospital.

## 2020-06-05 NOTE — PROGRESS NOTE ADULT - ATTENDING COMMENTS
Encouraged outpatient follow up with her psychiatrist for anxiety treatment-fixated with HR monitoring-  Advised etoh cessation, no e/o withdrawal since admission, off librium and doing well  Stable for discharge

## 2020-06-05 NOTE — PROGRESS NOTE ADULT - PROBLEM SELECTOR PLAN 2
Prescribed Naltrexone by Psychiatrist.  Still has telehealth f/u with Psych at her outpatient rehab.  - Outpatient follow up    #Anxiety: Did not take Cymbalta prescribed on previous admission, preferring only herbal supplements.  After discussion patient is amenable to trial of SNRI.  - Continue Cymbalta

## 2020-06-05 NOTE — DISCHARGE NOTE PROVIDER - HOSPITAL COURSE
44 year old female with HTN, alcohol abuse, multiple prior admits for withdrawal including one month ago, who presenting on 6/4 with palpitations/anxiety.        #Anxiety    - Pt complains of heart palpitations with tachycardia to 100s at home    - Self-medicating with alcohol (last drink at 10am on 6/4)    - Previously prescribed duloxetine, which pt was not taking due to misunderstanding about onset of effect    - Educated on continuing duloxetine and stopping alcohol use        #Alcohol abuse    - Pt with CIWA 3-4 due to tremor and anxiety    - Received ativan 2mg IVP in ED    - Did not require ativan or librium during admission    - Counseled on alcohol cessation    - Recommended follow up with outpatient psychiatrist        New medications at discharge: None

## 2020-06-09 DIAGNOSIS — I10 ESSENTIAL (PRIMARY) HYPERTENSION: ICD-10-CM

## 2020-06-09 DIAGNOSIS — F19.10 OTHER PSYCHOACTIVE SUBSTANCE ABUSE, UNCOMPLICATED: ICD-10-CM

## 2020-06-09 DIAGNOSIS — F10.239 ALCOHOL DEPENDENCE WITH WITHDRAWAL, UNSPECIFIED: ICD-10-CM

## 2020-06-09 DIAGNOSIS — F41.1 GENERALIZED ANXIETY DISORDER: ICD-10-CM
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
## 2020-06-15 PROCEDURE — G9005: CPT

## 2020-07-17 NOTE — ED ADULT TRIAGE NOTE - AS TEMP SITE
oral Minoxidil Counseling: Minoxidil is a topical medication which can increase blood flow where it is applied. It is uncertain how this medication increases hair growth. Side effects are uncommon and include stinging and allergic reactions.

## 2020-07-21 NOTE — ED PROVIDER NOTE - NS ED MD DISPO ADMIT [facility] PALLIATIVE CARE
Detail Level: Zone
Quality 130: Documentation Of Current Medications In The Medical Record: Current Medications Documented
Quality 110: Preventive Care And Screening: Influenza Immunization: Influenza immunization was not ordered or administered, reason not given
NONE

## 2020-08-03 NOTE — ED ADULT TRIAGE NOTE - BANDS:
Functional diarrhea  All her diarrhea is completely resolved  She is now having 1-2 formed bowel movements per day  GERD (gastroesophageal reflux disease)  Reflux imaging under very good control  She has managed to decrease her pantoprazole to 40 mg once a day  Continue famotidine 20 up to 40 mg about 1-2 hours prior to bed  Lifestyle modifications for gastroesophageal reflux disease were discussed and include limiting fried and fatty foods, mints, chocolates, carbonated and caffeinated beverages , and alcohol, etc   Avoid lying down for 2-3 hours after meals  If you have nighttime symptoms consider raising the head of the bed up on 4-6 inch blocks  Pillows typically are not useful  If you are overweight, weight loss will be helpful  Lower abdominal pain  She has had some lower abdominal cramping preceding a bowel movement relieved with a bowel movement  This is likely related to irritable bowel  She will keep an eye on her food choices  She is not using fiber at this time  I will send a prescription for hyoscyamine 0 125 mg sublingually or orally every 6 hours as needed for abdominal cramping  She does not need to take this on a daily basis  This may lead to dry eyes, dry mouth, and make her feel tired  Dyspepsia  Improved to some degree although still has some eructation with eating  Gastric emptying scan as noted below  4 hour emptying was normal,  Emptying in the 1st 3 hours was somewhat delayed  However overall normal emptying after 4 hours  I did suggested she try to eat smaller more frequent meals 
Allergy;

## 2020-08-25 NOTE — ED PROVIDER NOTE - NS ED MD DISPO DIVISION
Discharge instructions reviewed with patient/responsible adult and understanding verbalized. Discharge instructions signed and copies given. Patient discharged home with belongings.
MD at bedside speaking to patient
Obstructive Sleep Apnea (KANCHAN) Screening     Patient:  Gurinder Patterson    YOB: 1960      Medical Record #:  [id number]                     Date:  8/19/2020     1. Are you a loud and/or regular snorer? []  Yes       [x] No    2. Have you been observed to gasp or stop breathing during sleep? []  Yes       [x] No    3. Do you feel tired or groggy upon awakening or do you awaken with a headache?           []  Yes       [] No    4. Are you often tired or fatigued during the wake time hours? []  Yes       [] No    5. Do you fall asleep sitting, reading, watching TV or driving? []  Yes       [] No    6. Do you often have problems with memory or concentration? []  Yes       [] No    **If patient's score is ? 3 they are considered high risk for KANCHAN. An Anesthesia provider will evaluate the patient and develop a plan of care the day of surgery. Note:  If the patient's BMI is more than 35 kg m¯² , has neck circumference > 40 cm, and/or high blood pressure the risk is greater (© American Sleep Apnea Association, 2006).
Pre and post operative expectations and routines explained to patient, verbalized understanding.
Preoperative Screening for Elective Surgery/Invasive Procedures While COVID-19 present in the community     Have you tested positive or have been told to self-isolate for COVID-19 like symptoms within the past 28 days? No   Do you currently have any of the following symptoms? No  o Fever >100.0 F or 99.9 F in immunocompromised patients? No  o New onset cough, shortness of breath or difficulty breathing? No  o New onset sore throat, myalgia (muscle aches and pains), headache, loss of taste/smell or diarrhea? No   Have you had a potential exposure to COVID-19 within the past 14 days by:  o Close contact with a confirmed case? No  o Close contact with a healthcare worker,  or essential infrastructure worker (grocery store, [employer], gas station, public utilities or transportation)? No  o Do you reside in a congregate setting such as; skilled nursing facility, adult home, correctional facility, homeless shelter or other institutional setting? No  o Have you had recent travel to a known COVID-19 hotspot? No    Indicate if the patient has a positive screen by answering yes to one or more of the above questions. Patients who test positive or screen positive prior to surgery or on the day of surgery should be evaluated in conjunction with the surgeon/proceduralist/anesthesiologist to determine the urgency of the procedure.
[facility]

## 2021-01-08 NOTE — PATIENT PROFILE ADULT - NSPROPATIENTLACTATING_GEN_A_NUR
approx 1600, pt was working out, lost his balance and hit his head on his bed. Denies loc. Pt acting approp. Denies pain. Bruising noted to forehead. Pt with steady gait.   
no

## 2021-05-10 NOTE — PROGRESS NOTE ADULT - PROBLEM SELECTOR PROBLEM 1
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 which aggravates shoulder and knee pain  Objective: See treatment diary below      Assessment: Tolerated treatment well  Patient demonstrated fatigue post treatment, exhibited good technique with therapeutic exercises and would benefit from continued PT  Good mitch to progression of TE as noted  Plan: Continue per plan of care        Precautions: Lactose allergy      Manuals 4-21 4-26 5/10          R Patellar mobs NV [facility] RK          Manual h/s str NV jp RK          Manual quad/hip flex str NV            IASTM R ITB  [facility] Rk          Neuro Re-Ed                                                                                                        Ther Ex 4-21 4-26 5/10          SLR w/ hip ER x10 2# 2x15 2# 2x15          S/l SLR hip abd x10 2# 2x15 2# 2x15          Clamshells BlueTB 3s x20 BlueTB 3s x30 Blue TB 3sx30          Hip add ball sqz 5s 20 5s x30 5"x30          H/s str grn strap 20s x3  20sx3          Prone hip flex/quad str grn strap 20s x3  20sx3          Quad sets w/ towel  5s x30 5sx30          Rec bike  x8' x9'          ITB stretch strap   20sx3          Ball squats   3s 2x10          LP   125#  3x10          sidestepping   GTB  25'x4                       There act                                       Gait Training                                       Modalities Alcohol withdrawal, uncomplicated

## 2021-06-21 NOTE — PATIENT PROFILE ADULT - PUBLIC BENEFITS - CHOOSE ALL APPLY
June 21, 2021       Raina Solis MD  [address]  [address]  [facility] [address]  Via Fax: [phone]      Patient: Savanna Rosado   YOB: 2005   Date of Visit: 6/21/2021       Dear Dr. Solis:    Thank you for referring Savanna Rosado to me for evaluation. Below are my notes for this visit with her.    If you have questions, please do not hesitate to call me. I look forward to following your patient along with you.      Sincerely,        Ever Willis MD        CC: No Recipients  Ever Wlilis MD  6/21/2021  1:50 PM  Signed  REASON FOR CONSULTATION/VISIT:  Chief Complaint   Patient presents with   • Office Visit     Post op breast reduction      REFERRING PHYSICIAN:  Raina Solis    HISTORY OF PRESENT ILLNESS:  Savanna Rosado is a 16 year old female who presents with macromastia s/p bilateral breast reduction on 6/16/21.  She has maintained her surgical dressings.  She reports that she has some mild persistent discomfort.  There are no reports of fevers or chills.      ALLERGIES:  ALLERGIES:   Allergen Reactions   • Latex HIVES   • Zyrte Childrens Allergy HIVES     CURRENT MEDICATIONS:  Current Outpatient Medications   Medication Sig Dispense Refill   • ibuprofen (MOTRIN) 600 MG tablet Take 1 tablet by mouth every 6 hours as needed for Pain. 20 tablet 0   • polyethylene glycol (MiraLax) 17 g packet Take 17 g by mouth daily. Stir and dissolve powder in any 4 to 8 ounces of beverage, then drink.     • DULoxetine (CYMBALTA) 60 MG capsule Take 60 mg by mouth nightly.      • DULoxetine (CYMBALTA) 60 MG capsule Take 60 mg by mouth daily.       No current facility-administered medications for this visit.     PAST MEDICAL AND SURGICAL HISTORY:  Past Medical History:   Diagnosis Date   • Chronic pain     neck and back   • Depression    • Motion sickness      Past Surgical History:   Procedure Laterality Date   • Adenoidectomy     • Tonsillectomy     • Tympanostomy tube placement       FAMILY AND  SOCIAL HISTORY:  Family History   Problem Relation Age of Onset   • Hypertension Father      Social History     Tobacco Use   • Smoking status: Never Smoker   • Smokeless tobacco: Never Used   Substance Use Topics   • Alcohol use: Never   • Drug use: Never     REVIEW OF SYSTEMS:  Review of Systems   Constitutional: Negative for activity change, appetite change, chills, fatigue and fever.   HENT: Negative for congestion, ear discharge, facial swelling, postnasal drip, rhinorrhea, sinus pressure, sore throat and trouble swallowing.    Eyes: Negative for photophobia, discharge, redness and visual disturbance.   Respiratory: Negative for apnea, cough and shortness of breath.    Cardiovascular: Negative for chest pain and leg swelling.   Gastrointestinal: Negative for abdominal distention, abdominal pain, diarrhea and vomiting.   Endocrine: Negative for cold intolerance and heat intolerance.   Genitourinary: Negative.    Musculoskeletal: Negative for joint swelling.   Skin: Negative for color change, pallor and rash.   Neurological: Negative for dizziness, facial asymmetry, speech difficulty, light-headedness, numbness and headaches.   Hematological: Negative for adenopathy.   Psychiatric/Behavioral: Negative for confusion.       PHYSICAL EXAMINATION:  Visit Vitals  /58 (BP Location: LUE - Left upper extremity, Patient Position: Sitting, Cuff Size: Regular)   Pulse 99   Ht 5' 3.39\" (1.61 m)   Wt 59.2 kg (130 lb 8.2 oz)   LMP 05/31/2021   BMI 22.84 kg/m²     Physical Exam  Constitutional:       Appearance: She is well-developed.   HENT:      Head: Normocephalic and atraumatic.   Eyes:      Conjunctiva/sclera: Conjunctivae normal.      Pupils: Pupils are equal, round, and reactive to light.   Cardiovascular:      Comments: Extremities warm and well perfused.  Pulmonary:      Effort: Pulmonary effort is normal. No respiratory distress.   Musculoskeletal:         General: No deformity. Normal range of motion.       Cervical back: Normal range of motion and neck supple.   Skin:     General: Skin is warm and dry.      Comments:  Incisions clean, dry, and intact. Skin glue and tape in place.  Nipples viable bilaterally.    Neurological:      Mental Status: She is alert.      Cranial Nerves: No cranial nerve deficit.   Psychiatric:         Behavior: Behavior normal.     BLOODWORK:  No pertinent bloodwork    IMAGING:  No pertinent imaging    IMPRESSION AND PLAN:  Savanna Rosado is a 16 year old female who presents with macromastia s/p breast reduction on 6/16/21. Pathology has yet to result. She is recovering as expected.  She may shower and allow soapy water to run over the incisions. Pat them dry.  I informed the patient that the glue and tape will fall of over time. She may slowly resume normal activities, but should continue to refrain from heavy lifting, arms above the head, or working out.  I advised that she slowly wean her pain medication.  She will wear a sport's bra without an under wire.  I asked that they return to clinic in 1 month.  Thank you for the opportunity to participate in this patient's care.     Macromastia  Orders Placed This Encounter   • DULoxetine (CYMBALTA) 60 MG capsule     Ever Willis MD, FAAP  6/21/2021                other

## 2021-06-28 NOTE — ED ADULT NURSE NOTE - NS ED NURSE RECORD ANOTHER VITAL SIGN
Progress Notes by Raheem Ríos MD at 10/22/2019  2:00 PM     Author: Raheem Ríos MD Service: -- Author Type: Physician    Filed: 10/24/2019  8:47 PM Encounter Date: 10/22/2019 Status: Signed    : Raheem Ríos MD (Physician)       MALE PREVENTATIVE EXAM    Assessment and Plan:       1. Routine general medical examination at a health care facility  - Hepatitis C Antibody (Anti-HCV)  - HIV Antigen/Antibody Screening [place]  He has had his fasting lab which was already resulted and we reviewed the results and he asked questions which was answered.We discussed different ways to help.  2. Tetanus-diphtheria (Td) vaccination  - Td, Preservative Free (green label)  He did get his tetanus updated.  Next follow up:  No follow-ups on file.    Immunization Review  Adult Imm Review: Due today, orders placed  he does not smoke.  I discussed the following with the patient:   Adult Healthy Living: Importance of regular exercise  Healthy nutrition  Getting adequate sleep  Stress management    Subjective:   Chief Complaint: Toni Mcclelland is an 52 y.o. male here for a preventative health visit.     HPI:  Comes in today for physical exam. Has no concerns.But he was bitten by a deer tick but was evaluated yesterday.Waiting for the lab results.He has no symptoms associated with Lyme and noted that the bite is no longer painful or itchy.  He is active and doing well.    Healthy Habits  Are you taking a daily aspirin? No  Do you typically exercising at least 40 min, 3-4 times per week?  Yes  Do you usually eat at least 4 servings of fruit and vegetables a day, include whole grains and fiber and avoid regularly eating high fat foods? Yes- tries to   Have you had an eye exam in the past two years? Yes  Do you see a dentist twice per year? NO  Do you have any concerns regarding sleep? No    Safety Screen  If you own firearms, are they secured in a locked gun cabinet or with trigger  "locks? The patient does not own any firearms  Do you feel you are safe where you are living?: Yes (10/22/2019  2:01 PM)  Do you feel you are safe in your relationship(s)?: Yes (10/22/2019  2:01 PM)      Review of Systems:   Constitutional:Denied any fatigue no fevers no chills.  Has good appetite.  HEENT: Does not have any neck pain.  No difficulty swallowing denies having any postnasal drips.    Respiratory: There is no cough.  No chest wall pain.  Cardiovascular: Denied chest pain shortness of breath or palpitations.  There is no notable lower extremity swelling.    Gastrointestinal: Denies nausea vomiting.  No abdominal pain no diarrhea or constipation.  Endocrine:Has no sensitivity to cold or heat.  Denied undue thirst.   Genitourinary:Has no urinary symptoms, no nocturia.  Musculoskeletal: There is no musculoskeletal pain and swelling.    Skin: He does not have any rashes.   Allergic/Immunologic: Negative.   Neurological: No Numbness  Hematological: Negative.   Psychiatric/Behavioral: No anxiety or depression symptoms.   Please see above.  The rest of the review of systems are negative for all systems.     Cancer Screening       Status Date      COLONOSCOPY Next Due 4/29/2021      Done 4/29/2016 COLONOSCOPY EXTERNAL RESULT          Patient Care Team:  Raheem Ríos MD as PCP - General (Family Medicine)  Jf Aguirre MD as Assigned PCP        History     Reviewed By Date/Time Sections Reviewed    Allie Becker CMA 10/22/2019  2:01 PM Tobacco            Objective:   Vital Signs:    Vitals:    10/22/19 1405   BP: 130/78   Pulse: 66   SpO2: 98%   Weight: (!) 226 lb (102.5 kg)   Height: 5' 7.5\" (1.715 m)     Physical Exam:  General Appearance: Alert, cooperative, no distress, appears stated age  Head: Normocephalic, without obvious abnormality, atraumatic  Eyes: PERRL, conjunctiva/corneas clear, EOM's intact  Ears: Normal TM's and external ear canals, both ears  Nose: Nares " normal, septum midline,mucosa normal, no drainage  Throat: Lips, mucosa, and tongue normal; teeth and gums normal  Neck: Supple, symmetrical, trachea midline, no adenopathy;  thyroid: not enlarged, symmetric, no tenderness  Back: Symmetric, no curvature, ROM normal, no CVA tenderness  Lungs: Clear to auscultation bilaterally, respirations unlabored  Heart: Regular rate and rhythm, S1 and S2 normal, no murmur, rub, or gallop,  Abdomen: Soft, non-tender, bowel sounds active all four quadrants,  no masses, no organomegaly  Musculoskeletal: Normal range of motion. No joint swelling or deformity.   Extremities: Extremities normal, atraumatic, no cyanosis or edema  Skin: Skin color, texture, turgor normal, no rashes or lesions  Lymph nodes: Cervical, supraclavicular, and axillary nodes normal  Neurologic: He is alert. He has normal reflexes.   Psychiatric: He has a normal mood and affect.       The 10-year ASCVD risk score (Penuelas MARII Jr., et al., 2013) is: 7.8%    Values used to calculate the score:      Age: 52 years      Sex: Male      Is Non- : No      Diabetic: No      Tobacco smoker: No      Systolic Blood Pressure: 130 mmHg      Is BP treated: Yes      HDL Cholesterol: 43 mg/dL      Total Cholesterol: 218 mg/dL         Medication List          Accurate as of October 22, 2019 11:59 PM. If you have any questions, ask your nurse or doctor.            CONTINUE taking these medications    amLODIPine 5 MG tablet  Also known as:  NORVASC  INSTRUCTIONS:  TAKE 1 TABLET BY MOUTH ONCE DAILY . APPOINTMENT REQUIRED FOR FUTURE REFILLS        blood pressure monitor Kit  INSTRUCTIONS:  Use 1 each As Directed daily.        doxycycline 100 MG capsule  Also known as:  MONODOX  INSTRUCTIONS:  2 tab to be taken X1               Additional Screenings Completed Today:           Yes

## 2021-08-01 ENCOUNTER — OUTPATIENT (OUTPATIENT)
Dept: OUTPATIENT SERVICES | Facility: HOSPITAL | Age: 46
LOS: 1 days | End: 2021-08-01
Payer: MEDICAID

## 2021-08-01 DIAGNOSIS — Z98.890 OTHER SPECIFIED POSTPROCEDURAL STATES: Chronic | ICD-10-CM

## 2021-08-20 ENCOUNTER — INPATIENT (INPATIENT)
Facility: HOSPITAL | Age: 46
LOS: 2 days | Discharge: HOME | DRG: 897 | End: 2021-08-23
Attending: INTERNAL MEDICINE | Admitting: STUDENT IN AN ORGANIZED HEALTH CARE EDUCATION/TRAINING PROGRAM
Payer: MEDICAID

## 2021-08-20 VITALS
HEIGHT: 66 IN | DIASTOLIC BLOOD PRESSURE: 90 MMHG | TEMPERATURE: 98 F | OXYGEN SATURATION: 98 % | RESPIRATION RATE: 18 BRPM | HEART RATE: 122 BPM | WEIGHT: 154.98 LBS | SYSTOLIC BLOOD PRESSURE: 145 MMHG

## 2021-08-20 DIAGNOSIS — Z98.890 OTHER SPECIFIED POSTPROCEDURAL STATES: Chronic | ICD-10-CM

## 2021-08-20 LAB
ALBUMIN SERPL ELPH-MCNC: 4.5 G/DL — SIGNIFICANT CHANGE UP (ref 3.3–5)
ALP SERPL-CCNC: 71 U/L — SIGNIFICANT CHANGE UP (ref 40–120)
ALT FLD-CCNC: 77 U/L — HIGH (ref 10–45)
AMPHET UR-MCNC: NEGATIVE — SIGNIFICANT CHANGE UP
ANION GAP SERPL CALC-SCNC: 24 MMOL/L — HIGH (ref 5–17)
AST SERPL-CCNC: 91 U/L — HIGH (ref 10–40)
BARBITURATES UR SCN-MCNC: NEGATIVE — SIGNIFICANT CHANGE UP
BASE EXCESS BLDV CALC-SCNC: -2.6 MMOL/L — LOW (ref -2–3)
BASOPHILS # BLD AUTO: 0.04 K/UL — SIGNIFICANT CHANGE UP (ref 0–0.2)
BASOPHILS NFR BLD AUTO: 0.5 % — SIGNIFICANT CHANGE UP (ref 0–2)
BENZODIAZ UR-MCNC: NEGATIVE — SIGNIFICANT CHANGE UP
BILIRUB SERPL-MCNC: 0.9 MG/DL — SIGNIFICANT CHANGE UP (ref 0.2–1.2)
BUN SERPL-MCNC: 10 MG/DL — SIGNIFICANT CHANGE UP (ref 7–23)
CA-I SERPL-SCNC: 1.06 MMOL/L — LOW (ref 1.15–1.33)
CALCIUM SERPL-MCNC: 9.5 MG/DL — SIGNIFICANT CHANGE UP (ref 8.4–10.5)
CHLORIDE SERPL-SCNC: 92 MMOL/L — LOW (ref 96–108)
CO2 BLDV-SCNC: 22.9 MMOL/L — SIGNIFICANT CHANGE UP (ref 22–26)
CO2 SERPL-SCNC: 21 MMOL/L — LOW (ref 22–31)
COCAINE METAB.OTHER UR-MCNC: NEGATIVE — SIGNIFICANT CHANGE UP
CREAT SERPL-MCNC: 0.5 MG/DL — SIGNIFICANT CHANGE UP (ref 0.5–1.3)
EOSINOPHIL # BLD AUTO: 0.01 K/UL — SIGNIFICANT CHANGE UP (ref 0–0.5)
EOSINOPHIL NFR BLD AUTO: 0.1 % — SIGNIFICANT CHANGE UP (ref 0–6)
ETHANOL SERPL-MCNC: 136 MG/DL — HIGH (ref 0–10)
GAS PNL BLDV: 132 MMOL/L — LOW (ref 136–145)
GAS PNL BLDV: SIGNIFICANT CHANGE UP
GAS PNL BLDV: SIGNIFICANT CHANGE UP
GLUCOSE SERPL-MCNC: 100 MG/DL — HIGH (ref 70–99)
HCG SERPL-ACNC: <0 MIU/ML — SIGNIFICANT CHANGE UP
HCO3 BLDV-SCNC: 22 MMOL/L — SIGNIFICANT CHANGE UP (ref 22–29)
HCT VFR BLD CALC: 38.2 % — SIGNIFICANT CHANGE UP (ref 34.5–45)
HGB BLD-MCNC: 13.5 G/DL — SIGNIFICANT CHANGE UP (ref 11.5–15.5)
IMM GRANULOCYTES NFR BLD AUTO: 0.6 % — SIGNIFICANT CHANGE UP (ref 0–1.5)
LYMPHOCYTES # BLD AUTO: 0.58 K/UL — LOW (ref 1–3.3)
LYMPHOCYTES # BLD AUTO: 6.7 % — LOW (ref 13–44)
MAGNESIUM SERPL-MCNC: 1.8 MG/DL — SIGNIFICANT CHANGE UP (ref 1.6–2.6)
MCHC RBC-ENTMCNC: 32.5 PG — SIGNIFICANT CHANGE UP (ref 27–34)
MCHC RBC-ENTMCNC: 35.3 GM/DL — SIGNIFICANT CHANGE UP (ref 32–36)
MCV RBC AUTO: 92 FL — SIGNIFICANT CHANGE UP (ref 80–100)
METHADONE UR-MCNC: NEGATIVE — SIGNIFICANT CHANGE UP
MONOCYTES # BLD AUTO: 0.82 K/UL — SIGNIFICANT CHANGE UP (ref 0–0.9)
MONOCYTES NFR BLD AUTO: 9.5 % — SIGNIFICANT CHANGE UP (ref 2–14)
NEUTROPHILS # BLD AUTO: 7.12 K/UL — SIGNIFICANT CHANGE UP (ref 1.8–7.4)
NEUTROPHILS NFR BLD AUTO: 82.6 % — HIGH (ref 43–77)
NRBC # BLD: 0 /100 WBCS — SIGNIFICANT CHANGE UP (ref 0–0)
OPIATES UR-MCNC: NEGATIVE — SIGNIFICANT CHANGE UP
OSMOLALITY SERPL: 315 MOSM/KG — HIGH (ref 275–300)
PCO2 BLDV: 36 MMHG — LOW (ref 39–42)
PCP SPEC-MCNC: SIGNIFICANT CHANGE UP
PCP UR-MCNC: NEGATIVE — SIGNIFICANT CHANGE UP
PH BLDV: 7.39 — SIGNIFICANT CHANGE UP (ref 7.32–7.43)
PHOSPHATE SERPL-MCNC: 1.7 MG/DL — LOW (ref 2.5–4.5)
PLATELET # BLD AUTO: 164 K/UL — SIGNIFICANT CHANGE UP (ref 150–400)
PO2 BLDV: <35 MMHG — SIGNIFICANT CHANGE UP (ref 25–45)
POTASSIUM BLDV-SCNC: 3.4 MMOL/L — LOW (ref 3.5–5.1)
POTASSIUM SERPL-MCNC: 3.9 MMOL/L — SIGNIFICANT CHANGE UP (ref 3.5–5.3)
POTASSIUM SERPL-SCNC: 3.9 MMOL/L — SIGNIFICANT CHANGE UP (ref 3.5–5.3)
PROT SERPL-MCNC: 7.2 G/DL — SIGNIFICANT CHANGE UP (ref 6–8.3)
RBC # BLD: 4.15 M/UL — SIGNIFICANT CHANGE UP (ref 3.8–5.2)
RBC # FLD: 12.8 % — SIGNIFICANT CHANGE UP (ref 10.3–14.5)
SAO2 % BLDV: 59.6 % — LOW (ref 67–88)
SODIUM SERPL-SCNC: 137 MMOL/L — SIGNIFICANT CHANGE UP (ref 135–145)
THC UR QL: NEGATIVE — SIGNIFICANT CHANGE UP
WBC # BLD: 8.62 K/UL — SIGNIFICANT CHANGE UP (ref 3.8–10.5)
WBC # FLD AUTO: 8.62 K/UL — SIGNIFICANT CHANGE UP (ref 3.8–10.5)

## 2021-08-20 PROCEDURE — 99285 EMERGENCY DEPT VISIT HI MDM: CPT

## 2021-08-20 PROCEDURE — 99223 1ST HOSP IP/OBS HIGH 75: CPT | Mod: GC

## 2021-08-20 RX ORDER — LANOLIN ALCOHOL/MO/W.PET/CERES
3 CREAM (GRAM) TOPICAL AT BEDTIME
Refills: 0 | Status: DISCONTINUED | OUTPATIENT
Start: 2021-08-20 | End: 2021-08-23

## 2021-08-20 RX ORDER — ACETAMINOPHEN 500 MG
650 TABLET ORAL EVERY 6 HOURS
Refills: 0 | Status: DISCONTINUED | OUTPATIENT
Start: 2021-08-20 | End: 2021-08-23

## 2021-08-20 RX ORDER — DULOXETINE HYDROCHLORIDE 30 MG/1
20 CAPSULE, DELAYED RELEASE ORAL EVERY 24 HOURS
Refills: 0 | Status: DISCONTINUED | OUTPATIENT
Start: 2021-08-21 | End: 2021-08-21

## 2021-08-20 RX ORDER — THIAMINE MONONITRATE (VIT B1) 100 MG
100 TABLET ORAL ONCE
Refills: 0 | Status: COMPLETED | OUTPATIENT
Start: 2021-08-20 | End: 2021-08-20

## 2021-08-20 RX ORDER — THIAMINE MONONITRATE (VIT B1) 100 MG
250 TABLET ORAL ONCE
Refills: 0 | Status: COMPLETED | OUTPATIENT
Start: 2021-08-20 | End: 2021-08-21

## 2021-08-20 RX ORDER — FOLIC ACID 0.8 MG
1 TABLET ORAL DAILY
Refills: 0 | Status: DISCONTINUED | OUTPATIENT
Start: 2021-08-20 | End: 2021-08-23

## 2021-08-20 RX ORDER — SODIUM CHLORIDE 9 MG/ML
1000 INJECTION INTRAMUSCULAR; INTRAVENOUS; SUBCUTANEOUS ONCE
Refills: 0 | Status: COMPLETED | OUTPATIENT
Start: 2021-08-20 | End: 2021-08-20

## 2021-08-20 RX ORDER — SODIUM CHLORIDE 9 MG/ML
1000 INJECTION, SOLUTION INTRAVENOUS
Refills: 0 | Status: DISCONTINUED | OUTPATIENT
Start: 2021-08-20 | End: 2021-08-21

## 2021-08-20 RX ADMIN — Medication 2 MILLIGRAM(S): at 22:00

## 2021-08-20 RX ADMIN — SODIUM CHLORIDE 110 MILLILITER(S): 9 INJECTION, SOLUTION INTRAVENOUS at 23:31

## 2021-08-20 RX ADMIN — Medication 100 MILLIGRAM(S): at 22:00

## 2021-08-20 RX ADMIN — SODIUM CHLORIDE 2000 MILLILITER(S): 9 INJECTION INTRAMUSCULAR; INTRAVENOUS; SUBCUTANEOUS at 22:00

## 2021-08-20 NOTE — ED PROVIDER NOTE - PROGRESS NOTE DETAILS
on reassessment pt mildly tremulous but improved, labs show high AG with low CO2, vbg shows normal pH concern for alcoholic ketoacidosis. thiamine given, will start IVF with dextrose and give librium. will admit for alcohol withdrawal and AKA pt aware and agrees signed out to kunal

## 2021-08-20 NOTE — ED ADULT NURSE NOTE - NSSEPSISCRITERIAMET_ED_A_ED
No chief complaint on file.      Initial /56  Pulse 60  Temp 97.7  F (36.5  C) (Oral)  Ht 6' (1.829 m)  Wt 172 lb (78 kg)  SpO2 99%  BMI 23.33 kg/m2 Estimated body mass index is 23.33 kg/(m^2) as calculated from the following:    Height as of this encounter: 6' (1.829 m).    Weight as of this encounter: 172 lb (78 kg).  Medication Reconciliation: complete  
Abnormal Lactate

## 2021-08-20 NOTE — ED PROVIDER NOTE - CLINICAL SUMMARY MEDICAL DECISION MAKING FREE TEXT BOX
45F c PMH of alcohol abuse, HTN p/w shakes associated with alcohol use and NBNB n/v. last drink earlier today. On exam, , VS otherwise wnl, pt with mild tremulousness, CIWA 8. pt denies SI/HI. no other remarkable exam findings. concern for mild alcohol withdrawal. will give ivf, thiamine, ativan and reassess

## 2021-08-20 NOTE — ED ADULT NURSE NOTE - OBJECTIVE STATEMENT
Pt presented to the ED with complaints of alcohol withdrawal and palpitations. Pt states that she was sober for two years and started drinking heavily again in the last three weeks. Pt states that her last drink was at 5pm. Pt is alert and oriented, ambulatory, denies chest pain, SOB, fever, nausea or vomiting. As per pt, she has been hospitalized in 2019 for alcohol withdrawal, denies withdrawal seizures. post-menopausal

## 2021-08-21 ENCOUNTER — TRANSCRIPTION ENCOUNTER (OUTPATIENT)
Age: 46
End: 2021-08-21

## 2021-08-21 DIAGNOSIS — Z87.898 PERSONAL HISTORY OF OTHER SPECIFIED CONDITIONS: ICD-10-CM

## 2021-08-21 DIAGNOSIS — F10.239 ALCOHOL DEPENDENCE WITH WITHDRAWAL, UNSPECIFIED: ICD-10-CM

## 2021-08-21 DIAGNOSIS — E87.1 HYPO-OSMOLALITY AND HYPONATREMIA: ICD-10-CM

## 2021-08-21 DIAGNOSIS — F32.9 MAJOR DEPRESSIVE DISORDER, SINGLE EPISODE, UNSPECIFIED: ICD-10-CM

## 2021-08-21 DIAGNOSIS — R63.8 OTHER SYMPTOMS AND SIGNS CONCERNING FOOD AND FLUID INTAKE: ICD-10-CM

## 2021-08-21 DIAGNOSIS — E87.2 ACIDOSIS: ICD-10-CM

## 2021-08-21 LAB
ANION GAP SERPL CALC-SCNC: 10 MMOL/L — SIGNIFICANT CHANGE UP (ref 5–17)
ANION GAP SERPL CALC-SCNC: 11 MMOL/L — SIGNIFICANT CHANGE UP (ref 5–17)
ANION GAP SERPL CALC-SCNC: 18 MMOL/L — HIGH (ref 5–17)
APPEARANCE UR: CLEAR — SIGNIFICANT CHANGE UP
B-OH-BUTYR SERPL-SCNC: 1.3 MMOL/L — HIGH
B-OH-BUTYR SERPL-SCNC: 3.1 MMOL/L — HIGH
BACTERIA # UR AUTO: PRESENT /HPF
BILIRUB UR-MCNC: ABNORMAL
BUN SERPL-MCNC: 5 MG/DL — LOW (ref 7–23)
BUN SERPL-MCNC: 7 MG/DL — SIGNIFICANT CHANGE UP (ref 7–23)
BUN SERPL-MCNC: 8 MG/DL — SIGNIFICANT CHANGE UP (ref 7–23)
CALCIUM SERPL-MCNC: 8.4 MG/DL — SIGNIFICANT CHANGE UP (ref 8.4–10.5)
CALCIUM SERPL-MCNC: 8.8 MG/DL — SIGNIFICANT CHANGE UP (ref 8.4–10.5)
CALCIUM SERPL-MCNC: 9.2 MG/DL — SIGNIFICANT CHANGE UP (ref 8.4–10.5)
CHLORIDE SERPL-SCNC: 101 MMOL/L — SIGNIFICANT CHANGE UP (ref 96–108)
CHLORIDE SERPL-SCNC: 102 MMOL/L — SIGNIFICANT CHANGE UP (ref 96–108)
CHLORIDE SERPL-SCNC: 94 MMOL/L — LOW (ref 96–108)
CO2 SERPL-SCNC: 21 MMOL/L — LOW (ref 22–31)
CO2 SERPL-SCNC: 22 MMOL/L — SIGNIFICANT CHANGE UP (ref 22–31)
CO2 SERPL-SCNC: 25 MMOL/L — SIGNIFICANT CHANGE UP (ref 22–31)
COLOR SPEC: YELLOW — SIGNIFICANT CHANGE UP
COVID-19 SPIKE DOMAIN AB INTERP: POSITIVE
COVID-19 SPIKE DOMAIN ANTIBODY RESULT: >250 U/ML — HIGH
CREAT SERPL-MCNC: 0.43 MG/DL — LOW (ref 0.5–1.3)
CREAT SERPL-MCNC: 0.48 MG/DL — LOW (ref 0.5–1.3)
CREAT SERPL-MCNC: 0.52 MG/DL — SIGNIFICANT CHANGE UP (ref 0.5–1.3)
DIFF PNL FLD: ABNORMAL
EPI CELLS # UR: SIGNIFICANT CHANGE UP /HPF (ref 0–5)
GLUCOSE SERPL-MCNC: 109 MG/DL — HIGH (ref 70–99)
GLUCOSE SERPL-MCNC: 155 MG/DL — HIGH (ref 70–99)
GLUCOSE SERPL-MCNC: 98 MG/DL — SIGNIFICANT CHANGE UP (ref 70–99)
GLUCOSE UR QL: NEGATIVE — SIGNIFICANT CHANGE UP
GRAN CASTS # UR COMP ASSIST: ABNORMAL /LPF
HCT VFR BLD CALC: 34 % — LOW (ref 34.5–45)
HGB BLD-MCNC: 11.6 G/DL — SIGNIFICANT CHANGE UP (ref 11.5–15.5)
KETONES UR-MCNC: >=80 MG/DL
LACTATE SERPL-SCNC: 0.9 MMOL/L — SIGNIFICANT CHANGE UP (ref 0.5–2)
LACTATE SERPL-SCNC: 4.8 MMOL/L — CRITICAL HIGH (ref 0.5–2)
LEUKOCYTE ESTERASE UR-ACNC: NEGATIVE — SIGNIFICANT CHANGE UP
MAGNESIUM SERPL-MCNC: 1.9 MG/DL — SIGNIFICANT CHANGE UP (ref 1.6–2.6)
MCHC RBC-ENTMCNC: 31.8 PG — SIGNIFICANT CHANGE UP (ref 27–34)
MCHC RBC-ENTMCNC: 34.1 GM/DL — SIGNIFICANT CHANGE UP (ref 32–36)
MCV RBC AUTO: 93.2 FL — SIGNIFICANT CHANGE UP (ref 80–100)
NITRITE UR-MCNC: NEGATIVE — SIGNIFICANT CHANGE UP
NRBC # BLD: 0 /100 WBCS — SIGNIFICANT CHANGE UP (ref 0–0)
PH UR: 6 — SIGNIFICANT CHANGE UP (ref 5–8)
PHOSPHATE SERPL-MCNC: 2.8 MG/DL — SIGNIFICANT CHANGE UP (ref 2.5–4.5)
PLATELET # BLD AUTO: 140 K/UL — LOW (ref 150–400)
POTASSIUM SERPL-MCNC: 3.4 MMOL/L — LOW (ref 3.5–5.3)
POTASSIUM SERPL-MCNC: 3.8 MMOL/L — SIGNIFICANT CHANGE UP (ref 3.5–5.3)
POTASSIUM SERPL-MCNC: 4.3 MMOL/L — SIGNIFICANT CHANGE UP (ref 3.5–5.3)
POTASSIUM SERPL-SCNC: 3.4 MMOL/L — LOW (ref 3.5–5.3)
POTASSIUM SERPL-SCNC: 3.8 MMOL/L — SIGNIFICANT CHANGE UP (ref 3.5–5.3)
POTASSIUM SERPL-SCNC: 4.3 MMOL/L — SIGNIFICANT CHANGE UP (ref 3.5–5.3)
PROT UR-MCNC: 100 MG/DL
RBC # BLD: 3.65 M/UL — LOW (ref 3.8–5.2)
RBC # FLD: 13.1 % — SIGNIFICANT CHANGE UP (ref 10.3–14.5)
RBC CASTS # UR COMP ASSIST: < 5 /HPF — SIGNIFICANT CHANGE UP
SARS-COV-2 IGG+IGM SERPL QL IA: >250 U/ML — HIGH
SARS-COV-2 IGG+IGM SERPL QL IA: POSITIVE
SARS-COV-2 RNA SPEC QL NAA+PROBE: SIGNIFICANT CHANGE UP
SODIUM SERPL-SCNC: 133 MMOL/L — LOW (ref 135–145)
SODIUM SERPL-SCNC: 135 MMOL/L — SIGNIFICANT CHANGE UP (ref 135–145)
SODIUM SERPL-SCNC: 136 MMOL/L — SIGNIFICANT CHANGE UP (ref 135–145)
SP GR SPEC: >=1.03 — SIGNIFICANT CHANGE UP (ref 1–1.03)
UROBILINOGEN FLD QL: 0.2 E.U./DL — SIGNIFICANT CHANGE UP
WBC # BLD: 10.2 K/UL — SIGNIFICANT CHANGE UP (ref 3.8–10.5)
WBC # FLD AUTO: 10.2 K/UL — SIGNIFICANT CHANGE UP (ref 3.8–10.5)
WBC UR QL: < 5 /HPF — SIGNIFICANT CHANGE UP

## 2021-08-21 PROCEDURE — 99233 SBSQ HOSP IP/OBS HIGH 50: CPT | Mod: GC

## 2021-08-21 RX ORDER — MAGNESIUM SULFATE 500 MG/ML
2 VIAL (ML) INJECTION ONCE
Refills: 0 | Status: COMPLETED | OUTPATIENT
Start: 2021-08-21 | End: 2021-08-21

## 2021-08-21 RX ORDER — THIAMINE MONONITRATE (VIT B1) 100 MG
100 TABLET ORAL DAILY
Refills: 0 | Status: DISCONTINUED | OUTPATIENT
Start: 2021-08-21 | End: 2021-08-23

## 2021-08-21 RX ORDER — SODIUM CHLORIDE 9 MG/ML
1000 INJECTION INTRAMUSCULAR; INTRAVENOUS; SUBCUTANEOUS ONCE
Refills: 0 | Status: COMPLETED | OUTPATIENT
Start: 2021-08-21 | End: 2021-08-21

## 2021-08-21 RX ORDER — POTASSIUM PHOSPHATE, MONOBASIC POTASSIUM PHOSPHATE, DIBASIC 236; 224 MG/ML; MG/ML
15 INJECTION, SOLUTION INTRAVENOUS ONCE
Refills: 0 | Status: COMPLETED | OUTPATIENT
Start: 2021-08-21 | End: 2021-08-21

## 2021-08-21 RX ORDER — ENOXAPARIN SODIUM 100 MG/ML
40 INJECTION SUBCUTANEOUS EVERY 24 HOURS
Refills: 0 | Status: DISCONTINUED | OUTPATIENT
Start: 2021-08-21 | End: 2021-08-21

## 2021-08-21 RX ORDER — POTASSIUM CHLORIDE 20 MEQ
40 PACKET (EA) ORAL EVERY 4 HOURS
Refills: 0 | Status: COMPLETED | OUTPATIENT
Start: 2021-08-21 | End: 2021-08-21

## 2021-08-21 RX ADMIN — Medication 2 MILLIGRAM(S): at 12:11

## 2021-08-21 RX ADMIN — Medication 1 MILLIGRAM(S): at 11:00

## 2021-08-21 RX ADMIN — Medication 40 MILLIEQUIVALENT(S): at 06:02

## 2021-08-21 RX ADMIN — Medication 2 MILLIGRAM(S): at 02:33

## 2021-08-21 RX ADMIN — ENOXAPARIN SODIUM 40 MILLIGRAM(S): 100 INJECTION SUBCUTANEOUS at 06:02

## 2021-08-21 RX ADMIN — Medication 40 MILLIEQUIVALENT(S): at 00:55

## 2021-08-21 RX ADMIN — Medication 50 GRAM(S): at 00:33

## 2021-08-21 RX ADMIN — SODIUM CHLORIDE 1000 MILLILITER(S): 9 INJECTION INTRAMUSCULAR; INTRAVENOUS; SUBCUTANEOUS at 00:26

## 2021-08-21 RX ADMIN — Medication 102.5 MILLIGRAM(S): at 02:24

## 2021-08-21 RX ADMIN — Medication 2 MILLIGRAM(S): at 18:50

## 2021-08-21 RX ADMIN — Medication 100 MILLIGRAM(S): at 11:00

## 2021-08-21 RX ADMIN — POTASSIUM PHOSPHATE, MONOBASIC POTASSIUM PHOSPHATE, DIBASIC 62.5 MILLIMOLE(S): 236; 224 INJECTION, SOLUTION INTRAVENOUS at 09:07

## 2021-08-21 RX ADMIN — Medication 85 MILLIMOLE(S): at 02:24

## 2021-08-21 RX ADMIN — Medication 2 MILLIGRAM(S): at 06:01

## 2021-08-21 RX ADMIN — Medication 1 TABLET(S): at 11:00

## 2021-08-21 NOTE — H&P ADULT - HISTORY OF PRESENT ILLNESS
44 year old female with a pmhx of alcohol use disorder with multiple admissions for alcohol withdrawal presents with chief complaint of alcohol withdrawal. Patient's last drink was at 5pm today and states that shortly after that she began to feel heart palpitations. States that she was sober for 1.5 years until about 3 weeks ago when she began drinking 1/2 liter of whiskey a day after she had a sinus infection.  Denies any anxiety, headache, hallucinations, chest pain, dyspnea, n/v/d. Denies any history of intubations, DTs, or seizures.    ED Course:  Temp 98.4, , /90, saturating 98% on RA  Labs: Na 137, Cl 92, AG 21, AG 24, glucose 100, phos 1.7, Mg 1.8,   Intervention: given ativan 2mg IV, 1L NS

## 2021-08-21 NOTE — H&P ADULT - PROBLEM SELECTOR PLAN 2
Patient presents with anion gap of 24, bicarb of 21, Cl 92, glucose of 100, admitted for management of ketoacidosis, likely secondary to alcoholic starvation ketosis given report of 3 week alcohol binge. pH 7.39 on VBG. Lactate and BHB drawn after 1L of NS given by ED were elevated to 4.8 and 3.1, respectively. Subsequent BMP shows improvement of AG to 18. UA positive for ketones  -s/p 1L NS in ED, started on d5NS @110cc/hr   -ordered for additional 1L NS  -f/u repeat lactate in AM, trend to clearance

## 2021-08-21 NOTE — PROGRESS NOTE ADULT - SUBJECTIVE AND OBJECTIVE BOX
** INCOMPLETE **    OVERNIGHT EVENTS:     SUBJECTIVE:    VITAL SIGNS:  Vital Signs Last 24 Hrs  T(C): 36.8 (21 Aug 2021 06:13), Max: 37.1 (21 Aug 2021 00:38)  T(F): 98.3 (21 Aug 2021 06:13), Max: 98.7 (21 Aug 2021 00:38)  HR: 99 (21 Aug 2021 06:13) (90 - 122)  BP: 146/81 (21 Aug 2021 06:13) (126/74 - 146/81)  BP(mean): --  RR: 18 (21 Aug 2021 06:13) (18 - 18)  SpO2: 95% (21 Aug 2021 06:13) (95% - 98%)    PHYSICAL EXAM:  General: NAD; speaking in full sentences  HEENT: NC/AT; PERRL; EOMI; MMM  Neck: supple; no JVD  Cardiac: RRR; +S1/S2  Pulm: CTA B/L; no W/R/R  GI: soft, NT/ND, +BS  Extremities: WWP; no edema, clubbing or cyanosis  Vasc: 2+ radial, DP pulses B/L  Neuro: AAOx3; no focal deficits    MEDICATIONS:  MEDICATIONS  (STANDING):  dextrose 5% + sodium chloride 0.9%. 1000 milliLiter(s) (110 mL/Hr) IV Continuous <Continuous>  folic acid 1 milliGRAM(s) Oral daily  LORazepam     Tablet 2 milliGRAM(s) Oral every 4 hours  multivitamin 1 Tablet(s) Oral daily  thiamine 100 milliGRAM(s) Oral daily    MEDICATIONS  (PRN):  acetaminophen   Tablet .. 650 milliGRAM(s) Oral every 6 hours PRN Temp greater or equal to 38.5C (101.3F), Mild Pain (1 - 3)  LORazepam   Injectable 1 milliGRAM(s) IV Push every 1 hour PRN CIWA-Ar score 8 or greater  melatonin 3 milliGRAM(s) Oral at bedtime PRN Insomnia      ALLERGIES:  Allergies    predniSONE (Rash)  Tamiflu (Other)    Intolerances        LABS:                        11.6   10.20 )-----------( 140      ( 21 Aug 2021 07:28 )             34.0     08-21    135  |  102  |  7   ----------------------------<  155<H>  4.3   |  22  |  0.43<L>    Ca    8.4      21 Aug 2021 07:28  Phos  2.8     08-21  Mg     1.9     08-21    TPro  7.2  /  Alb  4.5  /  TBili  0.9  /  DBili  x   /  AST  91<H>  /  ALT  77<H>  /  AlkPhos  71  08-20        RADIOLOGY & ADDITIONAL TESTS: Reviewed. OVERNIGHT EVENTS:   No acute events overnight.     SUBJECTIVE:  Patient seen and examined at bedside, resting comfortably in bed, and does not appear to be in any acute distress. Patient states she is still feeling a band like headache, worsened when she turns her head too quickly. 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 She states she resumed alcohol consumption after she had sinusitis and was forced to stay home. Patient states she is upset with herself for "making a stupid decision". Patient is AAOx3 and is not currently having tremors, tactile or auditory hallucinations, diaphoresis, agitation, or anxiety. She denies any recent or active fever, chills, nausea, vomiting, headache, acute sob, chest pain, abdominal pain, genitourinary sx, extremity pain or swelling.     VITAL SIGNS:  Vital Signs Last 24 Hrs  T(C): 36.8 (21 Aug 2021 06:13), Max: 37.1 (21 Aug 2021 00:38)  T(F): 98.3 (21 Aug 2021 06:13), Max: 98.7 (21 Aug 2021 00:38)  HR: 99 (21 Aug 2021 06:13) (90 - 122)  BP: 146/81 (21 Aug 2021 06:13) (126/74 - 146/81)  BP(mean): --  RR: 18 (21 Aug 2021 06:13) (18 - 18)  SpO2: 95% (21 Aug 2021 06:13) (95% - 98%)    PHYSICAL EXAM:  General: NAD; speaking in full sentences, AAOx3  HEENT: NC/AT; PERRL; EOMI; MMM, mild discomfort on lateral movement of her head  Neck: supple; no JVD  Cardiac: RRR; +S1/S2, No MGR  Pulm: CTA B/L; no W/R/R  GI: soft, NT/ND, +BSx4, no HSM, no guarding or rebound tenderness, Cardenas neg  Extremities: WWP; no edema, clubbing or cyanosis, minimal tremors appreciated bilaterally on flexion of wrists  Vasc: 2+ radial, DP pulses B/L  Neuro: AAOx3; no focal deficits, sensation grossly intact, strength 5/5 throughout     MEDICATIONS:  MEDICATIONS  (STANDING):  dextrose 5% + sodium chloride 0.9%. 1000 milliLiter(s) (110 mL/Hr) IV Continuous <Continuous>  folic acid 1 milliGRAM(s) Oral daily  LORazepam     Tablet 2 milliGRAM(s) Oral every 4 hours  multivitamin 1 Tablet(s) Oral daily  thiamine 100 milliGRAM(s) Oral daily    MEDICATIONS  (PRN):  acetaminophen   Tablet .. 650 milliGRAM(s) Oral every 6 hours PRN Temp greater or equal to 38.5C (101.3F), Mild Pain (1 - 3)  LORazepam   Injectable 1 milliGRAM(s) IV Push every 1 hour PRN CIWA-Ar score 8 or greater  melatonin 3 milliGRAM(s) Oral at bedtime PRN Insomnia      ALLERGIES:  Allergies    predniSONE (Rash)  Tamiflu (Other)    Intolerances        LABS:                        11.6   10.20 )-----------( 140      ( 21 Aug 2021 07:28 )             34.0     08-21    135  |  102  |  7   ----------------------------<  155<H>  4.3   |  22  |  0.43<L>    Ca    8.4      21 Aug 2021 07:28  Phos  2.8     08-21  Mg     1.9     08-21    TPro  7.2  /  Alb  4.5  /  TBili  0.9  /  DBili  x   /  AST  91<H>  /  ALT  77<H>  /  AlkPhos  71  08-20        RADIOLOGY & ADDITIONAL TESTS: Reviewed.

## 2021-08-21 NOTE — DISCHARGE NOTE PROVIDER - PROVIDER TOKENS
PROVIDER:[TOKEN:[[id number]]] PROVIDER:[TOKEN:[[id number]],SCHEDULEDAPPT:[09/02/2021],SCHEDULEDAPPTTIME:[12:30 PM]]

## 2021-08-21 NOTE — H&P ADULT - ATTENDING COMMENTS
reviewed pertinent data , h&p  patient seen and examined overnight   reports resumption of ETOH use after sinus infection     LMP occuring now     pt w/ Slow speech Tongue fasciculations on exam, rest of exam as above     1. ETOH w/d : monitor CIWA, on standing and prn Ativan   2. AGMA/ hyponatremia:  monitor BMP , trend lactate , check urine studies; on IVF o/n , montior for re-feeding syndome       rest of  plan as above

## 2021-08-21 NOTE — H&P ADULT - NSHPLABSRESULTS_GEN_ALL_CORE
.  LABS:                         13.5   8.62  )-----------( 164      ( 20 Aug 2021 21:57 )             38.2     08-20    137  |  92<L>  |  10  ----------------------------<  100<H>  3.9   |  21<L>  |  0.50    Ca    9.5      20 Aug 2021 21:57  Phos  1.7     08-20  Mg     1.8     08-20    TPro  7.2  /  Alb  4.5  /  TBili  0.9  /  DBili  x   /  AST  91<H>  /  ALT  77<H>  /  AlkPhos  71  08-20      Urinalysis Basic - ( 21 Aug 2021 00:05 )    Color: x / Appearance: x / SG: x / pH: x  Gluc: x / Ketone: x  / Bili: x / Urobili: x   Blood: x / Protein: x / Nitrite: x   Leuk Esterase: x / RBC: < 5 /HPF / WBC < 5 /HPF   Sq Epi: x / Non Sq Epi: 0-5 /HPF / Bacteria: Present /HPF      Lactate, Blood: 4.8 mmol/L (08-21 @ 00:05)      RADIOLOGY, EKG & ADDITIONAL TESTS: Reviewed.

## 2021-08-21 NOTE — DISCHARGE NOTE PROVIDER - NSDCFUADDAPPT_GEN_ALL_CORE_FT
1. You will receive a call from our [facility] clinic to schedule an appointment. Please follow up with your Primary Care Physician within 2 weeks. Please bring your Insurance card, Photo ID, Covid-19 vaccination card (if applicable) and Discharge paperwork to the following appointment:    (1) Please follow up with Dr. Suha Guerrero on behalf of Dr. Renetta Hdz at the [facility] located at [address] [place] [address], [address] [place] [address] on 09/02/2021 at 12:30pm.    Appointment was scheduled by Ms. DANILO Gutierrez, Referral Coordinator.

## 2021-08-21 NOTE — H&P ADULT - PROBLEM SELECTOR PLAN 3
Patient has history of alcohol use disorder with multiple prior admissions for withdrawal, currently admitted after a 3 week binge of one bottle of whiskey per 2 days. Reports wanting to quit, had previously been sober for 1.5 years prior to this binge. Follows with outpatient facility  -social work consult  -plan per "alcohol withdrawal" Patient has history of alcohol use disorder with multiple prior admissions for withdrawal, currently admitted after a 3 week binge of one bottle of whiskey per 2 days. Reports wanting to quit, had previously been sober for 1.5 years prior to this binge. Follows with outpatient facility  -plan per "alcohol withdrawal" ADDENDUM: cause unclear, monitor BMP, check urine lytes/ osm

## 2021-08-21 NOTE — DISCHARGE NOTE PROVIDER - HOSPITAL COURSE
#Discharge: do not delete    Patient is a 45 year old female with a pmhx of alcohol use disorder with multiple admission for alcohol withdrawal, admitted for treatment of alcohol withdrawal and alcoholic ketoacidosis.    Inpatient treatment course:     Problem List/Main Diagnoses:     New medications/therapies:   New lines/hardware:  Labs to be followed outpatient:   Exam to be followed outpatient:     Discharge plan: discharge to Home     #Discharge: do not delete    Patient is a 45 year old female with a pmhx of alcohol use disorder with multiple admission for alcohol withdrawal, admitted for treatment of alcohol withdrawal and alcoholic ketoacidosis.    Inpatient treatment course:   Decreased ativan from 2mg q8hr to 2mg q6hr. Subsequent decreased 2mg q8hr. Repeat CIWA 3. Patient improving and deemed stable for discharge. Advised against alcohol abuse and symptoms associated with withdrawals. Patient was medically optimized, stable and ready for discharge. Plan of care and return precautions were discussed with the patient who verbally stated understanding.    Problem List/Main Diagnoses:   #Alcohol withdrawal.   Patient with multiple prior admission for alcohol withdrawal presents with tremors, palpitations, report of recent alcohol binge, found to be tachycardic, elevated blood alcohol level, anion gap, decreased bicarb, admitted for treatment of alcohol withdrawal. Patient continues to be tachycardic, mentating well, alert and oriented x3, no agitation.  Plan:  -repeat CIWA 3 (+1 tremors, +2 headache/fullness) @ 10:30AM, patient is currently in NAD.  -continue ativan 2mg PO q4h, taper as indicated  -ativan IV push 1mg for ciwa greater than 8  -ciwa q8h  -thiamine 250mg IV, folic acid, and multivitamin.     #High anion gap metabolic acidosis - RESOLVED  Patient presents with anion gap of 24, bicarb of 21, Cl 92, glucose of 100, admitted for management of ketoacidosis, likely secondary to alcoholic starvation ketosis given report of 3 week alcohol binge. pH 7.39 on VBG. Lactate and BHB drawn after 1L of NS given by ED were elevated to 4.8 and 3.1, respectively. Subsequent BMP shows improvement of AG to 18. UA positive for ketones  - AG closed x1 ~11  - BHB downtrending to 1.3  - d/c d5NS @110cc/hr as patient is tolerating PO intake    #Hyponatremia - RESOLVED  Cause unclear, monitor BMP, check urine lytes/ osm  On admission with Na of 133.  - repeat Na 135, electrolytes wnl    #Elevated Lactate - RESOLVED  Patient arrived with elevated lactate levels 4.8  -Patient received 1LNS and d5NS @110cc/hr x 12 hours  -repeat lactate cleared 0.9    #History of alcohol use disorder.   Patient has history of alcohol use disorder with multiple prior admissions for withdrawal, currently admitted after a 3 week binge of one bottle of whiskey per 2 days. Reports wanting to quit, had previously been sober for 1.5 years prior to this binge. Follows with outpatient facility  -plan per "alcohol withdrawal"    New medications/therapies: None  New lines/hardware: None  Labs to be followed outpatient: None  Exam to be followed outpatient: None    Discharge plan: discharge to Home     #Discharge: do not delete    Patient is a 45 year old female with a pmhx of alcohol use disorder with multiple admission for alcohol withdrawal, admitted for treatment of alcohol withdrawal and alcoholic ketoacidosis.    Inpatient treatment course:   Decreased ativan from 2mg q8hr to 2mg q6hr. Subsequent decreased 2mg q8hr. Repeat CIWA 3. Patient improving and deemed stable for discharge. Advised against alcohol abuse and symptoms associated with withdrawals. Patient was medically optimized, stable and ready for discharge. Plan of care and return precautions were discussed with the patient who verbally stated understanding.    Problem List/Main Diagnoses:   #Alcohol withdrawal. - RESOLVED   Patient with multiple prior admission for alcohol withdrawal presents with tremors, palpitations, report of recent alcohol binge, found to be tachycardic, elevated blood alcohol level, anion gap, decreased bicarb, admitted for treatment of alcohol withdrawal. Patient continues to be tachycardic, mentating well, alert and oriented x3, no agitation. Repeat CIWA 0. Ativan tapered from 2mg q4 to BID  - f/u with PCP within 1 week of discharge   - Counseled on alcohol abstinence.     #High anion gap metabolic acidosis - RESOLVED  Patient presents with anion gap of 24, bicarb of 21, Cl 92, glucose of 100, admitted for management of ketoacidosis, likely secondary to alcoholic starvation ketosis given report of 3 week alcohol binge. pH 7.39 on VBG. Lactate and BHB drawn after 1L of NS given by ED were elevated to 4.8 and 3.1, respectively. Subsequent BMP shows improvement of AG to 18. UA positive for ketones  - AG closed x1 ~11  - BHB downtrending to 1.3  - d/c d5NS @110cc/hr as patient is tolerating PO intake    #Hyponatremia - RESOLVED  Cause unclear, monitor BMP, check urine lytes/ osm  On admission with Na of 133.  -repeat Na 135, electrolytes wnl    #Elevated Lactate - RESOLVED  Patient arrived with elevated lactate levels 4.8  -Patient received 1LNS and d5NS @110cc/hr x 12 hours  -repeat lactate cleared 0.9    #History of alcohol use disorder.   Patient has history of alcohol use disorder with multiple prior admissions for withdrawal, currently admitted after a 3 week binge of one bottle of whiskey per 2 days. Reports wanting to quit, had previously been sober for 1.5 years prior to this binge. Follows with outpatient facility  -plan per "alcohol withdrawal"    New medications/therapies: None  New lines/hardware: None  Labs to be followed outpatient: None  Exam to be followed outpatient: None    Discharge plan: discharge to Home     #Discharge: do not delete    Patient is a 45 year old female with a pmhx of alcohol use disorder with multiple admission for alcohol withdrawal, admitted for treatment of alcohol withdrawal and alcoholic ketoacidosis.    pt got standing ativan at 2AM and 10AM, c/o unsteadiness and "blurry" vision (has to hold phone screen closer to read texts) after ativan. also c/o dizziness with neck very flexed while in hospital, none w/ head in normal position. ativan changed to 2mg q12h.     Inpatient treatment course:   Decreased ativan from 2mg q8hr to 2mg q6hr. Subsequent decreased 2mg q8hr. Repeat CIWA 3.  Patient c/o unsteadiness and "blurry" vision (has to hold phone screen closer to read texts) after ativan dose. Tapered ativan 2mg q8hr to 2mg BID. Repeat CIWA 0. Patient improving and deemed stable for discharge. Advised against alcohol abuse and symptoms associated with withdrawals. Patient was medically optimized, stable and ready for discharge. Plan of care and return precautions were discussed with the patient who verbally stated understanding.    Problem List/Main Diagnoses:   #Alcohol withdrawal. - RESOLVED   Patient with multiple prior admission for alcohol withdrawal presents with tremors, palpitations, report of recent alcohol binge, found to be tachycardic, elevated blood alcohol level, anion gap, decreased bicarb, admitted for treatment of alcohol withdrawal. Patient continues to be tachycardic, mentating well, alert and oriented x3, no agitation. Repeat CIWA 0. Ativan tapered from 2mg q4 to BID  - f/u with PCP within 1 week of discharge   - Counseled on alcohol abstinence - Patient stated she has been to AA before and it has not helped - Declined further AA reccs    #History of alcohol use disorder.   Patient has history of alcohol use disorder with multiple prior admissions for withdrawal, currently admitted after a 3 week binge of one bottle of whiskey per 2 days. Reports wanting to quit, had previously been sober for 1.5 years prior to this binge. Follows with outpatient facility  - f/u with PCP within 1 week of discharge   - Counseled on alcohol abstinence (see above)    #High anion gap metabolic acidosis - RESOLVED  Patient presents with anion gap of 24, bicarb of 21, Cl 92, glucose of 100, admitted for management of ketoacidosis, likely secondary to alcoholic starvation ketosis given report of 3 week alcohol binge. pH 7.39 on VBG. Lactate and BHB drawn after 1L of NS given by ED were elevated to 4.8 and 3.1, respectively. Subsequent BMP shows improvement of AG to 18. UA positive for ketones. AG closed.. BHB now wnl. D/c d5NS @110cc/hr as patient is tolerating PO intake  - NTD    #Hyponatremia - RESOLVED  Cause unclear, monitor BMP, check urine lytes/ osm  On admission with Na of 133.  -repeat Na 135, electrolytes wnl  -NTD    #Elevated Lactate - RESOLVED  Patient arrived with elevated lactate levels 4.8  -Patient received 1LNS and d5NS @110cc/hr x 12 hours  -repeat lactate cleared 0.9  -NTD    New medications/therapies: None  New lines/hardware: None  Labs to be followed outpatient: None  Exam to be followed outpatient: None    Discharge plan: discharge to Home     #Discharge: do not delete    Patient is a 45 year old female with a pmhx of alcohol use disorder with multiple admission for alcohol withdrawal, admitted for treatment of alcohol withdrawal and alcoholic ketoacidosis.    Inpatient treatment course:   Decreased ativan from 2mg q8hr to 2mg q6hr. Subsequent decreased 2mg q8hr. Repeat CIWA 3.  Patient c/o unsteadiness and "blurry" vision (has to hold phone screen closer to read texts) after ativan dose. Tapered ativan 2mg q8hr to 2mg BID. Repeat CIWA 0. Patient improving and deemed stable for discharge. Advised against alcohol abuse and symptoms associated with withdrawals. Patient was medically optimized, stable and ready for discharge. Plan of care and return precautions were discussed with the patient who verbally stated understanding.    Problem List/Main Diagnoses:   #Alcohol withdrawal. - RESOLVED   Patient with multiple prior admission for alcohol withdrawal presents with tremors, palpitations, report of recent alcohol binge, found to be tachycardic, elevated blood alcohol level, anion gap, decreased bicarb, admitted for treatment of alcohol withdrawal. Patient continues to be tachycardic, mentating well, alert and oriented x3, no agitation. Repeat CIWA 0. Ativan tapered from 2mg q4 to BID  - f/u with PCP within 1 week of discharge   - Counseled on alcohol abstinence - Patient stated she has been to AA before and it has not helped - Declined further AA reccs    #History of alcohol use disorder.   Patient has history of alcohol use disorder with multiple prior admissions for withdrawal, currently admitted after a 3 week binge of one bottle of whiskey per 2 days. Reports wanting to quit, had previously been sober for 1.5 years prior to this binge. Follows with outpatient facility  - f/u with PCP within 1 week of discharge   - Counseled on alcohol abstinence (see above)    #High anion gap metabolic acidosis - RESOLVED  Patient presents with anion gap of 24, bicarb of 21, Cl 92, glucose of 100, admitted for management of ketoacidosis, likely secondary to alcoholic starvation ketosis given report of 3 week alcohol binge. pH 7.39 on VBG. Lactate and BHB drawn after 1L of NS given by ED were elevated to 4.8 and 3.1, respectively. Subsequent BMP shows improvement of AG to 18. UA positive for ketones. AG closed.. BHB now wnl. D/c d5NS @110cc/hr as patient is tolerating PO intake  - NTD    #Hyponatremia - RESOLVED  Cause unclear, monitor BMP, check urine lytes/ osm  On admission with Na of 133.  -repeat Na 135, electrolytes wnl  -NTD    #Elevated Lactate - RESOLVED  Patient arrived with elevated lactate levels 4.8  -Patient received 1LNS and d5NS @110cc/hr x 12 hours  -repeat lactate cleared 0.9  -NTD    New medications/therapies: None  New lines/hardware: None  Labs to be followed outpatient: None  Exam to be followed outpatient: None    Discharge plan: discharge to Home

## 2021-08-21 NOTE — PROGRESS NOTE ADULT - PROBLEM SELECTOR PLAN 5
F: s/p 2 L NS, on d5NS @110cc/hr  E: replete aggressively  Diet: regular  DVT: Not indicated  Dispo: [facility] F: none  E: replete aggressively  Diet: regular  DVT: Not indicated  Dispo: [facility]

## 2021-08-21 NOTE — SBIRT NOTE ADULT - NSSBIRTNALOXDUR_GEN_A_CORE
10
FREE:[LAST:[Lees],FIRST:[Clara],PHONE:[phone]],FAX:[(   )    -],ADDRESS:[[address] [place] [address]],FOLLOWUP:[1-3 days]]

## 2021-08-21 NOTE — H&P ADULT - ASSESSMENT
Patient is a 45 year old female with a pmhx of alcohol use disorder with multiple admission for alcohol withdrawal, admitted for treatment of alcohol withdrawal and alcoholic ketoacidosis.

## 2021-08-21 NOTE — SBIRT NOTE ADULT - NSSBIRTALCPASSREFTXDET_GEN_A_CORE
Patient reports that she drinks approximately 1/2 liter of vodka daily for several years, getting worse in recent years. Patient states that her drinking has caused her to miss work and was previously let go from her job. Patient was previously attending an outpatient program at [facility], located on [address] [place] [address] [address], that consisted of 3x/week sessions with a counselor and doctor. SW offered to provide ETOH resources to patient, patient in agreement for outpatient resources. SW provided list of outpatient ETOH resources to patient.

## 2021-08-21 NOTE — DISCHARGE NOTE PROVIDER - NSDCCPCAREPLAN_GEN_ALL_CORE_FT
PRINCIPAL DISCHARGE DIAGNOSIS  Diagnosis: Alcohol withdrawal  Assessment and Plan of Treatment:       SECONDARY DISCHARGE DIAGNOSES  Diagnosis: Hypertension  Assessment and Plan of Treatment: Hypertension is the medical term for high blood pressure. Blood pressure refers to the pressure that blood applies to the inner walls of the arteries. Arteries carry blood from the heart to other organs and parts of the body. Untreated high blood pressure increases the strain on the heart and arteries, eventually causing organ damage. High blood pressure increases the risk of heart failure, heart attack (myocardial infarction), stroke, and kidney failure. High blood pressure does not usually cause any symptoms. Treatment of hypertension usually begins with lifestyle changes. Making these lifestyle changes involves little or no risk. Recommended changes often include reducing the amount of salt in your diet, losing weight if you are overweight or obese, avoiding drinking too much alcohol, stopping smoking and exercising at least 30 minutes per day most days of the week. If you are prescribed medication for your hypertension it is important to take these as prescribed to prevent the possible complications of uncontrolled hypertension.     PRINCIPAL DISCHARGE DIAGNOSIS  Diagnosis: Alcohol withdrawal  Assessment and Plan of Treatment: You have a history of alcohol dependence or alcoholism. With alcohol dependence, you drink alcohol too much and too often for a longer period of time. Common symptoms of alcohol withdrawal include shaking, throwing up, sweating, and anxiety. Please follow up closely with your PCP to be properly monitored and treated. Please seek care immediately or call 911 if you have a convulsion, trouble breathing, chest pain, fast heartbeat, and feel like hurting yourself or someone else.        SECONDARY DISCHARGE DIAGNOSES  Diagnosis: Hypertension  Assessment and Plan of Treatment: Hypertension is the medical term for high blood pressure. Blood pressure refers to the pressure that blood applies to the inner walls of the arteries. Arteries carry blood from the heart to other organs and parts of the body. Untreated high blood pressure increases the strain on the heart and arteries, eventually causing organ damage. High blood pressure increases the risk of heart failure, heart attack (myocardial infarction), stroke, and kidney failure. High blood pressure does not usually cause any symptoms. Treatment of hypertension usually begins with lifestyle changes. Making these lifestyle changes involves little or no risk. Recommended changes often include reducing the amount of salt in your diet, losing weight if you are overweight or obese, avoiding drinking too much alcohol, stopping smoking and exercising at least 30 minutes per day most days of the week. If you are prescribed medication for your hypertension it is important to take these as prescribed to prevent the possible complications of uncontrolled hypertension.

## 2021-08-21 NOTE — H&P ADULT - PROBLEM SELECTOR PLAN 4
F: s/p 2 L NS, on d5NS @110cc/hr  E: replete aggressively  Diet: regular  DVT: lovenox  Dispo: F Patient has history of alcohol use disorder with multiple prior admissions for withdrawal, currently admitted after a 3 week binge of one bottle of whiskey per 2 days. Reports wanting to quit, had previously been sober for 1.5 years prior to this binge. Follows with outpatient facility  -plan per "alcohol withdrawal"

## 2021-08-21 NOTE — DISCHARGE NOTE PROVIDER - CARE PROVIDER_API CALL
Renetta Hdz)  Internal Medicine  [address] [place] [address]  Phone: [phone]  Fax: [phone]  Follow Up Time:    Renetta Hdz)  Internal Medicine  [address] [place] [address]  Phone: [phone]  Fax: [phone]  Scheduled Appointment: 09/02/2021 12:30 PM

## 2021-08-21 NOTE — H&P ADULT - PROBLEM SELECTOR PLAN 1
Patient presents with tremors, Patient with multiple prior admission for alcohol withdrawal presents with tremors, palpitations, report of recent alcohol binge, found to be tachycardic, elevated blood alcohol level, anion gap, decreased bicarb, admitted for treatment of alcohol withdrawal. Patient continues to be tachycardic, mentating well, alert and oriented x3, no agitation.  -continue ativan 2mg PO q4h, taper as indicated  -ativan IV push 1mg for ciwa greater than 8  -ciwa q8h  -thiamine 250mg IV, folic acid, and multivitamin

## 2021-08-21 NOTE — H&P ADULT - NSHPPHYSICALEXAM_GEN_ALL_CORE
VITAL SIGNS:  T(C): 36.6 (08-21-21 @ 01:15), Max: 37.1 (08-21-21 @ 00:38)  T(F): 97.8 (08-21-21 @ 01:15), Max: 98.7 (08-21-21 @ 00:38)  HR: 118 (08-21-21 @ 01:15) (90 - 122)  BP: 126/74 (08-21-21 @ 01:15) (126/74 - 145/90)  BP(mean): --  RR: 18 (08-21-21 @ 01:15) (18 - 18)  SpO2: 95% (08-21-21 @ 01:15) (95% - 98%)  Wt(kg): --    PHYSICAL EXAM:    Constitutional: resting comfortably in bed; NAD  Eyes: anicteric sclera  ENT: no nasal discharge; MMM  Neck: supple;  Respiratory: CTA B/L; no W/R/R, no retractions  Cardiac: +S1/S2; RRR; no M/R/G;  Gastrointestinal: soft, NT/ND; no rebound or guarding;  Genitourinary: normal external genitalia  Extremities: WWP, no clubbing or cyanosis; no peripheral edema  Neurologic: AAOx3; CNII-XII grossly intact; no focal deficits  Psychiatric: affect and characteristics of appearance, verbalizations, behaviors are appropriate    CIWA: 4

## 2021-08-21 NOTE — DISCHARGE NOTE PROVIDER - CARE PROVIDERS DIRECT ADDRESSES
[email or web address]

## 2021-08-22 LAB
ANION GAP SERPL CALC-SCNC: 11 MMOL/L — SIGNIFICANT CHANGE UP (ref 5–17)
BASOPHILS # BLD AUTO: 0.07 K/UL — SIGNIFICANT CHANGE UP (ref 0–0.2)
BASOPHILS NFR BLD AUTO: 0.9 % — SIGNIFICANT CHANGE UP (ref 0–2)
BUN SERPL-MCNC: 3 MG/DL — LOW (ref 7–23)
CALCIUM SERPL-MCNC: 9.3 MG/DL — SIGNIFICANT CHANGE UP (ref 8.4–10.5)
CHLORIDE SERPL-SCNC: 101 MMOL/L — SIGNIFICANT CHANGE UP (ref 96–108)
CO2 SERPL-SCNC: 26 MMOL/L — SIGNIFICANT CHANGE UP (ref 22–31)
CREAT SERPL-MCNC: 0.52 MG/DL — SIGNIFICANT CHANGE UP (ref 0.5–1.3)
EOSINOPHIL # BLD AUTO: 0.26 K/UL — SIGNIFICANT CHANGE UP (ref 0–0.5)
EOSINOPHIL NFR BLD AUTO: 3.3 % — SIGNIFICANT CHANGE UP (ref 0–6)
GLUCOSE SERPL-MCNC: 90 MG/DL — SIGNIFICANT CHANGE UP (ref 70–99)
HCT VFR BLD CALC: 39.3 % — SIGNIFICANT CHANGE UP (ref 34.5–45)
HGB BLD-MCNC: 13.2 G/DL — SIGNIFICANT CHANGE UP (ref 11.5–15.5)
IMM GRANULOCYTES NFR BLD AUTO: 0.8 % — SIGNIFICANT CHANGE UP (ref 0–1.5)
LYMPHOCYTES # BLD AUTO: 1.73 K/UL — SIGNIFICANT CHANGE UP (ref 1–3.3)
LYMPHOCYTES # BLD AUTO: 22 % — SIGNIFICANT CHANGE UP (ref 13–44)
MAGNESIUM SERPL-MCNC: 2 MG/DL — SIGNIFICANT CHANGE UP (ref 1.6–2.6)
MCHC RBC-ENTMCNC: 32.4 PG — SIGNIFICANT CHANGE UP (ref 27–34)
MCHC RBC-ENTMCNC: 33.6 GM/DL — SIGNIFICANT CHANGE UP (ref 32–36)
MCV RBC AUTO: 96.3 FL — SIGNIFICANT CHANGE UP (ref 80–100)
MONOCYTES # BLD AUTO: 0.7 K/UL — SIGNIFICANT CHANGE UP (ref 0–0.9)
MONOCYTES NFR BLD AUTO: 8.9 % — SIGNIFICANT CHANGE UP (ref 2–14)
NEUTROPHILS # BLD AUTO: 5.06 K/UL — SIGNIFICANT CHANGE UP (ref 1.8–7.4)
NEUTROPHILS NFR BLD AUTO: 64.1 % — SIGNIFICANT CHANGE UP (ref 43–77)
NRBC # BLD: 0 /100 WBCS — SIGNIFICANT CHANGE UP (ref 0–0)
PHOSPHATE SERPL-MCNC: 3.8 MG/DL — SIGNIFICANT CHANGE UP (ref 2.5–4.5)
PLATELET # BLD AUTO: 158 K/UL — SIGNIFICANT CHANGE UP (ref 150–400)
POTASSIUM SERPL-MCNC: 3.5 MMOL/L — SIGNIFICANT CHANGE UP (ref 3.5–5.3)
POTASSIUM SERPL-SCNC: 3.5 MMOL/L — SIGNIFICANT CHANGE UP (ref 3.5–5.3)
RBC # BLD: 4.08 M/UL — SIGNIFICANT CHANGE UP (ref 3.8–5.2)
RBC # FLD: 13 % — SIGNIFICANT CHANGE UP (ref 10.3–14.5)
SODIUM SERPL-SCNC: 138 MMOL/L — SIGNIFICANT CHANGE UP (ref 135–145)
WBC # BLD: 7.88 K/UL — SIGNIFICANT CHANGE UP (ref 3.8–10.5)
WBC # FLD AUTO: 7.88 K/UL — SIGNIFICANT CHANGE UP (ref 3.8–10.5)

## 2021-08-22 PROCEDURE — 99233 SBSQ HOSP IP/OBS HIGH 50: CPT | Mod: GC

## 2021-08-22 RX ADMIN — Medication 2 MILLIGRAM(S): at 02:20

## 2021-08-22 RX ADMIN — Medication 1 MILLIGRAM(S): at 10:07

## 2021-08-22 RX ADMIN — Medication 1 TABLET(S): at 10:07

## 2021-08-22 RX ADMIN — Medication 100 MILLIGRAM(S): at 10:07

## 2021-08-22 RX ADMIN — Medication 2 MILLIGRAM(S): at 22:38

## 2021-08-22 RX ADMIN — Medication 2 MILLIGRAM(S): at 10:01

## 2021-08-22 NOTE — PROGRESS NOTE ADULT - SUBJECTIVE AND OBJECTIVE BOX
Patient is a 45y old  Female who presents with a chief complaint of ETOH w/d (21 Aug 2021 15:42)      INTERVAL HPI/OVERNIGHT EVENTS:  Patient was seen and examined at bedside. As per nurse and patient, no o/n events, patient resting comfortably. Notes that on the inside she feels better overall. However when she gets up to walk around, she still feels weak and her legs feel like they are shaking. She doesn't feel as though she has enough balance to get around and is nervous to go home. Patient denies: fever, chills, dizziness, weakness, HA, Changes in vision, CP, palpitations, SOB, cough, N/V/D/C, dysuria, changes in bowel movements, LE edema.      PAST MEDICAL & SURGICAL HISTORY:  Alcohol abuse    Hypertension    S/P wrist surgery      T(C): 36.9 (08-22-21 @ 10:04), Max: 36.9 (08-21-21 @ 16:50)  HR: 115 (08-22-21 @ 10:04) (97 - 115)  BP: 146/91 (08-22-21 @ 10:04) (139/94 - 152/97)  RR: 18 (08-22-21 @ 10:04) (18 - 18)  SpO2: 98% (08-22-21 @ 10:04) (96% - 99%)  Wt(kg): --  I&O's Summary      PHYSICAL EXAM:  GENERAL: NAD, laying comfortably in bed  HEAD:  Atraumatic, Normocephalic  EYES: EOMI, PERRLA, conjunctiva and sclera clear  ENMT: No tonsillar erythema, exudates, or enlargement; MMM  NECK: Supple, No JVD  NERVOUS SYSTEM:  Alert & Oriented X3, no focal deficits   CHEST/LUNG: Clear to percussion bilaterally; No rales, rhonchi, wheezing, or rubs  HEART: Regular rate and rhythm; No murmurs, rubs, or gallops  ABDOMEN: Soft, Nontender, Nondistended; Bowel sounds present  EXTREMITIES:  2+ Peripheral Pulses, No clubbing, cyanosis, or edema  LYMPH: No lymphadenopathy noted  SKIN: No rashes or lesions        LABS:                        13.2   7.88  )-----------( 158      ( 22 Aug 2021 06:54 )             39.3     08-22    138  |  101  |  3<L>  ----------------------------<  90  3.5   |  26  |  0.52    Ca    9.3      22 Aug 2021 06:54  Phos  3.8     08-22  Mg     2.0     08-22    TPro  7.2  /  Alb  4.5  /  TBili  0.9  /  DBili  x   /  AST  91<H>  /  ALT  77<H>  /  AlkPhos  71  08-20      Urinalysis Basic - ( 21 Aug 2021 00:05 )    Color: x / Appearance: x / SG: x / pH: x  Gluc: x / Ketone: x  / Bili: x / Urobili: x   Blood: x / Protein: x / Nitrite: x   Leuk Esterase: x / RBC: < 5 /HPF / WBC < 5 /HPF   Sq Epi: x / Non Sq Epi: 0-5 /HPF / Bacteria: Present /HPF      CAPILLARY BLOOD GLUCOSE            Urinalysis Basic - ( 21 Aug 2021 00:05 )    Color: x / Appearance: x / SG: x / pH: x  Gluc: x / Ketone: x  / Bili: x / Urobili: x   Blood: x / Protein: x / Nitrite: x   Leuk Esterase: x / RBC: < 5 /HPF / WBC < 5 /HPF   Sq Epi: x / Non Sq Epi: 0-5 /HPF / Bacteria: Present /HPF        MEDICATIONS  (STANDING):  folic acid 1 milliGRAM(s) Oral daily  LORazepam     Tablet 2 milliGRAM(s) Oral every 8 hours  multivitamin 1 Tablet(s) Oral daily  thiamine 100 milliGRAM(s) Oral daily    MEDICATIONS  (PRN):  acetaminophen   Tablet .. 650 milliGRAM(s) Oral every 6 hours PRN Temp greater or equal to 38.5C (101.3F), Mild Pain (1 - 3)  LORazepam   Injectable 1 milliGRAM(s) IV Push every 1 hour PRN CIWA-Ar score 8 or greater  melatonin 3 milliGRAM(s) Oral at bedtime PRN Insomnia      RADIOLOGY & ADDITIONAL TESTS:    Imaging Personally Reviewed:  [ ] YES  [ ] NO    Consultant(s) Notes Reviewed:  [ ] YES  [ ] NO    Care Discussed with Consultants/Other Providers [ ] YES  [ ] NO

## 2021-08-23 ENCOUNTER — TRANSCRIPTION ENCOUNTER (OUTPATIENT)
Age: 46
End: 2021-08-23

## 2021-08-23 VITALS
RESPIRATION RATE: 18 BRPM | SYSTOLIC BLOOD PRESSURE: 138 MMHG | DIASTOLIC BLOOD PRESSURE: 95 MMHG | OXYGEN SATURATION: 98 % | HEART RATE: 98 BPM | TEMPERATURE: 98 F

## 2021-08-23 PROCEDURE — 80307 DRUG TEST PRSMV CHEM ANLYZR: CPT

## 2021-08-23 PROCEDURE — 84702 CHORIONIC GONADOTROPIN TEST: CPT

## 2021-08-23 PROCEDURE — 99285 EMERGENCY DEPT VISIT HI MDM: CPT | Mod: 25

## 2021-08-23 PROCEDURE — 81001 URINALYSIS AUTO W/SCOPE: CPT

## 2021-08-23 PROCEDURE — 87635 SARS-COV-2 COVID-19 AMP PRB: CPT

## 2021-08-23 PROCEDURE — 82803 BLOOD GASES ANY COMBINATION: CPT

## 2021-08-23 PROCEDURE — 84100 ASSAY OF PHOSPHORUS: CPT

## 2021-08-23 PROCEDURE — 80048 BASIC METABOLIC PNL TOTAL CA: CPT

## 2021-08-23 PROCEDURE — 84132 ASSAY OF SERUM POTASSIUM: CPT

## 2021-08-23 PROCEDURE — 82010 KETONE BODYS QUAN: CPT

## 2021-08-23 PROCEDURE — 82330 ASSAY OF CALCIUM: CPT

## 2021-08-23 PROCEDURE — 84295 ASSAY OF SERUM SODIUM: CPT

## 2021-08-23 PROCEDURE — 83605 ASSAY OF LACTIC ACID: CPT

## 2021-08-23 PROCEDURE — 80053 COMPREHEN METABOLIC PANEL: CPT

## 2021-08-23 PROCEDURE — 83930 ASSAY OF BLOOD OSMOLALITY: CPT

## 2021-08-23 PROCEDURE — 99239 HOSP IP/OBS DSCHRG MGMT >30: CPT | Mod: GC

## 2021-08-23 PROCEDURE — 85027 COMPLETE CBC AUTOMATED: CPT

## 2021-08-23 PROCEDURE — 85025 COMPLETE CBC W/AUTO DIFF WBC: CPT

## 2021-08-23 PROCEDURE — 36415 COLL VENOUS BLD VENIPUNCTURE: CPT

## 2021-08-23 PROCEDURE — 86769 SARS-COV-2 COVID-19 ANTIBODY: CPT

## 2021-08-23 PROCEDURE — 83735 ASSAY OF MAGNESIUM: CPT

## 2021-08-23 RX ORDER — DULOXETINE HYDROCHLORIDE 30 MG/1
1 CAPSULE, DELAYED RELEASE ORAL
Qty: 21 | Refills: 1
Start: 2021-08-23 | End: 2021-10-03

## 2021-08-23 RX ADMIN — Medication 2 MILLIGRAM(S): at 10:04

## 2021-08-23 RX ADMIN — Medication 1 MILLIGRAM(S): at 11:51

## 2021-08-23 RX ADMIN — Medication 100 MILLIGRAM(S): at 11:52

## 2021-08-23 RX ADMIN — Medication 1 TABLET(S): at 11:51

## 2021-08-23 NOTE — DISCHARGE NOTE NURSING/CASE MANAGEMENT/SOCIAL WORK - PATIENT PORTAL LINK FT
You can access the FollowMyHealth Patient Portal offered by [facility] by registering at the following website: [email or web address]. By joining [facility]’s FollowMyHealth portal, you will also be able to view your health information using other applications (apps) compatible with our system.

## 2021-08-23 NOTE — DISCHARGE NOTE NURSING/CASE MANAGEMENT/SOCIAL WORK - NSDCPEFALRISK_GEN_ALL_CORE
For information on Fall & injury Prevention, visit [email or web address]

## 2021-08-23 NOTE — PROGRESS NOTE ADULT - PROBLEM SELECTOR PROBLEM 2
High anion gap metabolic acidosis

## 2021-08-23 NOTE — PROGRESS NOTE ADULT - PROBLEM SELECTOR PLAN 4
Patient has history of alcohol use disorder with multiple prior admissions for withdrawal, currently admitted after a 3 week binge of one bottle of whiskey per 2 days. Reports wanting to quit, had previously been sober for 1.5 years prior to this binge. Follows with outpatient facility  -plan per "alcohol withdrawal"
Patient has history of alcohol use disorder with multiple prior admissions for withdrawal, currently admitted after a 3 week binge of one bottle of whiskey per 2 days. Reports wanting to quit, had previously been sober for 1.5 years prior to this binge. Follows with outpatient facility  -plan per "alcohol withdrawal"
Patient has history of alcohol use disorder with multiple prior admissions for withdrawal, currently admitted after a 3 week binge of one bottle of whiskey per 2 days. Reports wanting to quit, had previously been sober for 1.5 years prior to this binge. Follows with outpatient facility  -plan per "alcohol withdrawal"    #thrombocytopenia   Likely 2/2 etoh use   no e/o active bleeding   has since resolved

## 2021-08-23 NOTE — DISCHARGE NOTE NURSING/CASE MANAGEMENT/SOCIAL WORK - NSDCFUADDAPPT_GEN_ALL_CORE_FT
Please bring your Insurance card, Photo ID, Covid-19 vaccination card (if applicable) and Discharge paperwork to the following appointment:    (1) Please follow up with Dr. Suha Guerrero on behalf of Dr. Renetta Hdz at the [facility] located at [address] [place] [address], [address] [place] [address] on 09/02/2021 at 12:30pm.    Appointment was scheduled by Ms. DANILO Gutierrez, Referral Coordinator.

## 2021-08-23 NOTE — PROGRESS NOTE ADULT - PROBLEM SELECTOR PLAN 3
RESOLVED  Continue to encourage PO intake    #Elevated Lactate  Patient arrived with elevated lactate levels 4.8  -Patient received 1LNS and d5NS @110cc/hr x 12 hours  -repeat lactate cleared 0.9
ADDENDUM: cause unclear, monitor BMP, check urine lytes/ osm  On admission with Na of 133.  - repeat Na 135, electrolytes wnl  RESOLVED    #Elevated Lactate  Patient arrived with elevated lactate levels 4.8  -Patient received 1LNS and d5NS @110cc/hr x 12 hours  -repeat lactate cleared 0.9  RESOLVED    RESOVED
ADDENDUM: cause unclear, monitor BMP, check urine lytes/ osm  On admission with Na of 133.  - repeat Na 135, electrolytes wnl  RESOLVED    #Elevated Lactate  Patient arrived with elevated lactate levels 4.8  -Patient received 1LNS and d5NS @110cc/hr x 12 hours  -repeat lactate cleared 0.9    RESOVED

## 2021-08-23 NOTE — PROGRESS NOTE ADULT - PROBLEM SELECTOR PLAN 1
Patient with multiple prior admission for alcohol withdrawal presents with tremors, palpitations, report of recent alcohol binge, found to be tachycardic, elevated blood alcohol level, anion gap, decreased bicarb, admitted for treatment of alcohol withdrawal. Patient continues to be tachycardic, mentating well, alert and oriented x3, no agitation.  -repeat CIWA 0  -d/c'd ativan as patient endorsed unsteady gait.   -withdrawal protocol not needed  -ativan IV push 1mg for ciwa greater than 8  -ciwa q8h  RESOLVED
Patient with multiple prior admission for alcohol withdrawal presents with tremors, palpitations, report of recent alcohol binge, found to be tachycardic, elevated blood alcohol level, anion gap, decreased bicarb, admitted for treatment of alcohol withdrawal. Patient continues to be tachycardic, mentating well, alert and oriented x3, no agitation.  Plan:  -repeat CIWA 3 (+1 tremors, +2 headache/fullness) @ 10:30AM, patient is currently in NAD.  -continue ativan 2mg PO q4h, taper as indicated  -ativan IV push 1mg for ciwa greater than 8  -ciwa q8h  -thiamine 250mg IV, folic acid, and multivitamin
CW Ciwa protocol  Current CIWA 0, tremors and headache resolved   Taper ativan from 2mg q8hr to [id number]  -ativan IV push 1mg for ciwa greater than 8  -ciwa q8h  -CW thiamine, folic acid, and multivitamin

## 2021-08-23 NOTE — PROGRESS NOTE ADULT - SUBJECTIVE AND OBJECTIVE BOX
** INCOMPLETE **    OVERNIGHT EVENTS:     SUBJECTIVE:    VITAL SIGNS:  Vital Signs Last 24 Hrs  T(C): 36.7 (23 Aug 2021 09:17), Max: 37.2 (22 Aug 2021 16:25)  T(F): 98 (23 Aug 2021 09:17), Max: 99 (22 Aug 2021 16:25)  HR: 98 (23 Aug 2021 09:17) (97 - 114)  BP: 138/95 (23 Aug 2021 09:17) (138/95 - 151/100)  BP(mean): --  RR: 18 (23 Aug 2021 09:17) (17 - 18)  SpO2: 98% (23 Aug 2021 09:17) (97% - 100%)    PHYSICAL EXAM:  General: NAD; speaking in full sentences  HEENT: NC/AT; PERRL; EOMI; MMM  Neck: supple; no JVD  Cardiac: RRR; +S1/S2  Pulm: CTA B/L; no W/R/R  GI: soft, NT/ND, +BS  Extremities: WWP; no edema, clubbing or cyanosis  Vasc: 2+ radial, DP pulses B/L  Neuro: AAOx3; no focal deficits    MEDICATIONS:  MEDICATIONS  (STANDING):  folic acid 1 milliGRAM(s) Oral daily  multivitamin 1 Tablet(s) Oral daily  thiamine 100 milliGRAM(s) Oral daily    MEDICATIONS  (PRN):  acetaminophen   Tablet .. 650 milliGRAM(s) Oral every 6 hours PRN Temp greater or equal to 38.5C (101.3F), Mild Pain (1 - 3)  LORazepam   Injectable 1 milliGRAM(s) IV Push every 1 hour PRN CIWA-Ar score 8 or greater  melatonin 3 milliGRAM(s) Oral at bedtime PRN Insomnia      ALLERGIES:  Allergies    predniSONE (Rash)  Tamiflu (Other)    Intolerances        LABS:                        13.2   7.88  )-----------( 158      ( 22 Aug 2021 06:54 )             39.3     08-22    138  |  101  |  3<L>  ----------------------------<  90  3.5   |  26  |  0.52    Ca    9.3      22 Aug 2021 06:54  Phos  3.8     08-22  Mg     2.0     08-22          RADIOLOGY & ADDITIONAL TESTS: Reviewed. OVERNIGHT EVENTS:   No acute events overnight.    SUBJECTIVE:  Patient seen and examined at bedside, resting comfortably in bed, and does not appear to be in any acute distress. Patient states her unsteady gait has improved since yesterday. Patient was able to walk to the bathroom better today. Repeat CIWA 0 today. Patient advised against alcohol use given risks associated with intoxication and withdrawal. Patient verbally understands and agrees to abstain. Patient denied AA and other resources as they actually prone her to alcohol use. Patient denies any recent or active fever, chills, nausea, vomiting, headache, acute sob, chest pain, abdominal pain, genitourinary sx, extremity pain or swelling.     VITAL SIGNS:  Vital Signs Last 24 Hrs  T(C): 36.7 (23 Aug 2021 09:17), Max: 37.2 (22 Aug 2021 16:25)  T(F): 98 (23 Aug 2021 09:17), Max: 99 (22 Aug 2021 16:25)  HR: 98 (23 Aug 2021 09:17) (97 - 114)  BP: 138/95 (23 Aug 2021 09:17) (138/95 - 151/100)  BP(mean): --  RR: 18 (23 Aug 2021 09:17) (17 - 18)  SpO2: 98% (23 Aug 2021 09:17) (97% - 100%)    PHYSICAL EXAM:  General: NAD; speaking in full sentences, AAOx3, CIWA 0  HEENT: NC/AT; PERRL; EOMI; MMM,   Neck: supple; no JVD  Cardiac: RRR; +S1/S2, No MGR  Pulm: CTA B/L; no W/R/R  GI: soft, NT/ND, +BSx4, no HSM, no guarding or rebound tenderness  Extremities: WWP; no edema, clubbing or cyanosis,  Vasc: 2+ radial, DP pulses B/L  Neuro: AAOx3; no focal deficits, sensation grossly intact, strength 5/5 throughout     MEDICATIONS:  MEDICATIONS  (STANDING):  folic acid 1 milliGRAM(s) Oral daily  multivitamin 1 Tablet(s) Oral daily  thiamine 100 milliGRAM(s) Oral daily    MEDICATIONS  (PRN):  acetaminophen   Tablet .. 650 milliGRAM(s) Oral every 6 hours PRN Temp greater or equal to 38.5C (101.3F), Mild Pain (1 - 3)  LORazepam   Injectable 1 milliGRAM(s) IV Push every 1 hour PRN CIWA-Ar score 8 or greater  melatonin 3 milliGRAM(s) Oral at bedtime PRN Insomnia      ALLERGIES:  Allergies    predniSONE (Rash)  Tamiflu (Other)    Intolerances      LABS:                        13.2   7.88  )-----------( 158      ( 22 Aug 2021 06:54 )             39.3     08-22    138  |  101  |  3<L>  ----------------------------<  90  3.5   |  26  |  0.52    Ca    9.3      22 Aug 2021 06:54  Phos  3.8     08-22  Mg     2.0     08-22      RADIOLOGY & ADDITIONAL TESTS: Reviewed.

## 2021-08-23 NOTE — PROGRESS NOTE ADULT - PROBLEM SELECTOR PLAN 2
Patient presents with anion gap of 24, bicarb of 21, Cl 92, glucose of 100, admitted for management of ketoacidosis, likely secondary to alcoholic starvation ketosis given report of 3 week alcohol binge. pH 7.39 on VBG. Lactate and BHB drawn after 1L of NS given by ED were elevated to 4.8 and 3.1, respectively. Subsequent BMP shows improvement of AG to 18. UA positive for ketones  - AG closed x1 ~11  - BHB downtrending to 1.3  - d/c d5NS @110cc/hr as patient is tolerating PO intake  RESOLVED
Patient presents with anion gap of 24, bicarb of 21, Cl 92, glucose of 100, admitted for management of ketoacidosis, likely secondary to alcoholic starvation ketosis given report of 3 week alcohol binge. pH 7.39 on VBG. Lactate and BHB drawn after 1L of NS given by ED were elevated to 4.8 and 3.1, respectively. Subsequent BMP shows improvement of AG to 18. UA positive for ketones  - AG closed x1 ~11  - BHB downtrending to 1.3  - d/c d5NS @110cc/hr as patient is tolerating PO intake  RESOLVED
RESOLVED  Continue to encourage PO intake  Replete K>4

## 2021-08-26 ENCOUNTER — NON-APPOINTMENT (OUTPATIENT)
Age: 46
End: 2021-08-26

## 2021-08-26 DIAGNOSIS — E87.2 ACIDOSIS: ICD-10-CM

## 2021-08-26 DIAGNOSIS — I10 ESSENTIAL (PRIMARY) HYPERTENSION: ICD-10-CM

## 2021-08-26 DIAGNOSIS — E87.1 HYPO-OSMOLALITY AND HYPONATREMIA: ICD-10-CM

## 2021-08-26 DIAGNOSIS — Z88.8 ALLERGY STATUS TO OTHER DRUGS, MEDICAMENTS AND BIOLOGICAL SUBSTANCES: ICD-10-CM

## 2021-08-26 DIAGNOSIS — D69.6 THROMBOCYTOPENIA, UNSPECIFIED: ICD-10-CM

## 2021-08-26 DIAGNOSIS — F10.130 ALCOHOL ABUSE WITH WITHDRAWAL, UNCOMPLICATED: ICD-10-CM

## 2021-08-31 DIAGNOSIS — Z71.89 OTHER SPECIFIED COUNSELING: ICD-10-CM

## 2021-09-02 ENCOUNTER — NON-APPOINTMENT (OUTPATIENT)
Age: 46
End: 2021-09-02

## 2021-09-02 ENCOUNTER — APPOINTMENT (OUTPATIENT)
Dept: INTERNAL MEDICINE | Facility: CLINIC | Age: 46
End: 2021-09-02
Payer: MEDICAID

## 2021-09-02 VITALS
BODY MASS INDEX: 24.99 KG/M2 | RESPIRATION RATE: 18 BRPM | SYSTOLIC BLOOD PRESSURE: 130 MMHG | WEIGHT: 155.5 LBS | DIASTOLIC BLOOD PRESSURE: 86 MMHG | OXYGEN SATURATION: 100 % | TEMPERATURE: 96.8 F | HEART RATE: 94 BPM | HEIGHT: 66 IN

## 2021-09-02 DIAGNOSIS — Z80.3 FAMILY HISTORY OF MALIGNANT NEOPLASM OF BREAST: ICD-10-CM

## 2021-09-02 DIAGNOSIS — Z82.62 FAMILY HISTORY OF OSTEOPOROSIS: ICD-10-CM

## 2021-09-02 DIAGNOSIS — Z87.898 PERSONAL HISTORY OF OTHER SPECIFIED CONDITIONS: ICD-10-CM

## 2021-09-02 DIAGNOSIS — Z09 ENCOUNTER FOR FOLLOW-UP EXAMINATION AFTER COMPLETED TREATMENT FOR CONDITIONS OTHER THAN MALIGNANT NEOPLASM: ICD-10-CM

## 2021-09-02 DIAGNOSIS — F10.11 ALCOHOL ABUSE, IN REMISSION: ICD-10-CM

## 2021-09-02 DIAGNOSIS — Z86.79 PERSONAL HISTORY OF OTHER DISEASES OF THE CIRCULATORY SYSTEM: ICD-10-CM

## 2021-09-02 DIAGNOSIS — Z23 ENCOUNTER FOR IMMUNIZATION: ICD-10-CM

## 2021-09-02 DIAGNOSIS — Z82.5 FAMILY HISTORY OF ASTHMA AND OTHER CHRONIC LOWER RESPIRATORY DISEASES: ICD-10-CM

## 2021-09-02 PROCEDURE — 99396 PREV VISIT EST AGE 40-64: CPT | Mod: 25,GC

## 2021-09-02 PROCEDURE — 99386 PREV VISIT NEW AGE 40-64: CPT | Mod: 25,GC

## 2021-09-02 PROCEDURE — 99495 TRANSJ CARE MGMT MOD F2F 14D: CPT

## 2021-09-02 RX ORDER — NALTREXONE HYDROCHLORIDE 50 MG/1
50 TABLET, FILM COATED ORAL
Refills: 0 | Status: ACTIVE | COMMUNITY

## 2021-09-02 NOTE — PAST MEDICAL HISTORY
[Menstruating] : menstruating [Approximately ___] : the LMP was approximately [unfilled] [Normal Amount/Duration] : it was of a normal amount and duration

## 2021-09-05 NOTE — ASSESSMENT
[FreeTextEntry1] : 45F PMHx of anxiety, insomnia, alcohol dependence d/o & w/d, HTN. Presents post-discharge from [facility] for alcohol w/d admission (from 8/20-23). Her history with alcohol use began 8yrs ago, which she self-medicated for insomnia, eventually up titrating to 0.5L rum per night within the last 2-3yrs to help her sleep. She has had 7 admissions to the medical floors and 3 ED visits since January 2019 for alcohol w/d. Her withdrawal symptoms mainly been autonomic (palpitations, HTN), she has never had seizures or psychosis. She has been sober since 8/20 when she was admitted. Since her discharge she has been seeing Dr. Hal Laird at [facility] at [address] She was started on Duloxetine 20mg and is back on Naltrexone, she also has group therapy sessions on W,M,F, with weekly urine alcohol monitoring.\par \par # Alcohol dependence d/o & w/d\par - alcohol use began 8yrs ago, which she self-medicated for insomnia, eventually up titrating to 0.5L rum per night within the last 2-3yrs to help her sleep\par - Had 7 admissions to the [facility] and 3 ED visits since January 2019 for alcohol w/d\par - Her withdrawal symptoms mainly been autonomic (palpitations, HTN), she has never had seizures or psychosis. - She has been sober since 8/20 when she was admitted\par - Since 8/23 discharge she has been seeing Dr. Hal Laird at [facility] at [address] - She was started on Duloxetine 20mg Qd and is back on Naltrexone, she also has group therapy sessions on W,M,F, with weekly urine alcohol monitoring\par - NTD for PCP at the moment\par \par # Insomnia\par - Hx of insomnia for 8yrs, melatonin was not effective, used to self-medicate with alcohol\par - In the past duloxetine was titrated 20 > 60mg Qd which was effective for insomnia but affected mood negatively, and was therefore stopped\par - Currently on duloxetine 20mg Qd, advised to discuss with her psychiatrist Dr. Hal Laird if titrating to 40mg Qd would be beneficial \par \par # Anxiety\par - On Duloxetine 20mg Qd as prescribed by her psychiatrist\par - DIANE 7 score 2 at this visit\par \par # Hx of HTN\par - was on amlodipine 2.5mg Qd from [id number]\par - BP at this visit was 146/90, rechecked to be 130/86\par - Patient claims some element of white coat hypertension\par [ ] monitor at next visit\par \par # HCM\par - Received Pfizer COVID vax: 04/03 & 04/24\par - Discussed DEXA scan & colonoscopy likely to be done at age 48\par - Current FRAX score 7.49% 10yr risk\par [ ] Referred for mammogram, f/u at next visit\par \par f/u in 3 months

## 2021-09-05 NOTE — HISTORY OF PRESENT ILLNESS
[FreeTextEntry1] : f/u post-discharge from [facility] for admission for alcohol w/d, and to set up new PCP [Post-hospitalization from ___ Hospital] : Post-hospitalization from [unfilled] Hospital [Discharged on ___] : discharged on [unfilled] [Discharge Summary] : discharge summary [Patient Contacted By: ____] : and contacted by [unfilled] [de-identified] : 45F PMHx of anxiety, insomnia, alcohol dependence d/o & w/d, HTN. Presents post-discharge from [facility] for alcohol w/d admission (from 8/20-23). Her history with alcohol use began 8yrs ago, which she self-medicated for insomnia, eventually up titrating to 0.5L rum per night within the last 2-3yrs to help her sleep. She has had 7 admissions to the medical floors and 3 ED visits since January 2019 for alcohol w/d. Her withdrawal symptoms mainly been autonomic (palpitations, HTN), she has never had seizures or psychosis. She has been sober since 8/20 when she was admitted. Since her discharge she has been seeing Dr. Hal Laird at [facility] at [address] She was started on Duloxetine 20mg and is back on Naltrexone, she also has group therapy sessions on W,M,F, with weekly urine alcohol monitoring.

## 2021-09-05 NOTE — END OF VISIT
[] : Resident [FreeTextEntry3] : Doing well with ETOH abstienence since hosp DC. \par Psychiatry f/u UTD. \par Insomnia/ sleep problems- encourage to discuss with her psych \par RTO 6 -12 wks for clinical f/u.

## 2021-09-16 ENCOUNTER — APPOINTMENT (OUTPATIENT)
Dept: MAMMOGRAPHY | Facility: HOSPITAL | Age: 46
End: 2021-09-16
Payer: MEDICAID

## 2021-09-16 ENCOUNTER — RESULT REVIEW (OUTPATIENT)
Age: 46
End: 2021-09-16

## 2021-09-16 ENCOUNTER — OUTPATIENT (OUTPATIENT)
Dept: OUTPATIENT SERVICES | Facility: HOSPITAL | Age: 46
LOS: 1 days | End: 2021-09-16
Payer: MEDICAID

## 2021-09-16 DIAGNOSIS — Z98.890 OTHER SPECIFIED POSTPROCEDURAL STATES: Chronic | ICD-10-CM

## 2021-09-16 PROCEDURE — 77067 SCR MAMMO BI INCL CAD: CPT

## 2021-09-16 PROCEDURE — 77063 BREAST TOMOSYNTHESIS BI: CPT

## 2021-09-16 PROCEDURE — 77063 BREAST TOMOSYNTHESIS BI: CPT | Mod: 26

## 2021-09-16 PROCEDURE — 77067 SCR MAMMO BI INCL CAD: CPT | Mod: 26

## 2021-09-24 ENCOUNTER — RESULT REVIEW (OUTPATIENT)
Age: 46
End: 2021-09-24

## 2021-09-24 ENCOUNTER — APPOINTMENT (OUTPATIENT)
Dept: MAMMOGRAPHY | Facility: HOSPITAL | Age: 46
End: 2021-09-24
Payer: MEDICAID

## 2021-09-24 ENCOUNTER — APPOINTMENT (OUTPATIENT)
Dept: ULTRASOUND IMAGING | Facility: HOSPITAL | Age: 46
End: 2021-09-24
Payer: MEDICAID

## 2021-09-24 ENCOUNTER — OUTPATIENT (OUTPATIENT)
Dept: OUTPATIENT SERVICES | Facility: HOSPITAL | Age: 46
LOS: 1 days | End: 2021-09-24
Payer: MEDICAID

## 2021-09-24 DIAGNOSIS — Z98.890 OTHER SPECIFIED POSTPROCEDURAL STATES: Chronic | ICD-10-CM

## 2021-09-24 PROCEDURE — 77066 DX MAMMO INCL CAD BI: CPT | Mod: 26

## 2021-09-24 PROCEDURE — 76641 ULTRASOUND BREAST COMPLETE: CPT | Mod: 26,50

## 2021-09-24 PROCEDURE — 76641 ULTRASOUND BREAST COMPLETE: CPT

## 2021-09-24 PROCEDURE — 77066 DX MAMMO INCL CAD BI: CPT

## 2021-10-07 NOTE — DISCHARGE NOTE PROVIDER - NSDCFUADDAPPT_GEN_ALL_CORE_FT
[phone] Please follow up with your Primary Physician, Dr. Pizano, on _____ @ _____. Please visit the ____ office.     Please follow up with Dr. Ordaz on ____ @ ____.    Please follow up with your outpatient substance abuse program.     If you need additional assistance you can make an appointment with our pharmacy team by calling [phone]. Please follow up with your Primary Physician, Dr. Pizano. His office was closed on day of discharge, so please call tomorrow to make an appointment. He will be happy to see you.    Please follow up with Dr. Ordaz. You said you wanted to make your own appointment.    Please follow up with your outpatient substance abuse program [facility].     If you need additional assistance you can make an appointment with our Psychiatry team by calling [phone]. Please follow up with your Primary Physician, Dr. Pizano on Sept 17 @ 1pm. If you need to see him sooner please call and ask for it to be changed    Please follow up with Dr. Ordaz. You said you wanted to make your own appointment.    Please follow up with your outpatient substance abuse program [facility].     If you need additional assistance you can make an appointment with our Psychiatry team by calling [phone]. no

## 2021-11-01 PROCEDURE — G9005: CPT

## 2021-12-02 ENCOUNTER — APPOINTMENT (OUTPATIENT)
Dept: INTERNAL MEDICINE | Facility: CLINIC | Age: 46
End: 2021-12-02
Payer: MEDICAID

## 2021-12-02 DIAGNOSIS — Z12.11 ENCOUNTER FOR SCREENING FOR MALIGNANT NEOPLASM OF COLON: ICD-10-CM

## 2021-12-02 DIAGNOSIS — Z12.39 ENCOUNTER FOR OTHER SCREENING FOR MALIGNANT NEOPLASM OF BREAST: ICD-10-CM

## 2021-12-02 DIAGNOSIS — F41.9 ANXIETY DISORDER, UNSPECIFIED: ICD-10-CM

## 2021-12-02 DIAGNOSIS — F10.20 ALCOHOL DEPENDENCE, UNCOMPLICATED: ICD-10-CM

## 2021-12-02 DIAGNOSIS — Z23 ENCOUNTER FOR IMMUNIZATION: ICD-10-CM

## 2021-12-02 PROCEDURE — 99213 OFFICE O/P EST LOW 20 MIN: CPT | Mod: GC,25

## 2021-12-02 PROCEDURE — G0009: CPT

## 2021-12-02 PROCEDURE — 90732 PPSV23 VACC 2 YRS+ SUBQ/IM: CPT

## 2021-12-03 NOTE — END OF VISIT
[FreeTextEntry3] : I saw and evaluated the patient.  The findings and assessment were discussed with the resident and I agree with resident’s plan as documented in the above, in the resident’s note.\par \par Pertinent exam findings: Well-appearing, NAD.\par \par ETOH dependence: Sober for > 1 month.  Cravings controlled w/ naltrexone.\par Anxiety w/ insomnia: C/w duloxetine.  Can consider trazodone in the future if necessary.\par BP okay today.\par PPSV23 today for EtOHism.

## 2021-12-03 NOTE — HISTORY OF PRESENT ILLNESS
[FreeTextEntry1] : f/u  [de-identified] : 46F PMHx of anxiety, insomnia, and alcohol dependence d/o & w/d presents for f/u. Since she has been d/c for ETOH withdrawal in Aug 21', she has had only one glass of wine and reports good compliance and f/u with [facility] withdrawal txt and naltrexone therapy. She does not have anhedonia and denies SI/HI/AH/VH. Her anxiety is well controlled on duloxetine therapy.

## 2021-12-03 NOTE — ASSESSMENT
[FreeTextEntry1] : 46F PMHx of anxiety, insomnia, and alcohol dependence d/o & w/d presents for f/u\par \par # Alcohol dependence d/o & w/d\par - She has been sober since 8/20\par - Since 8/23 she has been seeing Dr. Hal Laird at [facility] at [address] - She was started on Duloxetine 20mg Qd and is back on Naltrexone, she also has group therapy sessions on W,M,F, with weekly urine alcohol monitoring\par \par # Insomnia\par - Hx of insomnia for 8yrs, melatonin was not effective, used to self-medicate with alcohol\par - Well managed now off ETOH \par - C/w on duloxetine 20mg Qd\par \par # Anxiety\par - C/w Duloxetine 20mg Qd as prescribed by her psychiatrist\par - DIANE score of 3 on this visit \par \par # Hx of HTN (HTN)\par -likely 2/2 chronic ETOH, bp on this visit 122/84\par \par # HCM\par - Received Pfizer COVID vax: 04/03 & 04/24\par - s/p Influenza vaccine Nov 13, 2021\par - s/p PPSV23 on this visit \par - Discussed DEXA scan  likely to be done at age 48\par - Will refer for cscope on next visit pending insurance carrier change \par - Will refer for breast US in March 2022 pending insurance above\par - Current FRAX score 7.49% 10yr risk\par - RTC 6 months \par \par \par f/u in 3 months.

## 2021-12-09 NOTE — ED ADULT TRIAGE NOTE - TEMPERATURE IN FAHRENHEIT (DEGREES F)
Assuming care of patient. Patient resting in bed after returning from bathroom. Patient able to successfully void post delivery. Blood pressure elevated; denies any PIH symptoms currently. Plan of care discussed with patient; patient verbalizes understanding. Emotional support offered. Call light in reach. 98.3

## 2021-12-31 ENCOUNTER — TRANSCRIPTION ENCOUNTER (OUTPATIENT)
Age: 46
End: 2021-12-31

## 2022-01-02 NOTE — ED ADULT NURSE NOTE - ED STAT RN HAND OFF
Subjective:       Patient ID: Herman Floyd is a 50 y.o. male.    Vitals:  height is 6' (1.829 m) and weight is 139.3 kg (307 lb) (abnormal). His temperature is 98.5 °F (36.9 °C). His blood pressure is 132/86 and his pulse is 72. His respiration is 16 and oxygen saturation is 98%.     Chief Complaint: Cough    Pt is a 50 y.o. male presenting with covid concerns. Pt states he was directly exposed last Tuesday. Pt now has a cough that started yesterday. He is also having sweats and a sore throat. Pt fully vaccinated.  Pt was exposed 5 days prior  Having minimal cough    Cough  This is a new problem. The current episode started yesterday. Treatments tried: nel melendez. The treatment provided no relief.       Respiratory: Positive for cough.        Objective:      Physical Exam   Constitutional: He is oriented to person, place, and time. He appears well-developed and well-nourished. He is cooperative.  Non-toxic appearance. He does not appear ill. No distress.   HENT:   Head: Normocephalic and atraumatic.   Ears:   Right Ear: Hearing, tympanic membrane, external ear and ear canal normal.   Left Ear: Hearing, tympanic membrane, external ear and ear canal normal.   Nose: Nose normal. No mucosal edema, rhinorrhea or nasal deformity. No epistaxis. Right sinus exhibits no maxillary sinus tenderness and no frontal sinus tenderness. Left sinus exhibits no maxillary sinus tenderness and no frontal sinus tenderness.   Mouth/Throat: Uvula is midline, oropharynx is clear and moist and mucous membranes are normal. Mucous membranes are moist. No trismus in the jaw. Normal dentition. No uvula swelling. No oropharyngeal exudate. Oropharynx is clear.   Eyes: Conjunctivae and lids are normal. Pupils are equal, round, and reactive to light. Right eye exhibits no discharge. Left eye exhibits no discharge. No scleral icterus.      extraocular movement intact   Neck: Trachea normal and phonation normal. Neck supple.   Cardiovascular:  Normal rate, regular rhythm, normal heart sounds, intact distal pulses and normal pulses.   Pulmonary/Chest: Effort normal and breath sounds normal. No respiratory distress.   Abdominal: Normal appearance and bowel sounds are normal. He exhibits no distension, no pulsatile midline mass and no mass. Soft. There is no abdominal tenderness.   Musculoskeletal: Normal range of motion.         General: No deformity or edema. Normal range of motion.   Neurological: He is alert and oriented to person, place, and time. He exhibits normal muscle tone. Coordination normal.   Skin: Skin is warm, dry, intact, not diaphoretic and not pale.   Psychiatric: He has a normal mood and affect. His speech is normal and behavior is normal. Judgment and thought content normal. Cognition and memory  Nursing note and vitals reviewed.        Assessment:       1. Encounter for laboratory testing for COVID-19 virus    2. Lab test positive for detection of COVID-19 virus          Plan:         Encounter for laboratory testing for COVID-19 virus  -     POCT COVID-19 Rapid Screening    Lab test positive for detection of COVID-19 virus            Results for orders placed or performed in visit on 01/02/22   POCT COVID-19 Rapid Screening   Result Value Ref Range    POC Rapid COVID Positive (A) Negative     Acceptable Yes                 Handoff

## 2022-01-13 NOTE — PATIENT PROFILE ADULT - NSPROMEDSBROUGHTTOHOSP_GEN_A_NUR
"Chief Complaint   Patient presents with     Finger     possible for infection       Initial BP (!) 156/92  Pulse 71  Temp 97.8  F (36.6  C) (Oral)  Wt 136 lb 3.2 oz (61.8 kg)  SpO2 96%  BMI 21.33 kg/m2 Estimated body mass index is 21.33 kg/(m^2) as calculated from the following:    Height as of 2/14/17: 5' 7\" (1.702 m).    Weight as of this encounter: 136 lb 3.2 oz (61.8 kg).  Medication Reconciliation: complete   Bozena Gutiérrez MA        " 3CN for telemetry yes

## 2022-02-15 ENCOUNTER — TRANSCRIPTION ENCOUNTER (OUTPATIENT)
Age: 47
End: 2022-02-15

## 2022-04-19 NOTE — ED PROVIDER NOTE - CPE EDP ENMT NORM
normal... Modified Advancement Flap Text: The defect edges were debeveled with a #15 scalpel blade.  Given the location of the defect, shape of the defect and the proximity to free margins a modified advancement flap was deemed most appropriate.  Using a sterile surgical marker, an appropriate advancement flap was drawn incorporating the defect and placing the expected incisions within the relaxed skin tension lines where possible.    The area thus outlined was incised deep to adipose tissue with a #15 scalpel blade.  The skin margins were undermined to an appropriate distance in all directions utilizing iris scissors.

## 2022-04-20 NOTE — ED PROVIDER NOTE - PATIENT PORTAL LINK FT
You can access the FollowMyHealth Patient Portal offered by [facility] by registering at the following website: [email or web address]. By joining [employer]’s FollowMyHealth portal, you will also be able to view your health information using other applications (apps) compatible with our system. 98

## 2022-05-02 ENCOUNTER — NON-APPOINTMENT (OUTPATIENT)
Age: 47
End: 2022-05-02

## 2022-05-11 ENCOUNTER — NON-APPOINTMENT (OUTPATIENT)
Age: 47
End: 2022-05-11

## 2022-06-03 NOTE — PATIENT PROFILE ADULT - NSPROMEDSHERBAL_GEN_A_NUR
----- Message from Tegan Duarte NP sent at 6/3/2022  1:55 PM CDT -----  Chlamydia and gonorrhoeae screens both negative. yes

## 2022-06-23 NOTE — ED PROVIDER NOTE - IV ALTEPASE ADMIN HIDDEN
Pharmacist chart review completed for refill of Humira indicates no medication or significant health changes since last pharmacist counseling. No questions for the pharmacist. Communicated with patient over phone. Patient's prescription was billed through Medicare Part D. Medication shipped on 6/28/22 via Fed[employer] to [address] [place] [id number] for delivery in 1-2 business days.     Dora Marc   [facility]  Phone: [phone]  [email or web address]          show

## 2022-08-04 ENCOUNTER — NON-APPOINTMENT (OUTPATIENT)
Age: 47
End: 2022-08-04
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
## 2022-08-25 ENCOUNTER — NON-APPOINTMENT (OUTPATIENT)
Age: 47
End: 2022-08-25

## 2022-09-12 NOTE — DISCHARGE NOTE PROVIDER - NSDCCAREPROVSEEN_GEN_ALL_CORE_FT
Preparing for Your Surgery      Name:  Gregg Maharaj   MRN:  [id number]   :  1999   Today's Date:  2022       Arriving for surgery:  Surgery date:  22  Arrival time:  5:30 am       Visiting hours: 8 a.m. to 8:30 p.m.     Hospital: Adult patients and children (under age 18 can have 4 visitor at a time     No visitors under the age of 5 are allowed for hospital patients.     Surgeries and procedures: Adult patients can have 2 visitors all through the surgery process.     Patients with disabilities: Can have a support person with them (family member, service provider     Or someone well informed about their needs) plus the allowed number of visitors     Patients confirmed or suspected to have symptoms of COVID 19 or flu:     No visitors allowed for adult patients.   Children (under age 18) can have 1 named visitor.     People who are sick or showing symptoms of COVID 19 or flu:    Are not allowed to visit patients--we can only make exceptions in special situations.       Please follow these guidelines for your visit:   Arrive wearing a mask over your mouth and nose; we will give you a medical mask to wear    If you arrive wearing a cloth mask.   Keep it on during your entire visit, even when in patient's room.   If you don't wear a mask we'll ask you to leave.     Clean your hands with alcohol hand . Do this when you arrive at and leave the building and patient room,    And again after you touch your mask or anything in the room.     You can t visit if you have a fever, cough, shortness of breath, muscle aches, headaches, sore throat    Or diarrhea      Stay 6 feet away from others during your visit and between visits     Go directly to and from the room you are visiting.     Stay in the patient s room during your visit. Limit going to other places in the hospital as much as possible     Leave bags and jackets at home or in the car.     For everyone s health, please don t come and go during  your visit. That includes for smoking   during your visit. That includes for smoking    Please come to:     [facility] [address]  [address] [place]  [address]    - parking is available in front of [facility] from 5:15AM to 8:00PM. If you prefer, park your car in the Green Lot.    -Proceed to the 3rd floor, check in at the Adult Surgery Waiting Lounge. [phone]    If an escort is needed stop at the Information Desk in the lobby. Inform the information person that you are here for surgery. An escort to the Adult Surgery Waiting Lounge will be provided.    What can I eat or drink?  -  You may eat and drink normally for up to 8 hours before your surgery. (Until 11:30 pm)  -  You may have clear liquids until 2 hours before surgery. (Until 5:30 am)    Examples of clear liquids:  Water  Clear broth  Juices (apple, white grape, white cranberry  and cider) without pulp  Noncarbonated, powder based beverages  (lemonade and Ari-Aid)  Sodas (Sprite, 7-Up, ginger ale and seltzer)  Coffee or tea (without milk or cream)  Gatorade    -  No Alcohol for at least 24 hours before surgery.     Which medicines can I take?    Hold Aspirin for 7 days before surgery.   Hold Multivitamins for 7 days before surgery.  Hold Supplements for 7 days before surgery.  Hold Ibuprofen (Advil, Motrin) for 1 day before surgery--unless otherwise directed by surgeon.  Hold Naproxen (Aleve) for 4 days before surgery.    Hold all NSAIDS for 7 days before spine surgery. (Ibuprofen, Naproxen, Celebrex, Indocin, Diclofenac.)    -  DO NOT take these medications the day of surgery:  Cetirizine (Zyrtec)  Ibuprofen - see above  Imitrex  Methylphenidate (Ritalin)    -  PLEASE TAKE these medications the day of surgery:  Albuterol (Proair) inhaler if needed and bring this to the hospital with you  Azelastine (Astelin)  Baclofen (Lioresal) may be taken at your usual scheduled  time  Concerta  Famotidine (Pepcid)  Gabapentin (Neurontin)  Loratadine (Claritin)  Montelukast (Singulair)  Oxybutynin (Ditropan)  Propranolol (Inderal) may be taken at your usual scheduled time  Testosterone gel  Venlafaxine (Effexor)    How do I prepare myself?  - Please take 2 showers before surgery using Scrubcare or Hibiclens soap.    Use this soap only from the neck to your toes.     Leave the soap on your skin for one minute--then rinse thoroughly.      You may use your own shampoo and conditioner. No other hair products.   - Please remove all jewelry and body piercings.  - No lotions, deodorants or fragrance.  - No makeup or fingernail polish.   - Bring your ID and insurance card.    -If you have a Deep Brain Stimulator, Spinal Cord Stimulator, or any Neuro Stimulator device---you must bring the remote control to the hospital.      ALL PATIENTS GOING HOME THE SAME DAY OF SURGERY ARE REQUIRED TO HAVE A RESPONSIBLE ADULT TO DRIVE AND BE IN ATTENDANCE WITH THEM FOR 24 HOURS FOLLOWING SURGERY.      Questions or Concerns:    - For any questions regarding the day of surgery or your hospital stay, please contact the Pre Admission Nursing Office at [phone].       - If you have health changes between today and your surgery, please call your surgeon.       - For questions after surgery, please call your surgeons office.              Tristen Diaz Angel

## 2022-09-19 NOTE — ED PROVIDER NOTE - CHPI ED SYMPTOMS NEG
Get fasting labs drawn soon. Continue current medicines. Continue healthy lifestyle changes (diet/exercise/attempt weight loss). Return in about 6 months (around 3/19/2023) for Fasting recheck DM, BP, lipids (sooner if labs are abnormal). no chills/no vomiting/no pain/no fever/no nausea

## 2023-01-13 ENCOUNTER — APPOINTMENT (OUTPATIENT)
Dept: INTERNAL MEDICINE | Facility: CLINIC | Age: 48
End: 2023-01-13
Payer: MEDICAID

## 2023-01-13 VITALS
HEIGHT: 66 IN | SYSTOLIC BLOOD PRESSURE: 131 MMHG | WEIGHT: 156 LBS | DIASTOLIC BLOOD PRESSURE: 85 MMHG | HEART RATE: 108 BPM | BODY MASS INDEX: 25.07 KG/M2 | TEMPERATURE: 97.1 F | OXYGEN SATURATION: 98 %

## 2023-01-13 DIAGNOSIS — R00.0 TACHYCARDIA, UNSPECIFIED: ICD-10-CM

## 2023-01-13 DIAGNOSIS — Z00.00 ENCOUNTER FOR GENERAL ADULT MEDICAL EXAMINATION W/OUT ABNORMAL FINDINGS: ICD-10-CM

## 2023-01-13 PROCEDURE — 99396 PREV VISIT EST AGE 40-64: CPT | Mod: 25

## 2023-01-19 LAB
ALBUMIN SERPL ELPH-MCNC: 4.7 G/DL
ALP BLD-CCNC: 62 U/L
ALT SERPL-CCNC: 22 U/L
ANION GAP SERPL CALC-SCNC: 14 MMOL/L
AST SERPL-CCNC: 22 U/L
BASOPHILS # BLD AUTO: 0.08 K/UL
BASOPHILS NFR BLD AUTO: 1.1 %
BILIRUB SERPL-MCNC: 0.2 MG/DL
BUN SERPL-MCNC: 10 MG/DL
CALCIUM SERPL-MCNC: 10.8 MG/DL
CHLORIDE SERPL-SCNC: 102 MMOL/L
CHOLEST SERPL-MCNC: 197 MG/DL
CO2 SERPL-SCNC: 24 MMOL/L
CREAT SERPL-MCNC: 0.69 MG/DL
EGFR: 106 ML/MIN/1.73M2
EOSINOPHIL # BLD AUTO: 0.18 K/UL
EOSINOPHIL NFR BLD AUTO: 2.4 %
GLUCOSE SERPL-MCNC: 83 MG/DL
HCT VFR BLD CALC: 45.6 %
HDLC SERPL-MCNC: 87 MG/DL
HGB BLD-MCNC: 15 G/DL
IMM GRANULOCYTES NFR BLD AUTO: 0.5 %
LDLC SERPL CALC-MCNC: 83 MG/DL
LYMPHOCYTES # BLD AUTO: 2.04 K/UL
LYMPHOCYTES NFR BLD AUTO: 27.6 %
MAN DIFF?: NORMAL
MCHC RBC-ENTMCNC: 31.8 PG
MCHC RBC-ENTMCNC: 32.9 GM/DL
MCV RBC AUTO: 96.6 FL
MONOCYTES # BLD AUTO: 0.78 K/UL
MONOCYTES NFR BLD AUTO: 10.6 %
NEUTROPHILS # BLD AUTO: 4.27 K/UL
NEUTROPHILS NFR BLD AUTO: 57.8 %
NONHDLC SERPL-MCNC: 110 MG/DL
PLATELET # BLD AUTO: 369 K/UL
POTASSIUM SERPL-SCNC: 5.5 MMOL/L
PROT SERPL-MCNC: 7.4 G/DL
RBC # BLD: 4.72 M/UL
RBC # FLD: 12.1 %
SODIUM SERPL-SCNC: 141 MMOL/L
TRIGL SERPL-MCNC: 134 MG/DL
TSH SERPL-ACNC: 1.89 UIU/ML
WBC # FLD AUTO: 7.39 K/UL

## 2023-02-07 NOTE — ED PROVIDER NOTE - PSH
S/P wrist surgery Rinvoq Counseling: I discussed with the patient the risks of Rinvoq therapy including but not limited to upper respiratory tract infections, shingles, cold sores, bronchitis, nausea, cough, fever, acne, and headache. Live vaccines should be avoided.  This medication has been linked to serious infections; higher rate of mortality; malignancy and lymphoproliferative disorders; major adverse cardiovascular events; thrombosis; thrombocytopenia, anemia, and neutropenia; lipid elevations; liver enzyme elevations; and gastrointestinal perforations.

## 2023-02-26 ENCOUNTER — RX RENEWAL (OUTPATIENT)
Age: 48
End: 2023-02-26

## 2023-04-03 NOTE — ED PROVIDER NOTE - OBJECTIVE STATEMENT
42 y/o F with PMHx of EtOH abuse (last admission for withdrawal Aug 11-14, went to outpatient rehab, given naltrexone and possible Librium) HTN, LEEP done September 20th by Dr. Leonard at [facility] with intermittent vaginal bleeding since then. Patient states he last drink was a third of a bottle of rum earlier today and had 6 beers last night until 1AM. Denies abdominal pain, SOB, chest pain, nausea, vomiting, diarrhea. Patient complaining of palpitations since a few hours ago. No PMHx of Cad, MI, DM, smoking, DVT, PE, denies IV drug use. None

## 2023-04-28 NOTE — PROGRESS NOTE ADULT - ASSESSMENT
Patient is a 45 year old female with a pmhx of alcohol use disorder with multiple admission for alcohol withdrawal, admitted for treatment of alcohol withdrawal and alcoholic ketoacidosis.
no

## 2023-05-18 DIAGNOSIS — R92.8 OTHER ABNORMAL AND INCONCLUSIVE FINDINGS ON DIAGNOSTIC IMAGING OF BREAST: ICD-10-CM

## 2023-06-08 ENCOUNTER — APPOINTMENT (OUTPATIENT)
Dept: MAMMOGRAPHY | Facility: HOSPITAL | Age: 48
End: 2023-06-08

## 2023-06-08 ENCOUNTER — APPOINTMENT (OUTPATIENT)
Dept: ULTRASOUND IMAGING | Facility: HOSPITAL | Age: 48
End: 2023-06-08

## 2023-06-11 ENCOUNTER — EMERGENCY (EMERGENCY)
Facility: HOSPITAL | Age: 48
LOS: 1 days | Discharge: HOME | End: 2023-06-11
Attending: EMERGENCY MEDICINE | Admitting: EMERGENCY MEDICINE
Payer: COMMERCIAL

## 2023-06-11 VITALS
HEART RATE: 125 BPM | RESPIRATION RATE: 17 BRPM | WEIGHT: 154.98 LBS | TEMPERATURE: 98 F | SYSTOLIC BLOOD PRESSURE: 157 MMHG | HEIGHT: 66 IN | DIASTOLIC BLOOD PRESSURE: 95 MMHG | OXYGEN SATURATION: 97 %

## 2023-06-11 VITALS
RESPIRATION RATE: 16 BRPM | SYSTOLIC BLOOD PRESSURE: 117 MMHG | TEMPERATURE: 98 F | OXYGEN SATURATION: 96 % | DIASTOLIC BLOOD PRESSURE: 71 MMHG | HEART RATE: 90 BPM

## 2023-06-11 DIAGNOSIS — R00.2 PALPITATIONS: ICD-10-CM

## 2023-06-11 DIAGNOSIS — R00.0 TACHYCARDIA, UNSPECIFIED: ICD-10-CM

## 2023-06-11 DIAGNOSIS — I10 ESSENTIAL (PRIMARY) HYPERTENSION: ICD-10-CM

## 2023-06-11 DIAGNOSIS — Z98.890 OTHER SPECIFIED POSTPROCEDURAL STATES: Chronic | ICD-10-CM

## 2023-06-11 DIAGNOSIS — F10.129 ALCOHOL ABUSE WITH INTOXICATION, UNSPECIFIED: ICD-10-CM

## 2023-06-11 DIAGNOSIS — Z88.8 ALLERGY STATUS TO OTHER DRUGS, MEDICAMENTS AND BIOLOGICAL SUBSTANCES: ICD-10-CM

## 2023-06-11 LAB
ALBUMIN SERPL ELPH-MCNC: 4.5 G/DL — SIGNIFICANT CHANGE UP (ref 3.3–5)
ALP SERPL-CCNC: 58 U/L — SIGNIFICANT CHANGE UP (ref 40–120)
ALT FLD-CCNC: 28 U/L — SIGNIFICANT CHANGE UP (ref 10–45)
ANION GAP SERPL CALC-SCNC: 14 MMOL/L — SIGNIFICANT CHANGE UP (ref 5–17)
AST SERPL-CCNC: 47 U/L — HIGH (ref 10–40)
BASOPHILS # BLD AUTO: 0.07 K/UL — SIGNIFICANT CHANGE UP (ref 0–0.2)
BASOPHILS NFR BLD AUTO: 1 % — SIGNIFICANT CHANGE UP (ref 0–2)
BILIRUB SERPL-MCNC: 0.5 MG/DL — SIGNIFICANT CHANGE UP (ref 0.2–1.2)
BUN SERPL-MCNC: 14 MG/DL — SIGNIFICANT CHANGE UP (ref 7–23)
CALCIUM SERPL-MCNC: 8.6 MG/DL — SIGNIFICANT CHANGE UP (ref 8.4–10.5)
CHLORIDE SERPL-SCNC: 104 MMOL/L — SIGNIFICANT CHANGE UP (ref 96–108)
CO2 SERPL-SCNC: 25 MMOL/L — SIGNIFICANT CHANGE UP (ref 22–31)
CREAT SERPL-MCNC: 0.66 MG/DL — SIGNIFICANT CHANGE UP (ref 0.5–1.3)
EGFR: 107 ML/MIN/1.73M2 — SIGNIFICANT CHANGE UP
EOSINOPHIL # BLD AUTO: 0 K/UL — SIGNIFICANT CHANGE UP (ref 0–0.5)
EOSINOPHIL NFR BLD AUTO: 0 % — SIGNIFICANT CHANGE UP (ref 0–6)
GLUCOSE SERPL-MCNC: 128 MG/DL — HIGH (ref 70–99)
HCT VFR BLD CALC: 40.6 % — SIGNIFICANT CHANGE UP (ref 34.5–45)
HGB BLD-MCNC: 14.5 G/DL — SIGNIFICANT CHANGE UP (ref 11.5–15.5)
IMM GRANULOCYTES NFR BLD AUTO: 0.1 % — SIGNIFICANT CHANGE UP (ref 0–0.9)
LYMPHOCYTES # BLD AUTO: 2.49 K/UL — SIGNIFICANT CHANGE UP (ref 1–3.3)
LYMPHOCYTES # BLD AUTO: 36.5 % — SIGNIFICANT CHANGE UP (ref 13–44)
MAGNESIUM SERPL-MCNC: 2 MG/DL — SIGNIFICANT CHANGE UP (ref 1.6–2.6)
MCHC RBC-ENTMCNC: 33.6 PG — SIGNIFICANT CHANGE UP (ref 27–34)
MCHC RBC-ENTMCNC: 35.7 GM/DL — SIGNIFICANT CHANGE UP (ref 32–36)
MCV RBC AUTO: 94.2 FL — SIGNIFICANT CHANGE UP (ref 80–100)
MONOCYTES # BLD AUTO: 0.92 K/UL — HIGH (ref 0–0.9)
MONOCYTES NFR BLD AUTO: 13.5 % — SIGNIFICANT CHANGE UP (ref 2–14)
NEUTROPHILS # BLD AUTO: 3.34 K/UL — SIGNIFICANT CHANGE UP (ref 1.8–7.4)
NEUTROPHILS NFR BLD AUTO: 48.9 % — SIGNIFICANT CHANGE UP (ref 43–77)
NRBC # BLD: 0 /100 WBCS — SIGNIFICANT CHANGE UP (ref 0–0)
PLATELET # BLD AUTO: 257 K/UL — SIGNIFICANT CHANGE UP (ref 150–400)
POTASSIUM SERPL-MCNC: 3.6 MMOL/L — SIGNIFICANT CHANGE UP (ref 3.5–5.3)
POTASSIUM SERPL-SCNC: 3.6 MMOL/L — SIGNIFICANT CHANGE UP (ref 3.5–5.3)
PROT SERPL-MCNC: 7.3 G/DL — SIGNIFICANT CHANGE UP (ref 6–8.3)
RBC # BLD: 4.31 M/UL — SIGNIFICANT CHANGE UP (ref 3.8–5.2)
RBC # FLD: 12.3 % — SIGNIFICANT CHANGE UP (ref 10.3–14.5)
SODIUM SERPL-SCNC: 143 MMOL/L — SIGNIFICANT CHANGE UP (ref 135–145)
TROPONIN T, HIGH SENSITIVITY RESULT: <6 NG/L — SIGNIFICANT CHANGE UP (ref 0–51)
WBC # BLD: 6.83 K/UL — SIGNIFICANT CHANGE UP (ref 3.8–10.5)
WBC # FLD AUTO: 6.83 K/UL — SIGNIFICANT CHANGE UP (ref 3.8–10.5)

## 2023-06-11 PROCEDURE — 99285 EMERGENCY DEPT VISIT HI MDM: CPT

## 2023-06-11 PROCEDURE — 71045 X-RAY EXAM CHEST 1 VIEW: CPT

## 2023-06-11 PROCEDURE — 80053 COMPREHEN METABOLIC PANEL: CPT

## 2023-06-11 PROCEDURE — 71045 X-RAY EXAM CHEST 1 VIEW: CPT | Mod: 26

## 2023-06-11 PROCEDURE — 85025 COMPLETE CBC W/AUTO DIFF WBC: CPT

## 2023-06-11 PROCEDURE — 93005 ELECTROCARDIOGRAM TRACING: CPT

## 2023-06-11 PROCEDURE — 36415 COLL VENOUS BLD VENIPUNCTURE: CPT

## 2023-06-11 PROCEDURE — 99285 EMERGENCY DEPT VISIT HI MDM: CPT | Mod: 25

## 2023-06-11 PROCEDURE — 84484 ASSAY OF TROPONIN QUANT: CPT

## 2023-06-11 PROCEDURE — 96374 THER/PROPH/DIAG INJ IV PUSH: CPT

## 2023-06-11 PROCEDURE — 83735 ASSAY OF MAGNESIUM: CPT

## 2023-06-11 RX ORDER — SODIUM CHLORIDE 9 MG/ML
1000 INJECTION INTRAMUSCULAR; INTRAVENOUS; SUBCUTANEOUS ONCE
Refills: 0 | Status: COMPLETED | OUTPATIENT
Start: 2023-06-11 | End: 2023-06-11

## 2023-06-11 RX ADMIN — Medication 1 MILLIGRAM(S): at 04:26

## 2023-06-11 RX ADMIN — SODIUM CHLORIDE 1000 MILLILITER(S): 9 INJECTION INTRAMUSCULAR; INTRAVENOUS; SUBCUTANEOUS at 04:26

## 2023-06-11 NOTE — ED ADULT NURSE NOTE - OBJECTIVE STATEMENT
47 y.o. female, presents with c/o palpitations that started yesterday. Patient denies any numbness, tingling, SOB, CP. Patient reports she has been under a lot of stress because she is moving to [place]. Patient denies any PMH. Patient placed on cardiac monitor, breathing spontaneously, even and unlabored. Patient is AAOx4, ambulatory, GCS of 15.

## 2023-06-11 NOTE — ED PROVIDER NOTE - CLINICAL SUMMARY MEDICAL DECISION MAKING FREE TEXT BOX
palpitations, no chest pain, +etoh intox, no tremors currently, no evidence of withdrawal at this time  -check labs  -ekg  -ivf  -ativan  -CXR

## 2023-06-11 NOTE — ED PROVIDER NOTE - OBJECTIVE STATEMENT
47F hx etoh abuse, c/o palpitations. pt states she has been drinking daily as she has been feeling stressed and drinks to deal with it. states she gets palpitations when she's stressed. no chest pain. no vomiting. pt states she does get shaky when she stops drinking. denies seizures.

## 2023-06-11 NOTE — ED ADULT NURSE NOTE - NSFALLUNIVINTERV_ED_ALL_ED
Bed/Stretcher in lowest position, wheels locked, appropriate side rails in place/Call bell, personal items and telephone in reach/Instruct patient to call for assistance before getting out of bed/chair/stretcher/Non-slip footwear applied when patient is off stretcher/[place] to call system/Physically safe environment - no spills, clutter or unnecessary equipment/Purposeful proactive rounding/Room/bathroom lighting operational, light cord in reach

## 2023-06-11 NOTE — ED PROVIDER NOTE - PROGRESS NOTE DETAILS
pt wakes to touch, however falls quickly back to sleep, will continue to monitor for sobriety mahi: pt received at sign out as pending sobriety then to be dc'd, pt ambulated w/steady gait, no complaints, stable for dc

## 2023-06-11 NOTE — ED ADULT NURSE REASSESSMENT NOTE - NS ED NURSE REASSESS COMMENT FT1
Patient received 2L of IVF and 1mg of Ativan. Patient reports she is no longer feeling palpitations at this time. Breathing spontaneously, visible chest rise, even and unlabored, VSS, NAD.

## 2023-06-11 NOTE — ED PROVIDER NOTE - PATIENT PORTAL LINK FT
You can access the FollowMyHealth Patient Portal offered by [facility] by registering at the following website: [email or web address]. By joining [employer]’s FollowMyHealth portal, you will also be able to view your health information using other applications (apps) compatible with our system.

## 2023-06-15 NOTE — PATIENT PROFILE ADULT - DISASTER - NSPRESCRALCAMT_GEN_A_NUR
independent  HPI:  6/15/23, Time: 1:23 AM EDT         Araceli Boston is a 43 y.o. female presenting to the ED for concerns for foreign body. Patient presents emergency department states that she was eating potato chips and feels like she has a piece stuck in her throat. Patient is able to speak clearly and coherently, no drooling. Patient reports that she has been able to drink afterwards but still feels like there is something there. Patient denies any shortness of breath or any chest pain. Review of Systems:   A complete review of systems was performed and pertinent positives and negatives are stated within HPI, all other systems reviewed and are negative.          --------------------------------------------- PAST HISTORY ---------------------------------------------  Past Medical History:  has a past medical history of Diarrhea and RUQ discomfort. Past Surgical History:  has a past surgical history that includes LEEP (2007); Upper gastrointestinal endoscopy (N/A, 5/21/2019); esophageal motility study (N/A, 6/17/2019); and hiatal hernia repair (N/A, 8/1/2019). Social History:  reports that she has been smoking cigarettes. She has been smoking an average of .25 packs per day. She has never used smokeless tobacco. She reports current alcohol use. She reports that she does not currently use drugs after having used the following drugs: Marijuana Anh Last), Cocaine, and Opiates . Family History: family history is not on file. The patients home medications have been reviewed. Allergies: Patient has no known allergies. -------------------------------------------------- RESULTS -------------------------------------------------  All laboratory and radiology results have been personally reviewed by myself   LABS:  No results found for this visit on 06/14/23. RADIOLOGY:  Interpreted by Radiologist.  XR NECK SOFT TISSUE   Final Result   No radiopaque foreign body.          XR CHEST PORTABLE 7 to 9

## 2023-08-01 ENCOUNTER — RX RENEWAL (OUTPATIENT)
Age: 48
End: 2023-08-01

## 2023-09-18 ENCOUNTER — RX RENEWAL (OUTPATIENT)
Age: 48
End: 2023-09-18

## 2023-09-18 RX ORDER — DULOXETINE HYDROCHLORIDE 20 MG/1
20 CAPSULE, DELAYED RELEASE PELLETS ORAL TWICE DAILY
Qty: 60 | Refills: 0 | Status: ACTIVE | COMMUNITY
Start: 2023-02-26 | End: 1900-01-01

## 2023-10-17 NOTE — SBIRT NOTE ADULT - NSSBIRTAUDITSCORE_GEN_A_CORE_CAL
Preparing to see the patient including review of tests and other providers' notes, confirming history with patient/family member, performing medical examination and evaluation, counseling and educating the patient/family/caregiver, ordering medications, tests and procedures, communicating with other health care professionals, documenting clinical information in the EMR, independently interpreting results and communicating results to the patient/family/caregiver, care coordination.
26

## 2023-12-13 NOTE — PATIENT PROFILE ADULT - NSPROMEDSPUMP_GEN_A_NUR
MD Arjun   atenolol (TENORMIN) 50 MG tablet Take 1 tablet by mouth 2 times daily 9/6/23  Yes Avelino Benito MD   losartan (COZAAR) 25 MG tablet Take 1 tablet by mouth 2 times daily 9/6/23  Yes Avelino Benito MD   GENERIC EXTERNAL MEDICATION daily Patient reports taking [place] daily   Yes Arjun Fernandez MD   Ascorbic Acid (VITAMIN C) 100 MG tablet Take 1 tablet by mouth daily   Yes Arjun Fernandez MD   Lutein 40 MG CAPS Take  by mouth. #1tab bid   Yes ProviderArjun MD       Past Surgical History:   Procedure Laterality Date    BREAST BIOPSY Left     CARPAL TUNNEL RELEASE      COLONOSCOPY  09/08/09    -repeat 5 years    EYE SURGERY  2012    Dr Guru Rajput ([address])      SINUS SURGERY      x2    TOE SURGERY         Family History   Problem Relation Age of Onset    Cancer Brother         colon       Social History     Tobacco Use    Smoking status: Never    Smokeless tobacco: Never   Substance Use Topics    Alcohol use: No       ROS: All 14 ROS systems reviewed and pertinent positives noted above, all others negative. Lower Extremity Physical Examination:     Vitals: There were no vitals filed for this visit. General: AAO x 3 in NAD. Vascular: DP and PT pulses palpable 2/4, bilateral.  CFT <3 seconds, bilateral.  Hair growth absent to the level of the digits, bilateral.  Edema present, bilateral.  Varicosities present, bilateral. Erythema absent, bilateral. Distal Rubor absent bilateral.  Temperature decreased bilateral. Hyperpigmentation present bilateral. Atrophic skin yes. Neurological: Sensation intact to light touch to level of digits, bilateral.  Protective sensation intact 10/10 sites via 5.07/10g [place] Monofilament, bilateral.  negative Tinel's, bilateral.  negative Valleix sign, bilateral.  Vibratory intact bilateral.  Reflexes Decreased bilateral.  Paresthesias negative. Dysthesias negative.   Sharp/dull
none

## 2024-01-18 NOTE — ED ADULT TRIAGE NOTE - AS O2 DELIVERY
PORTILLO THICK AND RED. DR. DAVID HERE. PORTILLO IRRIGATED PER HIS REQUEST. LIGHT RED RETURN WITH 3 SMALLER CLOTS. CLEAR PINK RETURN IN THE PORTILLO AFTER IRRIGATION   room air

## 2024-01-30 NOTE — ED PROVIDER NOTE - MDM ORDERS SUBMITTED SELECTION
Received notice from PA team that Humira was approved for re authorization.    Sent patient a message that if she is going to take Humira she needs to do the initial labs.  Re entered orders as previous orders .  
Labs/EKG

## 2024-06-13 NOTE — ED ADULT NURSE NOTE - NSFALLRSKASSESSTYPE_ED_ALL_ED
Addended by: GINA WARNER on: 6/13/2024 04:13 PM     Modules accepted: Orders     Initial (On Arrival)

## 2024-06-20 NOTE — ED PROVIDER NOTE - ATTESTATION, MLM
[FreeTextEntry1] : Clinical breast exam performed and self breast exam explained along with indications for intermittent screening.  She was written for mammogram/ultrasound via her primary care physician.  Cervical Pap smear performed and importance of yearly woman appointments and cervical Pap smears reviewed.  She has a history of vaginal atrophy and was on estrogen supplementation/cream prior.  This Rx was renewed at her request.  Patient was counseled on importance of going forward with colonoscopy.  She will follow-up with her PCP, as directed.  She may return to office in 1 years time, or as needed.  All questions addressed. I have reviewed and confirmed nurses' notes for patient's medications, allergies, medical history, and surgical history.

## 2025-04-04 NOTE — ED PROVIDER NOTE - NS ED MD DISPO DISCHARGE CCDA
Spiritual Care Visit  Spiritual Care Request    Reason for Visit:  Routine Visit: Introduction  Continue Visiting: Yes     Request Received From:       Focus of Care:  Visited With: Patient         Refer to :          Spiritual Care Assessment    Spiritual Assessment:                      Care Provided:  Intended Effects: Aligning care plan with patient's values, Build relationship of care and support, Convey a calming presence, Demonstrate caring and concern, Lessen someone's feelings of isolation, Lessen anxiety, Helping someone feel comforted, Marlen affirmation, Establish rapport and connectedness, Meaning-making, Preserve dignity and respect, Promote sense of peace    Sense of Community and or Druze Affiliation:  Natasha         Addressed Needs/Concerns and/or Sina Through:          Outcome:        Advance Directives:         Spiritual Care Annotation    Annotation:  Happened to walk into patient's room, he said our staff had misplaced his walker.  He was kind of upset about that.       
5
Patient/Caregiver provided printed discharge information.

## 2025-07-24 NOTE — ED ADULT NURSE NOTE - NSFALLRSKASSESSTYPE_ED_ALL_ED
[No] : not Initial (On Arrival) [Pacemaker] : pacemaker [DDD] : DDD [Normal] : The battery status is normal. [de-identified] : [employer] [de-identified] : 2/23/17 [de-identified] :  [de-identified] : 3.0-3.6 years [de-identified] : AP 67%  <1% AT/AF episodes <1 minute each. No sustained events  See scanned report for further interrogation details 7/21/25